# Patient Record
Sex: FEMALE | Race: WHITE | Employment: OTHER | URBAN - NONMETROPOLITAN AREA
[De-identification: names, ages, dates, MRNs, and addresses within clinical notes are randomized per-mention and may not be internally consistent; named-entity substitution may affect disease eponyms.]

---

## 2018-11-07 ENCOUNTER — OFFICE VISIT (OUTPATIENT)
Dept: SURGERY | Age: 63
End: 2018-11-07
Payer: COMMERCIAL

## 2018-11-07 VITALS
SYSTOLIC BLOOD PRESSURE: 138 MMHG | BODY MASS INDEX: 27.38 KG/M2 | DIASTOLIC BLOOD PRESSURE: 70 MMHG | HEART RATE: 72 BPM | WEIGHT: 145 LBS | HEIGHT: 61 IN

## 2018-11-07 DIAGNOSIS — N63.20 LEFT BREAST MASS: Primary | ICD-10-CM

## 2018-11-07 DIAGNOSIS — R92.8 ABNORMAL MAMMOGRAM: ICD-10-CM

## 2018-11-07 DIAGNOSIS — N60.19 FIBROCYSTIC BREAST DISEASE (FCBD), UNSPECIFIED LATERALITY: ICD-10-CM

## 2018-11-07 DIAGNOSIS — N63.0 LUMP OR MASS IN BREAST: ICD-10-CM

## 2018-11-07 PROCEDURE — 99204 OFFICE O/P NEW MOD 45 MIN: CPT | Performed by: SURGERY

## 2018-12-06 PROBLEM — R92.8 ABNORMAL MAMMOGRAM: Status: ACTIVE | Noted: 2018-12-06

## 2018-12-06 PROBLEM — N63.0 LUMP OR MASS IN BREAST: Status: ACTIVE | Noted: 2018-12-06

## 2018-12-06 PROBLEM — N60.19 DIFFUSE CYSTIC MASTOPATHY: Status: ACTIVE | Noted: 2018-12-06

## 2019-02-13 ENCOUNTER — OFFICE VISIT (OUTPATIENT)
Dept: SURGERY | Age: 64
End: 2019-02-13
Payer: COMMERCIAL

## 2019-02-13 ENCOUNTER — HOSPITAL ENCOUNTER (OUTPATIENT)
Dept: WOMENS IMAGING | Age: 64
Discharge: HOME | End: 2019-02-13
Payer: COMMERCIAL

## 2019-02-13 DIAGNOSIS — N63.20 LEFT BREAST MASS: ICD-10-CM

## 2019-02-13 DIAGNOSIS — N60.19 FIBROCYSTIC BREAST DISEASE (FCBD), UNSPECIFIED LATERALITY: ICD-10-CM

## 2019-02-13 DIAGNOSIS — R92.8 ABNORMAL MAMMOGRAM: ICD-10-CM

## 2019-02-13 DIAGNOSIS — N63.0 LUMP OR MASS IN BREAST: Primary | ICD-10-CM

## 2019-02-13 PROCEDURE — 76642 ULTRASOUND BREAST LIMITED: CPT

## 2019-02-13 PROCEDURE — 99213 OFFICE O/P EST LOW 20 MIN: CPT | Performed by: SURGERY

## 2019-03-29 ENCOUNTER — TELEPHONE (OUTPATIENT)
Dept: SURGERY | Age: 64
End: 2019-03-29

## 2021-08-25 ENCOUNTER — OFFICE VISIT (OUTPATIENT)
Dept: OBGYN CLINIC | Age: 66
End: 2021-08-25
Payer: COMMERCIAL

## 2021-08-25 VITALS
BODY MASS INDEX: 26.45 KG/M2 | WEIGHT: 140 LBS | SYSTOLIC BLOOD PRESSURE: 140 MMHG | DIASTOLIC BLOOD PRESSURE: 60 MMHG | HEART RATE: 81 BPM

## 2021-08-25 DIAGNOSIS — Z12.4 SCREENING FOR CERVICAL CANCER: ICD-10-CM

## 2021-08-25 DIAGNOSIS — Z01.419 WOMEN'S ANNUAL ROUTINE GYNECOLOGICAL EXAMINATION: Primary | ICD-10-CM

## 2021-08-25 DIAGNOSIS — Z76.89 ENCOUNTER TO ESTABLISH CARE WITH NEW DOCTOR: ICD-10-CM

## 2021-08-25 DIAGNOSIS — N95.2 VAGINAL ATROPHY: ICD-10-CM

## 2021-08-25 DIAGNOSIS — Z12.31 ENCOUNTER FOR SCREENING MAMMOGRAM FOR MALIGNANT NEOPLASM OF BREAST: ICD-10-CM

## 2021-08-25 PROCEDURE — 99386 PREV VISIT NEW AGE 40-64: CPT | Performed by: NURSE PRACTITIONER

## 2021-08-25 RX ORDER — ESTRADIOL 0.1 MG/G
1 CREAM VAGINAL SEE ADMIN INSTRUCTIONS
Qty: 1 TUBE | Refills: 3 | Status: SHIPPED | OUTPATIENT
Start: 2021-08-25

## 2021-08-25 NOTE — PATIENT INSTRUCTIONS
Patient Education        A Healthy Lifestyle: Care Instructions  Your Care Instructions     A healthy lifestyle can help you feel good, stay at a healthy weight, and have plenty of energy for both work and play. A healthy lifestyle is something you can share with your whole family. A healthy lifestyle also can lower your risk for serious health problems, such as high blood pressure, heart disease, and diabetes. You can follow a few steps listed below to improve your health and the health of your family. Follow-up care is a key part of your treatment and safety. Be sure to make and go to all appointments, and call your doctor if you are having problems. It's also a good idea to know your test results and keep a list of the medicines you take. How can you care for yourself at home? · Do not eat too much sugar, fat, or fast foods. You can still have dessert and treats now and then. The goal is moderation. · Start small to improve your eating habits. Pay attention to portion sizes, drink less juice and soda pop, and eat more fruits and vegetables. ? Eat a healthy amount of food. A 3-ounce serving of meat, for example, is about the size of a deck of cards. Fill the rest of your plate with vegetables and whole grains. ? Limit the amount of soda and sports drinks you have every day. Drink more water when you are thirsty. ? Eat plenty of fruits and vegetables every day. Have an apple or some carrot sticks as an afternoon snack instead of a candy bar. Try to have fruits and/or vegetables at every meal.  · Make exercise part of your daily routine. You may want to start with simple activities, such as walking, bicycling, or slow swimming. Try to be active 30 to 60 minutes every day. You do not need to do all 30 to 60 minutes all at once. For example, you can exercise 3 times a day for 10 or 20 minutes.  Moderate exercise is safe for most people, but it is always a good idea to talk to your doctor before starting an 2020               Content Version: 12.9  © 2006-2021 Healthwise, Incorporated. Care instructions adapted under license by [facility] ([facility]). If you have questions about a medical condition or this instruction, always ask your healthcare professional. [address] any warranty or liability for your use of this information. Patient Education        Breast Self-Exam: Care Instructions  Your Care Instructions     A breast self-exam is when you check your breasts for lumps or changes. This regular exam helps you learn how your breasts normally look and feel. Most breast problems or changes are not because of cancer. Breast self-exam is not a substitute for a mammogram. Having regular breast exams by your doctor and regular mammograms improve your chances of finding any problems with your breasts. Some women set a time each month to do a step-by-step breast self-exam. Other women like a less formal system. They might look at their breasts as they brush their teeth, or feel their breasts once in a while in the shower. If you notice a change in your breast, tell your doctor. Follow-up care is a key part of your treatment and safety. Be sure to make and go to all appointments, and call your doctor if you are having problems. It's also a good idea to know your test results and keep a list of the medicines you take. How do you do a breast self-exam?  · The best time to examine your breasts is usually one week after your menstrual period begins. Your breasts should not be tender then. If you do not have periods, you might do your exam on a day of the month that is easy to remember. · To examine your breasts:  ? Remove all your clothes above the waist and lie down. When you are lying down, your breast tissue spreads evenly over your chest wall, which makes it easier to feel all your breast tissue. ?  Use the padsnot the fingertipsof the 3 middle fingers of your left hand to check your right breast. Move your fingers slowly in small coin-sized circles that overlap. ? Use three levels of pressure to feel of all your breast tissue. Use light pressure to feel the tissue close to the skin surface. Use medium pressure to feel a little deeper. Use firm pressure to feel your tissue close to your breastbone and ribs. Use each pressure level to feel your breast tissue before moving on to the next spot. ? Check your entire breast, moving up and down as if following a strip from the collarbone to the bra line, and from the armpit to the ribs. Repeat until you have covered the entire breast.  ? Repeat this procedure for your left breast, using the pads of the 3 middle fingers of your right hand. · To examine your breasts while in the shower:  ? Place one arm over your head and lightly soap your breast on that side. ? Using the pads of your fingers, gently move your hand over your breast (in the strip pattern described above), feeling carefully for any lumps or changes. ? Repeat for the other breast.  · Have your doctor inspect anything you notice to see if you need further testing. Where can you learn more? Go to https://rapt.fmpeSnapetteeb.[employer]. org and sign in to your [employer] account. Enter P148 in the [facility] box to learn more about \"Breast Self-Exam: Care Instructions. \"     If you do not have an account, please click on the \"Sign Up Now\" link. Current as of: December 17, 2020               Content Version: 12.9  © 2006-2021 Healthwise, [facility]. Care instructions adapted under license by [facility] ([facility]). If you have questions about a medical condition or this instruction, always ask your healthcare professional. [place] [address] any warranty or liability for your use of this information.

## 2021-08-25 NOTE — PROGRESS NOTES
dyspareunia and vaginal pain. Negative for dysuria, frequency, menstrual problem, urgency, vaginal bleeding and vaginal discharge. Musculoskeletal: Negative. Skin: Negative. Allergic/Immunologic: Negative. Neurological: Negative. Hematological: Negative. Psychiatric/Behavioral: Negative. Physical Exam  Vitals and nursing note reviewed. Constitutional:       Appearance: She is well-developed. HENT:      Head: Normocephalic and atraumatic. Eyes:      Conjunctiva/sclera: Conjunctivae normal.      Pupils: Pupils are equal, round, and reactive to light. Neck:      Thyroid: No thyromegaly. Cardiovascular:      Rate and Rhythm: Normal rate and regular rhythm. Heart sounds: Normal heart sounds. Pulmonary:      Effort: Pulmonary effort is normal. No respiratory distress. Breath sounds: Normal breath sounds. Chest:      Comments: Breasts symmetrical without tenderness, masses, or nipple discharge. Nipples everted bilaterally. Abdominal:      Palpations: Abdomen is soft. Tenderness: There is no abdominal tenderness. Genitourinary:     General: Normal vulva. Vagina: Normal.      Cervix: Normal.      Uterus: Normal.       Adnexa: Right adnexa normal and left adnexa normal.        Right: No mass, tenderness or fullness. Left: No mass, tenderness or fullness. Comments: Labia & Vagina: Pale, atrophic characteristics noted  Musculoskeletal:      Cervical back: Normal range of motion and neck supple. Skin:     General: Skin is warm and dry. Neurological:      Mental Status: She is alert and oriented to person, place, and time. Psychiatric:         Thought Content: Thought content normal.          Diagnosis Orders   1. Women's annual routine gynecological examination  PAP SMEAR    Human papillomavirus (HPV) DNA probe thin prep high risk   2.  Encounter for screening mammogram for malignant neoplasm of breast  TIN DIGITAL SCREEN W OR WO CAD BILATERAL 3. Screening for cervical cancer  PAP SMEAR    Human papillomavirus (HPV) DNA probe thin prep high risk   4. Encounter to establish care with new doctor     5. Vaginal atrophy         MEDICATIONS:  Orders Placed This Encounter   Medications    estradiol (ESTRACE VAGINAL) 0.1 MG/GM vaginal cream     Sig: Place 1 g vaginally See Admin Instructions 1g vaginally at bedtime x14 days and then 3x week. Dispense:  1 Tube     Refill:  3       ORDERS:  Orders Placed This Encounter   Procedures    TIN DIGITAL SCREEN W OR WO CAD BILATERAL    PAP SMEAR    Human papillomavirus (HPV) DNA probe thin prep high risk       PLAN:  1. WWE- pap collected and SBE reviewed and CBE performed. Mammogram ordered. Annual labs with PCP. 2. Postmenopausal Vaginal atrophy- starting estrace vaginal cream and f/u in 3months.

## 2021-08-28 LAB
HPV COMMENT: NORMAL
HPV TYPE 16: NOT DETECTED
HPV TYPE 18: NOT DETECTED
HPVOH (OTHER TYPES): NOT DETECTED

## 2021-09-01 ENCOUNTER — HOSPITAL ENCOUNTER (OUTPATIENT)
Dept: WOMENS IMAGING | Age: 66
Discharge: HOME | End: 2021-09-01
Payer: COMMERCIAL

## 2021-09-01 DIAGNOSIS — Z12.31 ENCOUNTER FOR SCREENING MAMMOGRAM FOR MALIGNANT NEOPLASM OF BREAST: ICD-10-CM

## 2021-09-01 PROCEDURE — 77063 BREAST TOMOSYNTHESIS BI: CPT

## 2021-09-08 ASSESSMENT — ENCOUNTER SYMPTOMS
RESPIRATORY NEGATIVE: 1
EYES NEGATIVE: 1
ALLERGIC/IMMUNOLOGIC NEGATIVE: 1
GASTROINTESTINAL NEGATIVE: 1

## 2021-09-27 ENCOUNTER — LAB (OUTPATIENT)
Dept: LAB | Facility: HOSPITAL | Age: 66
End: 2021-09-27

## 2021-09-27 ENCOUNTER — CONSULT (OUTPATIENT)
Dept: ONCOLOGY | Facility: CLINIC | Age: 66
End: 2021-09-27

## 2021-09-27 ENCOUNTER — TELEPHONE (OUTPATIENT)
Dept: ONCOLOGY | Facility: CLINIC | Age: 66
End: 2021-09-27

## 2021-09-27 VITALS
OXYGEN SATURATION: 94 % | TEMPERATURE: 97.5 F | BODY MASS INDEX: 27.8 KG/M2 | SYSTOLIC BLOOD PRESSURE: 122 MMHG | HEIGHT: 60 IN | HEART RATE: 97 BPM | WEIGHT: 141.6 LBS | RESPIRATION RATE: 16 BRPM | DIASTOLIC BLOOD PRESSURE: 68 MMHG

## 2021-09-27 DIAGNOSIS — D61.818 PANCYTOPENIA (HCC): Primary | ICD-10-CM

## 2021-09-27 LAB
ALBUMIN SERPL-MCNC: 4.2 G/DL (ref 3.5–5.2)
ALBUMIN/GLOB SERPL: 1.4 G/DL
ALP SERPL-CCNC: 70 U/L (ref 39–117)
ALT SERPL W P-5'-P-CCNC: 18 U/L (ref 1–33)
ANION GAP SERPL CALCULATED.3IONS-SCNC: 8 MMOL/L (ref 5–15)
ANISOCYTOSIS BLD QL: NORMAL
AST SERPL-CCNC: 22 U/L (ref 1–32)
BILIRUB SERPL-MCNC: 0.7 MG/DL (ref 0–1.2)
BUN SERPL-MCNC: 10 MG/DL (ref 8–23)
BUN/CREAT SERPL: 22.7 (ref 7–25)
CALCIUM SPEC-SCNC: 9 MG/DL (ref 8.6–10.5)
CHLORIDE SERPL-SCNC: 106 MMOL/L (ref 98–107)
CO2 SERPL-SCNC: 27 MMOL/L (ref 22–29)
CREAT SERPL-MCNC: 0.44 MG/DL (ref 0.57–1)
CYTOLOGIST CVX/VAG CYTO: NORMAL
DEPRECATED RDW RBC AUTO: 74.4 FL (ref 37–54)
ERYTHROCYTE [DISTWIDTH] IN BLOOD BY AUTOMATED COUNT: 20.7 % (ref 12.3–15.4)
FERRITIN SERPL-MCNC: 472.4 NG/ML (ref 13–150)
FOLATE SERPL-MCNC: >20 NG/ML (ref 4.78–24.2)
GFR SERPL CREATININE-BSD FRML MDRD: 144 ML/MIN/1.73
GLOBULIN UR ELPH-MCNC: 3 GM/DL
GLUCOSE SERPL-MCNC: 107 MG/DL (ref 65–99)
HCT VFR BLD AUTO: 27.4 % (ref 34–46.6)
HGB BLD-MCNC: 9.3 G/DL (ref 12–15.9)
IRON 24H UR-MRATE: 141 MCG/DL (ref 37–145)
IRON SATN MFR SERPL: 50 % (ref 20–50)
LDH SERPL-CCNC: 280 U/L (ref 135–214)
LYMPHOCYTES # BLD MANUAL: 1.78 10*3/MM3 (ref 0.7–3.1)
LYMPHOCYTES NFR BLD MANUAL: 34 % (ref 19.6–45.3)
LYMPHOCYTES NFR BLD MANUAL: 7 % (ref 5–12)
MACROCYTES BLD QL SMEAR: NORMAL
MCH RBC QN AUTO: 34.3 PG (ref 26.6–33)
MCHC RBC AUTO-ENTMCNC: 33.9 G/DL (ref 31.5–35.7)
MCV RBC AUTO: 101.1 FL (ref 79–97)
MONOCYTES # BLD AUTO: 0.37 10*3/MM3 (ref 0.1–0.9)
NEUTROPHILS # BLD AUTO: 3.09 10*3/MM3 (ref 1.7–7)
NEUTROPHILS NFR BLD MANUAL: 56 % (ref 42.7–76)
NEUTS BAND NFR BLD MANUAL: 3 % (ref 0–5)
PATH INTERP BLD-IMP: NORMAL
PLATELET # BLD AUTO: 18 10*3/MM3 (ref 140–450)
PMV BLD AUTO: 10.7 FL (ref 6–12)
POLYCHROMASIA BLD QL SMEAR: NORMAL
POTASSIUM SERPL-SCNC: 3.3 MMOL/L (ref 3.5–5.2)
PROT SERPL-MCNC: 7.2 G/DL (ref 6–8.5)
RBC # BLD AUTO: 2.71 10*6/MM3 (ref 3.77–5.28)
SMALL PLATELETS BLD QL SMEAR: NORMAL
SODIUM SERPL-SCNC: 141 MMOL/L (ref 136–145)
TIBC SERPL-MCNC: 280 MCG/DL (ref 298–536)
TRANSFERRIN SERPL-MCNC: 188 MG/DL (ref 200–360)
VIT B12 BLD-MCNC: 534 PG/ML (ref 211–946)
WBC # BLD AUTO: 5.24 10*3/MM3 (ref 3.4–10.8)
WBC MORPH BLD: NORMAL

## 2021-09-27 PROCEDURE — 80053 COMPREHEN METABOLIC PANEL: CPT | Performed by: INTERNAL MEDICINE

## 2021-09-27 PROCEDURE — 88184 FLOWCYTOMETRY/ TC 1 MARKER: CPT

## 2021-09-27 PROCEDURE — 82728 ASSAY OF FERRITIN: CPT | Performed by: INTERNAL MEDICINE

## 2021-09-27 PROCEDURE — 82746 ASSAY OF FOLIC ACID SERUM: CPT | Performed by: INTERNAL MEDICINE

## 2021-09-27 PROCEDURE — 84466 ASSAY OF TRANSFERRIN: CPT | Performed by: INTERNAL MEDICINE

## 2021-09-27 PROCEDURE — 85060 BLOOD SMEAR INTERPRETATION: CPT | Performed by: INTERNAL MEDICINE

## 2021-09-27 PROCEDURE — 88185 FLOWCYTOMETRY/TC ADD-ON: CPT | Performed by: INTERNAL MEDICINE

## 2021-09-27 PROCEDURE — 85007 BL SMEAR W/DIFF WBC COUNT: CPT | Performed by: INTERNAL MEDICINE

## 2021-09-27 PROCEDURE — 83540 ASSAY OF IRON: CPT | Performed by: INTERNAL MEDICINE

## 2021-09-27 PROCEDURE — 99204 OFFICE O/P NEW MOD 45 MIN: CPT | Performed by: INTERNAL MEDICINE

## 2021-09-27 PROCEDURE — 36415 COLL VENOUS BLD VENIPUNCTURE: CPT | Performed by: INTERNAL MEDICINE

## 2021-09-27 PROCEDURE — 83615 LACTATE (LD) (LDH) ENZYME: CPT | Performed by: INTERNAL MEDICINE

## 2021-09-27 PROCEDURE — 82607 VITAMIN B-12: CPT | Performed by: INTERNAL MEDICINE

## 2021-09-27 PROCEDURE — 85025 COMPLETE CBC W/AUTO DIFF WBC: CPT | Performed by: INTERNAL MEDICINE

## 2021-09-27 RX ORDER — ZINC SULFATE 50(220)MG
220 CAPSULE ORAL DAILY
COMMUNITY
End: 2021-10-27

## 2021-09-27 RX ORDER — ATORVASTATIN CALCIUM 80 MG/1
80 TABLET, FILM COATED ORAL
COMMUNITY
Start: 2021-09-25 | End: 2021-11-05

## 2021-09-27 RX ORDER — METOPROLOL SUCCINATE 25 MG/1
TABLET, EXTENDED RELEASE ORAL
COMMUNITY
Start: 2021-09-25 | End: 2021-10-27

## 2021-09-27 RX ORDER — ESTRADIOL 0.1 MG/G
1 CREAM VAGINAL
COMMUNITY
Start: 2021-08-25 | End: 2021-10-27

## 2021-09-27 RX ORDER — MULTIPLE VITAMINS W/ MINERALS TAB 9MG-400MCG
1 TAB ORAL DAILY
COMMUNITY
End: 2021-10-27

## 2021-09-27 RX ORDER — MULTIVIT WITH MINERALS/LUTEIN
250 TABLET ORAL DAILY
COMMUNITY
End: 2021-10-27

## 2021-09-27 RX ORDER — ISOSORBIDE MONONITRATE 30 MG/1
30 TABLET, EXTENDED RELEASE ORAL DAILY
COMMUNITY
Start: 2021-09-25 | End: 2021-10-27

## 2021-09-27 RX ORDER — PHENOL 1.4 %
600 AEROSOL, SPRAY (ML) MUCOUS MEMBRANE DAILY
COMMUNITY
End: 2021-10-27

## 2021-09-27 NOTE — PROGRESS NOTES
[facility] HEMATOLOGY AND ONCOLOGY  [address]  [place] [address]  [phone]       09/27/2021     CONSULT NOTE     PATIENT NAME: Ratna Cohen  YOB: 1955  MR: [id number]    REFERRING PROVIDER: Sanjana Amaya APRN  PCP: Faith Alcaraz APRN    REASON FOR REFERRAL: Pancytopenia    HPI:   Ms. Ratna Cohen is a very pleasant 66 y.o. white female who reports that since last year has been having progressively worsening dyspnea and fatigue.  For the last 3 weeks though symptoms worsened.  She was admitted to [facility] on 9/21/2021 and discharged on 9/25/2021.  During that admission, she received 2 UPRBCs and PLT transfusions x 2. She felt better after the PRBCs. She also had a stress test because of the dyspnea which was positive but cardiac catheterization could not be performed because of thrombocytopenia. The patient reports that on the day of the discharge she was told that she tested positive for parvovirus antibodies. I told the patient I would request those results because it is not currently available in the EMR. The patient reports that since 6/2021 she has been noticing petechiae on both ankles.    Later the parvovirus antibody test done last week came by fax, it was positive for IgG and negative for IgM antibodies so I told the patient that those results are not clinically relevant for her.    Today she is feeling well and is minimally symptomatic. She says she can ambulate several feet without any symptoms or need for assistance. She came accompanied by the . They are both good historians. It became clear that her symptoms started last year when she noticing declining stamina and prior to that she was very active without any significant medical diagnosis.    Today the patient agreed to have a CBC and additional laboratory testing done.  The hemoglobin today was 9.3 and platelets 18  therefore we decided to continue observation, repeat the CBC next week and make a referral to [facility] for suspected diagnosis of aplastic anemia.    PAST HISTORY:     HEME/ONC HISTORY  Oncology/Hematology History Overview Note     HEME/ONC DX/RX/INVESTIGATIONS SUMMARY:   DX: Pancytopenia (neutropenia, anemia, thrombocytopenia). Anemic symptoms started in 2020, petechiae started in 6/2021.    RX: 2U PRBCs and 2 platelet transfusions on 9/21/2021.    OTHER INFO:   No prior hematological history or significant medical history until 9/21/2021.    Stress test was positive during admission at [facility] in 9/21/2021, cardiac cath not possible because of thrombocytopenia.  Patient never had chest pain, only dyspnea and weakness.    INVESTIGATIONS:   9/21/2021-9:48 AM, WBC 3.4 hemoglobin 7.7 MCV and 15.8 platelets 12 neutrophils 1.1 lymphocytes 1.8.  9/21/2021-17:20 PM, WBC 3.9 hemoglobin 8.3 .1 platelets 23.  9/22/2021, folic acid 25.9 ferritin 365 B12 368 HBsAg negative, HIV negative, hep C antibody negative reticulocyte 4.0%, PT 10.7, INR 1.0 PTT 25.3. Fecal occult blood negative.  9/22/2021, 1:37 AM, WBC 3.6 hemoglobin 7.0 .5 platelets 11 neutrophils 1.06 lymphocytes 2.19.  9/23/2021, WBC 4.0 hemoglobin 10.4 .1 platelets 12.  Neutrophils 1.38 lymphocytes 2.0. BUN 11 creatinine 0.47 calcium 8.5 bilirubin 0.7 ALT 31 AST 26 alk phos 68 albumin 3.7 total protein 7.1.       MEDICAL HISTORY   has a past medical history of Acid reflux and Anemia.   HEALTH MAINTENANCE ITEMS  Health Maintenance Due   Topic Date Due   • COLORECTAL CANCER SCREENING  Never done   • ANNUAL PHYSICAL  Never done   • TDAP/TD VACCINES (1 - Tdap) Never done   • ZOSTER VACCINE (1 of 2) Never done   • HEPATITIS C SCREENING  Never done     <no information>  Last Completed Colonoscopy     This patient has no relevant Health Maintenance data.          There is no immunization history on file for this  "patient.  Last Completed Mammogram          MAMMOGRAM (Every 2 Years) Next due on 9/1/2023 09/01/2021  Outside Procedure: HC MAMMOGRAM SCREENING BILAT DIGITAL W CAD              FAMILY HISTORY  Family History   Problem Relation Age of Onset   • Diabetes Mother    • Hypertension Mother    • Heart attack Mother    • Hypertension Father    • Heart attack Father        Cancer-related family history is not on file.   SURGICAL HISTORY   has a past surgical history that includes Appendectomy.  SOCIAL HISTORY  Social History     Socioeconomic History   • Marital status:      Spouse name: Not on file   • Number of children: Not on file   • Years of education: Not on file   • Highest education level: Not on file   Tobacco Use   • Smoking status: Never Smoker   Vaping Use   • Vaping Use: Never used   Substance and Sexual Activity   • Alcohol use: Not Currently   • Sexual activity: Yes     MEDICATIONS:     Current Outpatient Medications   Medication Instructions   • atorvastatin (LIPITOR) 80 mg, Oral, Every Night at Bedtime   • calcium carbonate (OS-HOLDEN) 600 mg, Oral, Daily   • estradiol (ESTRACE) 1 g, Vaginal   • isosorbide mononitrate (IMDUR) 30 mg, Oral, Daily   • metoprolol succinate XL (TOPROL-XL) 25 MG 24 hr tablet TAKE 1 TABLET BY MOUTH ONCE DAILY AT 8 AM   • multivitamin with minerals tablet tablet 1 tablet, Oral, Daily   • psyllium (KONSYL) 28.3 % pack pack Oral, Daily   • vitamin C (ASCORBIC ACID) 250 mg, Oral, Daily   • vitamin D3 5,000 Units, Oral, Daily   • zinc sulfate (ZINCATE) 220 mg, Oral, Daily      ALLERGIES:   No Known Allergies  ROS:   Pertinent positive and negative findings as noted in HPI.   PHYSICAL EXAM:   /68   Pulse 97   Temp 97.5 °F (36.4 °C)   Resp 16   Ht 152.4 cm (60\")   Wt 64.2 kg (141 lb 9.6 oz)   SpO2 94%   BMI 27.65 kg/m²  Body surface area is 1.61 meters squared.   Pain Score    09/27/21 0838   PainSc: 0-No pain     Alert, oriented x3, cooperative, not in distress, " healthy appearance.  HEENT: Normocephalic, wearing a facemask.  Neck: No lymphadenopathy.  Lungs: No tachypnea. Clear bilaterally.  Heart: Regular rate and rhythm.  Abdomen: Soft, nontender, no palpable organomegaly.  Extremities: No ankle edema.  Neurologic: Moves all extremities.  Skin: Mild bilateral petechiae on both ankles.    INVESTIGATIONS/LABS:     Lab Results - Last 18 Months   Lab Units 09/27/21  0942   WBC 10*3/mm3 5.24   HEMOGLOBIN g/dL 9.3*   HEMATOCRIT % 27.4*   MCV fL 101.1*   PLATELETS 10*3/mm3 18*   NEUTROS ABS 10*3/mm3 3.09   NEUTROPHIL % % 56.0   MONOCYTES % % 7.0   ANISOCYTOSIS  Slight/1+       Lab Results - Last 18 Months   Lab Units 09/27/21  0942   BUN mg/dL 10   CREATININE mg/dL 0.44*   GLUCOSE mg/dL 107*   SODIUM mmol/L 141   POTASSIUM mmol/L 3.3*   CO2 mmol/L 27.0   CHLORIDE mmol/L 106   ANION GAP mmol/L 8.0   BUN / CREAT RATIO  22.7   CALCIUM mg/dL 9.0   EGFR IF NONAFRICN AM mL/min/1.73 144   ALK PHOS U/L 70   TOTAL PROTEIN g/dL 7.2   ALT (SGPT) U/L 18   AST (SGOT) U/L 22   BILIRUBIN mg/dL 0.7   ALBUMIN g/dL 4.20   GLOBULIN gm/dL 3.0       Lab Results - Last 18 Months   Lab Units 09/27/21  0942   LDH U/L 280*       Lab Results - Last 18 Months   Lab Units 09/27/21  0942   IRON mcg/dL 141   TIBC mcg/dL 280*   IRON SATURATION % 50   FERRITIN ng/mL 472.40*       No radiology results for the last 90 days.    ASSESSMENT:   Pancytopenia undergoing evaluation. The patient was admitted to [facility] last week because of symptomatic anemia. She received PRBCs and platelets and is feeling better today. The patient reports that did not have any CBCs for several years so the latest was from last week. Her symptoms started last year therefore she has chronic or subacute pancytopenia. Differential diagnoses include aplastic anemia, MDS, leukemia.     PLAN/RECOMMENDATIONS:   Labs now, VST today after labs.    Referral to [facility]: RE: Aplastic anemia    VST in one week,  labs 1h PTV.    DX/ORDERS:   Diagnoses and all orders for this visit:    1. Pancytopenia (HCC) (Primary)  -     CBC Auto Differential  -     Lactate Dehydrogenase  -     Comprehensive Metabolic Panel  -     Ferritin  -     Iron Profile  -     Vitamin B12 & Folate  -     Peripheral Blood Smear  -     Leukemia / Lymphoma Immunophenotyping Profile  -     Cancel: Parvovirus B19 Quant PCR  -     Manual Differential  -     CBC Auto Differential; Future  -     Type & Screen; Future  -     Ambulatory Referral to Hematology        Future Appointments   Date Time Provider Department Center   10/4/2021  9:30 AM [facility] CANCER CTR LAB [facility] CCLAB [facility]   10/4/2021 10:30 AM Devante Galvez MD [facility]      Thank you very much for having referred this very pleasant patient.    Devante Galvez MD  9/27/2021    cc: Sanjana Amaya APRN, Faith Alcaraz APRN

## 2021-09-27 NOTE — TELEPHONE ENCOUNTER
CRITICAL LAB VALUe:  Received call from Angie  Hematology dept with CRITICAL LAB VALUE OF:    PLT: 18  This information was given to Becki COTTON for relay to Dr Galvez

## 2021-09-29 LAB
ANNOTATION COMMENT IMP: NORMAL
ASSESSMENT OF LEUKOCYTES: NORMAL
CLINICAL INFO: NORMAL
GATING STRATEGY: NORMAL
IMMUNOPHENOTYPING STUDY: NORMAL
LABORATORY COMMENT REPORT: NORMAL
PATH INTERP SPEC-IMP: NORMAL
PATHOLOGIST NAME: NORMAL
SPECIMEN SOURCE: NORMAL
VIABLE CELLS NFR SPEC: NORMAL %

## 2021-10-01 ENCOUNTER — PATIENT ROUNDING (BHMG ONLY) (OUTPATIENT)
Dept: ONCOLOGY | Facility: CLINIC | Age: 66
End: 2021-10-01

## 2021-10-01 NOTE — PROGRESS NOTES
October 1, 2021    Hello, may I speak with Ratna Cohen?    My name is Sol Aguillon.     I am  with [facility] HEMATOLOGY AND ONCOLOGY  [address]  [place] [address] [phone].    Before we get started may I verify your date of birth? 1955    I am calling to officially welcome you to our practice and ask about your recent visit. Is this a good time to talk? No, on my way to [place] right now.    Tell me about your visit with us. What things went well?  Yes       We're always looking for ways to make our patients' experiences even better. Do you have recommendations on ways we may improve?  Not right now.    Overall were you satisfied with your first visit to our practice? Yes       I appreciate you taking the time to speak with me today. Is there anything else I can do for you? No      Thank you, and have a great day.

## 2021-10-04 ENCOUNTER — APPOINTMENT (OUTPATIENT)
Dept: LAB | Facility: HOSPITAL | Age: 66
End: 2021-10-04

## 2021-10-11 ENCOUNTER — APPOINTMENT (OUTPATIENT)
Dept: LAB | Facility: HOSPITAL | Age: 66
End: 2021-10-11

## 2021-10-15 ENCOUNTER — TELEPHONE (OUTPATIENT)
Dept: ONCOLOGY | Facility: CLINIC | Age: 66
End: 2021-10-15

## 2021-10-15 ENCOUNTER — LAB (OUTPATIENT)
Dept: LAB | Facility: HOSPITAL | Age: 66
End: 2021-10-15

## 2021-10-15 ENCOUNTER — INFUSION (OUTPATIENT)
Dept: ONCOLOGY | Facility: HOSPITAL | Age: 66
End: 2021-10-15

## 2021-10-15 ENCOUNTER — OFFICE VISIT (OUTPATIENT)
Dept: ONCOLOGY | Facility: CLINIC | Age: 66
End: 2021-10-15

## 2021-10-15 VITALS
BODY MASS INDEX: 25.95 KG/M2 | DIASTOLIC BLOOD PRESSURE: 57 MMHG | RESPIRATION RATE: 16 BRPM | TEMPERATURE: 97.1 F | SYSTOLIC BLOOD PRESSURE: 127 MMHG | HEART RATE: 94 BPM | OXYGEN SATURATION: 100 % | HEIGHT: 62 IN | WEIGHT: 141 LBS

## 2021-10-15 VITALS
TEMPERATURE: 97.1 F | HEART RATE: 102 BPM | WEIGHT: 141.8 LBS | RESPIRATION RATE: 16 BRPM | SYSTOLIC BLOOD PRESSURE: 128 MMHG | HEIGHT: 60 IN | OXYGEN SATURATION: 99 % | DIASTOLIC BLOOD PRESSURE: 60 MMHG | BODY MASS INDEX: 27.84 KG/M2

## 2021-10-15 DIAGNOSIS — D61.818 PANCYTOPENIA (HCC): Primary | ICD-10-CM

## 2021-10-15 DIAGNOSIS — D61.818 PANCYTOPENIA (HCC): ICD-10-CM

## 2021-10-15 LAB
ABO GROUP BLD: NORMAL
BASOPHILS # BLD AUTO: 0.01 10*3/MM3 (ref 0–0.2)
BASOPHILS NFR BLD AUTO: 0.3 % (ref 0–1.5)
BLD GP AB SCN SERPL QL: NEGATIVE
DEPRECATED RDW RBC AUTO: 77.1 FL (ref 37–54)
EOSINOPHIL # BLD AUTO: 0.03 10*3/MM3 (ref 0–0.4)
EOSINOPHIL NFR BLD AUTO: 0.8 % (ref 0.3–6.2)
ERYTHROCYTE [DISTWIDTH] IN BLOOD BY AUTOMATED COUNT: 21.2 % (ref 12.3–15.4)
HCT VFR BLD AUTO: 22.4 % (ref 34–46.6)
HGB BLD-MCNC: 7.6 G/DL (ref 12–15.9)
HOLD SPECIMEN: NORMAL
IMM GRANULOCYTES # BLD AUTO: 0.01 10*3/MM3 (ref 0–0.05)
IMM GRANULOCYTES NFR BLD AUTO: 0.3 % (ref 0–0.5)
LYMPHOCYTES # BLD AUTO: 1.96 10*3/MM3 (ref 0.7–3.1)
LYMPHOCYTES NFR BLD AUTO: 52.8 % (ref 19.6–45.3)
MCH RBC QN AUTO: 34.7 PG (ref 26.6–33)
MCHC RBC AUTO-ENTMCNC: 33.9 G/DL (ref 31.5–35.7)
MCV RBC AUTO: 102.3 FL (ref 79–97)
MONOCYTES # BLD AUTO: 0.37 10*3/MM3 (ref 0.1–0.9)
MONOCYTES NFR BLD AUTO: 10 % (ref 5–12)
NEUTROPHILS NFR BLD AUTO: 1.33 10*3/MM3 (ref 1.7–7)
NEUTROPHILS NFR BLD AUTO: 35.8 % (ref 42.7–76)
NRBC BLD AUTO-RTO: 0.5 /100 WBC (ref 0–0.2)
PLATELET # BLD AUTO: 11 10*3/MM3 (ref 140–450)
PMV BLD AUTO: 11.5 FL (ref 6–12)
RBC # BLD AUTO: 2.19 10*6/MM3 (ref 3.77–5.28)
RH BLD: NEGATIVE
T&S EXPIRATION DATE: NORMAL
WBC # BLD AUTO: 3.71 10*3/MM3 (ref 3.4–10.8)

## 2021-10-15 PROCEDURE — 86850 RBC ANTIBODY SCREEN: CPT | Performed by: INTERNAL MEDICINE

## 2021-10-15 PROCEDURE — 36430 TRANSFUSION BLD/BLD COMPNT: CPT

## 2021-10-15 PROCEDURE — 86900 BLOOD TYPING SEROLOGIC ABO: CPT

## 2021-10-15 PROCEDURE — 85025 COMPLETE CBC W/AUTO DIFF WBC: CPT | Performed by: INTERNAL MEDICINE

## 2021-10-15 PROCEDURE — P9016 RBC LEUKOCYTES REDUCED: HCPCS

## 2021-10-15 PROCEDURE — 36415 COLL VENOUS BLD VENIPUNCTURE: CPT | Performed by: INTERNAL MEDICINE

## 2021-10-15 PROCEDURE — 86901 BLOOD TYPING SEROLOGIC RH(D): CPT | Performed by: INTERNAL MEDICINE

## 2021-10-15 PROCEDURE — 99214 OFFICE O/P EST MOD 30 MIN: CPT | Performed by: INTERNAL MEDICINE

## 2021-10-15 PROCEDURE — 86900 BLOOD TYPING SEROLOGIC ABO: CPT | Performed by: INTERNAL MEDICINE

## 2021-10-15 PROCEDURE — 86920 COMPATIBILITY TEST SPIN: CPT

## 2021-10-15 PROCEDURE — 86901 BLOOD TYPING SEROLOGIC RH(D): CPT

## 2021-10-15 RX ORDER — PANTOPRAZOLE SODIUM 20 MG/1
20 TABLET, DELAYED RELEASE ORAL 2 TIMES DAILY
COMMUNITY

## 2021-10-15 RX ORDER — SODIUM CHLORIDE 9 MG/ML
250 INJECTION, SOLUTION INTRAVENOUS AS NEEDED
Status: CANCELLED | OUTPATIENT
Start: 2021-10-15

## 2021-10-15 RX ORDER — SODIUM CHLORIDE 9 MG/ML
250 INJECTION, SOLUTION INTRAVENOUS AS NEEDED
Status: DISCONTINUED | OUTPATIENT
Start: 2021-10-15 | End: 2021-10-15 | Stop reason: HOSPADM

## 2021-10-15 RX ORDER — PREDNISONE 20 MG/1
80 TABLET ORAL DAILY
Qty: 100 TABLET | Refills: 0 | Status: SHIPPED | OUTPATIENT
Start: 2021-10-15 | End: 2021-10-27

## 2021-10-15 RX ADMIN — SODIUM CHLORIDE 250 ML: 9 INJECTION, SOLUTION INTRAVENOUS at 12:51

## 2021-10-15 NOTE — TELEPHONE ENCOUNTER
Received call from patient , he calls to report patient was d/c home Wed 10/13/21 with labs   HGB: 7.9  PLT: 12  He says since patient arrived home she has become progressively weak and fatigued and is just not feeling well. He questions if Dr Blanchard has notified Dr Galvez regarding planned care and weekly lab draws to check her counts, and is concerned that her HGB/PLT counts have dropped from last time checked and she may need transfusion prior to weekend.     Discussed with Dr Galvez, he recommends orders for   CBC  CMP  T&C  Tentative chair for blood transfusion was requested (micheal) ETA 10:30 am  Be placed today and have patient come in for labs and possible blood transfusion.

## 2021-10-15 NOTE — PROGRESS NOTES
[facility] ONC [facility] GROUP HEMATOLOGY & ONCOLOGY  [address]  [place] [facility] [address]  [phone]       10/15/2021     PROGRESS NOTE     PATIENT NAME: Ratna Cohen  YOB: 1955  66 y.o. MR: [id number]    PCP: Faith Alcaraz APRN    HPI:   Ms. Ratna Cohen called this morning complaining of extreme fatigue and exertional dyspnea.  She came accompanied by her .  The daughter who is RN participated on the visit through the speaker phone. The patient is undergoing evaluation at [facility].  She had a bone marrow biopsy on 10/4/2021.  We discussed the results.  Fortunately it did not show evidence of leukemia or aplastic anemia.  The laboratory testing performed on 10/13/2021 at [facility] showed evidence of hemolysis, noting very low haptoglobin, increased LDH and reticulocyte count. The bilirubin was normal.  We also reviewed that previously the RBC antibody screen was negative so that does not corroborate immune mediated hemolysis but I think at this point low level antibody cannot be excluded. Because the patient is symptomatic she agreed to receive PRBC today.  We also discussed treatment with prednisone for the possibility of immune related hemolysis and thrombocytopenia.    PAST HISTORY:     HEME/ONC HISTORY  Oncology/Hematology History Overview Note     HEME/ONC DX/RX/INVESTIGATIONS SUMMARY:   DX:   Pancytopenia (neutropenia, anemia, thrombocytopenia).   Unremarkable BMBX on 10/4/2021. Hemolysis. Negative RBC ab.  Anemic symptoms started in 2020, petechiae started in 6/2021.    RX:   9/21/2021, 2U PRBCs and 2 platelet transfusions.  10/15/2021, 1U PRBC.  Prednisone: 10/15/2021, start 80 mg QD.    OTHER INFO:   No prior hematological history or significant medical history until 9/21/2021.    Stress test was positive during admission at [facility] in 9/21/2021, cardiac cath not possible because of  thrombocytopenia.  Patient never had chest pain, only dyspnea and weakness.    INVESTIGATIONS:   9/21/2021-9:48 AM, WBC 3.4 hemoglobin 7.7 MCV and 15.8 platelets 12 neutrophils 1.1 lymphocytes 1.8.  9/21/2021-17:20 PM, WBC 3.9 hemoglobin 8.3 .1 platelets 23.  9/22/2021, folic acid 25.9 ferritin 365 B12 368 HBsAg negative, HIV negative, hep C antibody negative reticulocyte 4.0%, PT 10.7, INR 1.0 PTT 25.3. Fecal occult blood negative.  9/22/2021, 1:37 AM, WBC 3.6 hemoglobin 7.0 .5 platelets 11 neutrophils 1.06 lymphocytes 2.19.  9/23/2021, WBC 4.0 hemoglobin 10.4 .1 platelets 12.  Neutrophils 1.38 lymphocytes 2.0. BUN 11 creatinine 0.47 calcium 8.5 bilirubin 0.7 ALT 31 AST 26 alk phos 68 albumin 3.7 total protein 7.1.  10/4/201, Diagnosis: BONE MARROW - PERIPHERAL BLOOD SMEAR, ASPIRATE SMEAR, PARTICLE PREPARATION, BIOPSY AND FLOW CYTOMETRY: NORMOCELLULAR MARROW WITH MATURING TRILINEAGE HEMATOPOIESIS, ERYTHROID ATYPIA, AND NO INCREASE IN BLASTS; SEE IMPRESSION.   10/4/2021, Myeloid NGS Panel RESULTS SUMMARY: (Final).Inconclusive: A variant of uncertain significance was detected in this patient's sample.   10/13/2021, EPO 1,966.  Haptoglobin < 8.  .  Reticulocyte 4%.  10/15/2021, WBC 3.71 hemoglobin 7.6 .3 platelets 11.       MEDICAL HISTORY   has a past medical history of Acid reflux and Anemia.   HEALTH MAINTENANCE ITEMS  Health Maintenance Due   Topic Date Due   • COLORECTAL CANCER SCREENING  Never done   • ANNUAL PHYSICAL  Never done   • TDAP/TD VACCINES (1 - Tdap) Never done   • ZOSTER VACCINE (1 of 2) Never done   • INFLUENZA VACCINE  Never done   • HEPATITIS C SCREENING  Never done     <no information>  Last Completed Colonoscopy     This patient has no relevant Health Maintenance data.          There is no immunization history on file for this patient.  Last Completed Mammogram          MAMMOGRAM (Every 2 Years) Next due on 9/1/2023 09/01/2021  Outside Procedure: HC  "MAMMOGRAM SCREENING BILAT DIGITAL W CAD              FAMILY HISTORY  Family History   Problem Relation Age of Onset   • Diabetes Mother    • Hypertension Mother    • Heart attack Mother    • Hypertension Father    • Heart attack Father        Cancer-related family history is not on file.   SURGICAL HISTORY   has a past surgical history that includes Appendectomy.  SOCIAL HISTORY  Social History     Socioeconomic History   • Marital status:    Tobacco Use   • Smoking status: Never Smoker   Vaping Use   • Vaping Use: Never used   Substance and Sexual Activity   • Alcohol use: Not Currently   • Sexual activity: Yes     MEDICATIONS:     Current Outpatient Medications   Medication Instructions   • atorvastatin (LIPITOR) 80 mg, Oral, Every Night at Bedtime   • calcium carbonate (OS-HOLDEN) 600 mg, Oral, Daily   • estradiol (ESTRACE) 1 g, Vaginal   • isosorbide mononitrate (IMDUR) 30 mg, Oral, Daily   • metoprolol succinate XL (TOPROL-XL) 25 MG 24 hr tablet TAKE 1 TABLET BY MOUTH ONCE DAILY AT 8 AM   • multivitamin with minerals tablet tablet 1 tablet, Oral, Daily   • pantoprazole (PROTONIX) 20 mg, Oral, Daily   • predniSONE (DELTASONE) 80 mg, Oral, Daily   • psyllium (KONSYL) 28.3 % pack pack Oral, Daily   • vitamin C (ASCORBIC ACID) 250 mg, Oral, Daily   • vitamin D3 5,000 Units, Oral, Daily   • zinc sulfate (ZINCATE) 220 mg, Oral, Daily      ALLERGIES:   No Known Allergies  ROS:   Pertinent positive and negative findings as noted in HPI.   PHYSICAL EXAM:   /60   Pulse 102   Temp 97.1 °F (36.2 °C)   Resp 16   Ht 152.4 cm (60\")   Wt 64.3 kg (141 lb 12.8 oz)   SpO2 99%   Breastfeeding No   BMI 27.69 kg/m²  Body surface area is 1.61 meters squared.   Pain Score    10/15/21 1052   PainSc: 0-No pain     Alert, oriented x3, cooperative, not in distress, pale.  HEENT: Normocephalic, wearing a facemask.  Lungs: No tachypnea.   Extremities: No ankle edema.  Neurologic: Moves all extremities. "     INVESTIGATIONS/LABS:     Lab Results - Last 18 Months   Lab Units 10/15/21  1015 10/13/21  1151 09/27/21  0942   WBC 10*3/mm3 3.71  --  5.24   HEMOGLOBIN g/dL 7.6*  --  9.3*   HEMATOCRIT % 22.4*  --  27.4*   MCV fL 102.3*  --  101.1*   PLATELETS 10*3/mm3 11*  --  18*   IMM GRAN % % 0.3  --   --    NEUTROS ABS 10*3/mm3 1.33* 2.43 3.09   LYMPHS ABS 10*3/mm3 1.96 2.05  --    MONOS ABS 10*3/mm3 0.37 0.21*  --    EOS ABS 10*3/mm3 0.03 0.04  --    EOSINOPHIL ABS %  --  0.9  --    BASOS ABS 10*3/mm3 0.01  --   --    IMMATURE GRANS (ABS) 10*3/mm3 0.01  --   --    NRBC /100 WBC 0.5*  --   --    NEUTROPHIL % %  --   --  56.0   MONOCYTES % %  --  4.3 7.0   ANISOCYTOSIS   --  3+ Slight/1+       Lab Results - Last 18 Months   Lab Units 09/27/21  0942   BUN mg/dL 10   CREATININE mg/dL 0.44*   GLUCOSE mg/dL 107*   SODIUM mmol/L 141   POTASSIUM mmol/L 3.3*   CO2 mmol/L 27.0   CHLORIDE mmol/L 106   ANION GAP mmol/L 8.0   BUN / CREAT RATIO  22.7   CALCIUM mg/dL 9.0   EGFR IF NONAFRICN AM mL/min/1.73 144   ALK PHOS U/L 70   TOTAL PROTEIN g/dL 7.2   ALT (SGPT) U/L 18   AST (SGOT) U/L 22   BILIRUBIN mg/dL 0.7   ALBUMIN g/dL 4.20   GLOBULIN gm/dL 3.0       Lab Results - Last 18 Months   Lab Units 10/13/21  1008 09/27/21  0942   LDH unit/L 244* 280*       Lab Results - Last 18 Months   Lab Units 10/13/21  1151 10/13/21  1008 10/01/21  1116 09/27/21  0942   IRON mcg/dL  --   --  163 141   TIBC mcg/mL  --   --  222* 280*   IRON SATURATION %  --   --  73 50   FERRITIN ng/mL  --   --  418* 472.40*   VITAMIN B 12 pg/mL  --  753  --  534   FOLATE ng/mL  --  16.6  --  >20.00   TSH mcunit/mL 1.291  --   --   --        No radiology results for the last 90 days.    ASSESSMENT:   Pancytopenia associated with minimal neutropenia, severe anemia, severe thrombocytopenia.  The bone marrow biopsy on 10/4/2021 did not show evidence of a primary bone marrow disorder.  There is laboratory evidence of hemolysis, discussed above.  Type and screen of PRBCs  has not demonstrated RBC antibodies.  Differential diagnoses include PNH, other Gia negative hemolytic anemia, Naveen's syndrome, ITP.    Today the patient is symptomatic from the anemia.  The hemoglobin was 7.6 today and it is trending downwards.    PLAN/RECOMMENDATIONS:   Additional lab now ([facility] panel).    Transfuse 1 U of PRBC today.    Start prednisone 80 mg daily.    VST on 10/21/2021, labs 1h PTV.    DX/ORDERS:   Diagnoses and all orders for this visit:    1. Pancytopenia (HCC) (Primary)  -     CBC Auto Differential; Future  -     Direct Antiglobulin Test (Direct Gia); Future  -     Comprehensive Metabolic Panel; Future  -     Iron Profile; Future  -     Ferritin; Future  -     Vitamin B12 & Folate; Future  -     Type & Screen; Future  -     Cancel: PNH Profile; Future  -     [facility] Profile; Future    Other orders  -     predniSONE (DELTASONE) 20 MG tablet; Take 4 tablets by mouth Daily for 14 days.  Dispense: 100 tablet; Refill: 0        Future Appointments   Date Time Provider Department Center   10/21/2021  8:00 AM [facility] CANCER CTR LAB [facility] CCLAB [facility]   10/21/2021  9:00 AM Devante Galvez MD [facility] ONC PAD [facility]   10/27/2021 10:30 AM Armando Chaves [place] [facility] [place] [facility]        Devante Galvez MD  10/15/2021    cc:  Faith Alcaraz APRN

## 2021-10-15 NOTE — TELEPHONE ENCOUNTER
CRITICAL LAB VALUES:    Received call from [facility] with CRITICAL LAB VALUE:    PLT: 11  HGB: 7.6  HCT: 22.4  WBC: 3.71  This information was sent to Dr Galvez for review and to address.

## 2021-10-15 NOTE — TELEPHONE ENCOUNTER
Called patient to let her know that I heard back from the lab which states they can't do the PNH on Fridays, only Monday-Thursday. Per Dr. Galvez this is very important to get done so patient verbalized that she will come Monday morning and get this completed.    RP

## 2021-10-16 LAB
BH BB BLOOD EXPIRATION DATE: NORMAL
BH BB BLOOD TYPE BARCODE: 600
BH BB DISPENSE STATUS: NORMAL
BH BB PRODUCT CODE: NORMAL
BH BB UNIT NUMBER: NORMAL
CROSSMATCH INTERPRETATION: NORMAL
UNIT  ABO: NORMAL
UNIT  RH: NORMAL

## 2021-10-18 ENCOUNTER — LAB (OUTPATIENT)
Dept: LAB | Facility: HOSPITAL | Age: 66
End: 2021-10-18

## 2021-10-18 ENCOUNTER — TELEPHONE (OUTPATIENT)
Dept: ONCOLOGY | Facility: CLINIC | Age: 66
End: 2021-10-18

## 2021-10-18 DIAGNOSIS — D61.818 PANCYTOPENIA (HCC): ICD-10-CM

## 2021-10-18 LAB
ALBUMIN SERPL-MCNC: 4.5 G/DL (ref 3.5–5.2)
ALBUMIN/GLOB SERPL: 1.5 G/DL
ALP SERPL-CCNC: 67 U/L (ref 39–117)
ALT SERPL W P-5'-P-CCNC: 42 U/L (ref 1–33)
ANION GAP SERPL CALCULATED.3IONS-SCNC: 9 MMOL/L (ref 5–15)
AST SERPL-CCNC: 26 U/L (ref 1–32)
BASOPHILS # BLD AUTO: 0.01 10*3/MM3 (ref 0–0.2)
BASOPHILS NFR BLD AUTO: 0.1 % (ref 0–1.5)
BILIRUB SERPL-MCNC: 0.8 MG/DL (ref 0–1.2)
BUN SERPL-MCNC: 10 MG/DL (ref 8–23)
BUN/CREAT SERPL: 21.7 (ref 7–25)
CALCIUM SPEC-SCNC: 9 MG/DL (ref 8.6–10.5)
CHLORIDE SERPL-SCNC: 103 MMOL/L (ref 98–107)
CO2 SERPL-SCNC: 28 MMOL/L (ref 22–29)
CREAT SERPL-MCNC: 0.46 MG/DL (ref 0.57–1)
DAT POLY-SP REAG RBC QL: NEGATIVE
DEPRECATED RDW RBC AUTO: 69.6 FL (ref 37–54)
EOSINOPHIL # BLD AUTO: 0 10*3/MM3 (ref 0–0.4)
EOSINOPHIL NFR BLD AUTO: 0 % (ref 0.3–6.2)
ERYTHROCYTE [DISTWIDTH] IN BLOOD BY AUTOMATED COUNT: 21 % (ref 12.3–15.4)
FERRITIN SERPL-MCNC: 872 NG/ML (ref 13–150)
FOLATE SERPL-MCNC: 17.1 NG/ML (ref 4.78–24.2)
GFR SERPL CREATININE-BSD FRML MDRD: 137 ML/MIN/1.73
GLOBULIN UR ELPH-MCNC: 3 GM/DL
GLUCOSE SERPL-MCNC: 124 MG/DL (ref 65–99)
HCT VFR BLD AUTO: 25 % (ref 34–46.6)
HGB BLD-MCNC: 8.5 G/DL (ref 12–15.9)
IRON 24H UR-MRATE: 230 MCG/DL (ref 37–145)
IRON SATN MFR SERPL: 82 % (ref 20–50)
LYMPHOCYTES # BLD AUTO: 3.01 10*3/MM3 (ref 0.7–3.1)
LYMPHOCYTES NFR BLD AUTO: 44.8 % (ref 19.6–45.3)
MCH RBC QN AUTO: 33.1 PG (ref 26.6–33)
MCHC RBC AUTO-ENTMCNC: 34 G/DL (ref 31.5–35.7)
MCV RBC AUTO: 97.3 FL (ref 79–97)
MONOCYTES # BLD AUTO: 0.53 10*3/MM3 (ref 0.1–0.9)
MONOCYTES NFR BLD AUTO: 7.9 % (ref 5–12)
NEUTROPHILS NFR BLD AUTO: 3.13 10*3/MM3 (ref 1.7–7)
NEUTROPHILS NFR BLD AUTO: 46.6 % (ref 42.7–76)
PLATELET # BLD AUTO: 13 10*3/MM3 (ref 140–450)
PMV BLD AUTO: 11.2 FL (ref 6–12)
POTASSIUM SERPL-SCNC: 3 MMOL/L (ref 3.5–5.2)
PROT SERPL-MCNC: 7.5 G/DL (ref 6–8.5)
RBC # BLD AUTO: 2.57 10*6/MM3 (ref 3.77–5.28)
SODIUM SERPL-SCNC: 140 MMOL/L (ref 136–145)
TIBC SERPL-MCNC: 282 MCG/DL (ref 298–536)
TRANSFERRIN SERPL-MCNC: 189 MG/DL (ref 200–360)
VIT B12 BLD-MCNC: 665 PG/ML (ref 211–946)
WBC # BLD AUTO: 6.72 10*3/MM3 (ref 3.4–10.8)

## 2021-10-18 PROCEDURE — 82607 VITAMIN B-12: CPT

## 2021-10-18 PROCEDURE — 82746 ASSAY OF FOLIC ACID SERUM: CPT

## 2021-10-18 PROCEDURE — 85025 COMPLETE CBC W/AUTO DIFF WBC: CPT

## 2021-10-18 PROCEDURE — 88184 FLOWCYTOMETRY/ TC 1 MARKER: CPT

## 2021-10-18 PROCEDURE — 80053 COMPREHEN METABOLIC PANEL: CPT

## 2021-10-18 PROCEDURE — 82728 ASSAY OF FERRITIN: CPT

## 2021-10-18 PROCEDURE — 84466 ASSAY OF TRANSFERRIN: CPT

## 2021-10-18 PROCEDURE — 88185 FLOWCYTOMETRY/TC ADD-ON: CPT

## 2021-10-18 PROCEDURE — 86880 COOMBS TEST DIRECT: CPT

## 2021-10-18 PROCEDURE — 83540 ASSAY OF IRON: CPT

## 2021-10-18 PROCEDURE — 36415 COLL VENOUS BLD VENIPUNCTURE: CPT

## 2021-10-21 ENCOUNTER — OFFICE VISIT (OUTPATIENT)
Dept: ONCOLOGY | Facility: CLINIC | Age: 66
End: 2021-10-21

## 2021-10-21 ENCOUNTER — LAB (OUTPATIENT)
Dept: LAB | Facility: HOSPITAL | Age: 66
End: 2021-10-21

## 2021-10-21 ENCOUNTER — TELEPHONE (OUTPATIENT)
Dept: ONCOLOGY | Facility: CLINIC | Age: 66
End: 2021-10-21

## 2021-10-21 VITALS
TEMPERATURE: 97.8 F | HEIGHT: 62 IN | DIASTOLIC BLOOD PRESSURE: 70 MMHG | SYSTOLIC BLOOD PRESSURE: 126 MMHG | BODY MASS INDEX: 25.62 KG/M2 | OXYGEN SATURATION: 98 % | RESPIRATION RATE: 16 BRPM | WEIGHT: 139.2 LBS | HEART RATE: 87 BPM

## 2021-10-21 DIAGNOSIS — D61.818 PANCYTOPENIA (HCC): ICD-10-CM

## 2021-10-21 DIAGNOSIS — D61.818 PANCYTOPENIA (HCC): Primary | ICD-10-CM

## 2021-10-21 LAB
ANISOCYTOSIS BLD QL: ABNORMAL
DEPRECATED RDW RBC AUTO: 69.5 FL (ref 37–54)
ERYTHROCYTE [DISTWIDTH] IN BLOOD BY AUTOMATED COUNT: 21.2 % (ref 12.3–15.4)
HAPTOGLOB SERPL-MCNC: <10 MG/DL (ref 30–200)
HCT VFR BLD AUTO: 26.4 % (ref 34–46.6)
HGB BLD-MCNC: 9.3 G/DL (ref 12–15.9)
HOLD SPECIMEN: NORMAL
HOLD SPECIMEN: NORMAL
LDH SERPL-CCNC: 270 U/L (ref 135–214)
LYMPHOCYTES # BLD MANUAL: 5.96 10*3/MM3 (ref 0.7–3.1)
LYMPHOCYTES NFR BLD MANUAL: 4.1 % (ref 5–12)
LYMPHOCYTES NFR BLD MANUAL: 68.4 % (ref 19.6–45.3)
MACROCYTES BLD QL SMEAR: ABNORMAL
MCH RBC QN AUTO: 34.6 PG (ref 26.6–33)
MCHC RBC AUTO-ENTMCNC: 35.2 G/DL (ref 31.5–35.7)
MCV RBC AUTO: 98.1 FL (ref 79–97)
MONOCYTES # BLD AUTO: 0.36 10*3/MM3 (ref 0.1–0.9)
NEUTROPHILS # BLD AUTO: 2.4 10*3/MM3 (ref 1.7–7)
NEUTROPHILS NFR BLD MANUAL: 24.5 % (ref 42.7–76)
NEUTS BAND NFR BLD MANUAL: 3.1 % (ref 0–5)
PLATELET # BLD AUTO: 13 10*3/MM3 (ref 140–450)
PMV BLD AUTO: 10 FL (ref 6–12)
RBC # BLD AUTO: 2.69 10*6/MM3 (ref 3.77–5.28)
RETICS # AUTO: 0.1 10*6/MM3 (ref 0.02–0.13)
RETICS/RBC NFR AUTO: 3.88 % (ref 0.7–1.9)
SMALL PLATELETS BLD QL SMEAR: ABNORMAL
WBC # BLD AUTO: 8.71 10*3/MM3 (ref 3.4–10.8)
WBC MORPH BLD: NORMAL

## 2021-10-21 PROCEDURE — 85007 BL SMEAR W/DIFF WBC COUNT: CPT

## 2021-10-21 PROCEDURE — 85045 AUTOMATED RETICULOCYTE COUNT: CPT | Performed by: INTERNAL MEDICINE

## 2021-10-21 PROCEDURE — 99214 OFFICE O/P EST MOD 30 MIN: CPT | Performed by: INTERNAL MEDICINE

## 2021-10-21 PROCEDURE — 36415 COLL VENOUS BLD VENIPUNCTURE: CPT

## 2021-10-21 PROCEDURE — 83010 ASSAY OF HAPTOGLOBIN QUANT: CPT | Performed by: INTERNAL MEDICINE

## 2021-10-21 PROCEDURE — 85025 COMPLETE CBC W/AUTO DIFF WBC: CPT

## 2021-10-21 PROCEDURE — 83615 LACTATE (LD) (LDH) ENZYME: CPT

## 2021-10-21 NOTE — TELEPHONE ENCOUNTER
Spoke with Rossana from lab this morning and she states Ratna Cohen platelets are critical at 13. Dr. Galvez is seeing this patient this morning and will go over her labs.     RP

## 2021-10-21 NOTE — PROGRESS NOTES
[facility] ONC [facility] GROUP HEMATOLOGY & ONCOLOGY  [address]  [place] [facility] [address]  [phone]       10/21/2021     PROGRESS NOTE     PATIENT NAME: Ratna Cohen  YOB: 1955  66 y.o. MR: [id number]    PCP: Faith Alcaraz APRN    HPI:   Ms. Ratna Cohen feels fair today.  She started prednisone last week.  She came accompanied by her .  She is tolerating prednisone well.  We reviewed that today the hemoglobin was improved 9.3 compared to 8.5 last week but she also received 1 unit of PRBC last week.  The platelet count has remained stable at 13.    The patient is undergoing evaluation at [facility].  She spoke with the consulting hematologist there by phone a few days ago when he recommended CT chest/abdomen so the patient would like to have that ordered.  PAST HISTORY:     HEME/ONC HISTORY  Oncology/Hematology History Overview Note     HEME/ONC DX/RX/INVESTIGATIONS SUMMARY:   DX:   Pancytopenia (minimal neutropenia, severe anemia, severe thrombocytopenia).   Hemolysis. Negative RBC ab (Gia negative hemolytic anemia).   Unremarkable BMBX on 10/4/2021. Anemic symptoms started in 2020, petechiae started in 6/2021.  10/21/2021, iron overload probably 2nd PRBCs.    RX:   9/21/2021, 2U PRBCs and 2 platelet transfusions.  10/15/2021, 1U PRBC.  Prednisone: 10/15/2021, start 80 mg QD.    OTHER INFO:   No prior hematological history or significant medical history until 9/21/2021.    Stress test was positive during admission at [facility] in 9/21/2021, cardiac cath not possible because of thrombocytopenia.  Patient never had chest pain, only dyspnea and weakness.    INVESTIGATIONS:   9/21/2021-9:48 AM, WBC 3.4 hemoglobin 7.7 MCV and 15.8 platelets 12 neutrophils 1.1 lymphocytes 1.8.  9/21/2021-17:20 PM, WBC 3.9 hemoglobin 8.3 .1 platelets 23.  9/22/2021, folic acid 25.9 ferritin 365 B12 368 HBsAg  negative, HIV negative, hep C antibody negative reticulocyte 4.0%, PT 10.7, INR 1.0 PTT 25.3. Fecal occult blood negative.  9/22/2021, 1:37 AM, WBC 3.6 hemoglobin 7.0 .5 platelets 11 neutrophils 1.06 lymphocytes 2.19.  9/23/2021, WBC 4.0 hemoglobin 10.4 .1 platelets 12.  Neutrophils 1.38 lymphocytes 2.0. BUN 11 creatinine 0.47 calcium 8.5 bilirubin 0.7 ALT 31 AST 26 alk phos 68 albumin 3.7 total protein 7.1.  10/4/201, Diagnosis: BONE MARROW - PERIPHERAL BLOOD SMEAR, ASPIRATE SMEAR, PARTICLE PREPARATION, BIOPSY AND FLOW CYTOMETRY: NORMOCELLULAR MARROW WITH MATURING TRILINEAGE HEMATOPOIESIS, ERYTHROID ATYPIA, AND NO INCREASE IN BLASTS; SEE IMPRESSION.   10/4/2021, Myeloid NGS Panel RESULTS SUMMARY: (Final).Inconclusive: A variant of uncertain significance was detected in this patient's sample.   10/13/2021, EPO 1,966.  Haptoglobin < 8.  .  Reticulocyte 4%.  10/15/2021, WBC 3.71 hemoglobin 7.6 .3 platelets 11.       MEDICAL HISTORY   has a past medical history of Acid reflux and Anemia.   HEALTH MAINTENANCE ITEMS  Health Maintenance Due   Topic Date Due   • COLORECTAL CANCER SCREENING  Never done   • ANNUAL PHYSICAL  Never done   • TDAP/TD VACCINES (1 - Tdap) Never done   • ZOSTER VACCINE (1 of 2) Never done   • INFLUENZA VACCINE  Never done   • HEPATITIS C SCREENING  Never done     <no information>  Last Completed Colonoscopy     This patient has no relevant Health Maintenance data.          There is no immunization history on file for this patient.  Last Completed Mammogram          MAMMOGRAM (Every 2 Years) Next due on 9/1/2023 09/01/2021  Outside Procedure: HC MAMMOGRAM SCREENING BILAT DIGITAL W CAD              FAMILY HISTORY  Family History   Problem Relation Age of Onset   • Diabetes Mother    • Hypertension Mother    • Heart attack Mother    • Hypertension Father    • Heart attack Father        Cancer-related family history is not on file.   SURGICAL HISTORY   has a past  "surgical history that includes Appendectomy.  SOCIAL HISTORY  Social History     Socioeconomic History   • Marital status:    Tobacco Use   • Smoking status: Never Smoker   Vaping Use   • Vaping Use: Never used   Substance and Sexual Activity   • Alcohol use: Not Currently   • Sexual activity: Yes     MEDICATIONS:     Current Outpatient Medications   Medication Instructions   • atorvastatin (LIPITOR) 80 mg, Oral, Every Night at Bedtime   • calcium carbonate (OS-HOLDEN) 600 mg, Oral, Daily   • estradiol (ESTRACE) 1 g, Vaginal   • isosorbide mononitrate (IMDUR) 30 mg, Oral, Daily   • metoprolol succinate XL (TOPROL-XL) 25 MG 24 hr tablet TAKE 1 TABLET BY MOUTH ONCE DAILY AT 8 AM   • multivitamin with minerals tablet tablet 1 tablet, Oral, Daily   • pantoprazole (PROTONIX) 20 mg, Oral, Daily   • predniSONE (DELTASONE) 80 mg, Oral, Daily   • psyllium (KONSYL) 28.3 % pack pack Oral, Daily   • vitamin C (ASCORBIC ACID) 250 mg, Oral, Daily   • vitamin D3 5,000 Units, Oral, Daily   • zinc sulfate (ZINCATE) 220 mg, Oral, Daily      ALLERGIES:   No Known Allergies  ROS:   Pertinent positive and negative findings as noted in HPI.   PHYSICAL EXAM:   /70   Pulse 87   Temp 97.8 °F (36.6 °C)   Resp 16   Ht 156.2 cm (61.5\")   Wt 63.1 kg (139 lb 3.2 oz)   SpO2 98%   Breastfeeding No   BMI 25.88 kg/m²  Body surface area is 1.63 meters squared.   Pain Score    10/21/21 0816   PainSc:   1     Alert, oriented x3, cooperative, not in distress.  HEENT: Normocephalic, wearing a facemask.  Lungs: No tachypnea.   Extremities: No ankle edema.  Neurologic: Moves all extremities.    INVESTIGATIONS/LABS:     Lab Results - Last 18 Months   Lab Units 10/21/21  0800 10/18/21  1018 10/15/21  1015 10/13/21  1151 09/27/21  0942   WBC 10*3/mm3 8.71 6.72 3.71  --  5.24   HEMOGLOBIN g/dL 9.3* 8.5* 7.6*  --  9.3*   HEMATOCRIT % 26.4* 25.0* 22.4*  --  27.4*   MCV fL 98.1* 97.3* 102.3*  --  101.1*   PLATELETS 10*3/mm3 13* 13* 11*  --  18* "   IMM GRAN % %  --   --  0.3  --   --    NEUTROS ABS 10*3/mm3 2.40 3.13 1.33* 2.43 3.09   LYMPHS ABS 10*3/mm3  --  3.01 1.96 2.05  --    MONOS ABS 10*3/mm3  --  0.53 0.37 0.21*  --    EOS ABS 10*3/mm3  --  0.00 0.03 0.04  --    EOSINOPHIL ABS %  --   --   --  0.9  --    BASOS ABS 10*3/mm3  --  0.01 0.01  --   --    IMMATURE GRANS (ABS) 10*3/mm3  --   --  0.01  --   --    NRBC /100 WBC  --   --  0.5*  --   --    NEUTROPHIL % % 24.5*  --   --   --  56.0   MONOCYTES % % 4.1*  --   --  4.3 7.0   ANISOCYTOSIS  Slight/1+  --   --  3+ Slight/1+       Lab Results - Last 18 Months   Lab Units 10/18/21  1018 09/27/21  0942   BUN mg/dL 10 10   CREATININE mg/dL 0.46* 0.44*   GLUCOSE mg/dL 124* 107*   SODIUM mmol/L 140 141   POTASSIUM mmol/L 3.0* 3.3*   CO2 mmol/L 28.0 27.0   CHLORIDE mmol/L 103 106   ANION GAP mmol/L 9.0 8.0   BUN / CREAT RATIO  21.7 22.7   CALCIUM mg/dL 9.0 9.0   EGFR IF NONAFRICN AM mL/min/1.73 137 144   ALK PHOS U/L 67 70   TOTAL PROTEIN g/dL 7.5 7.2   ALT (SGPT) U/L 42* 18   AST (SGOT) U/L 26 22   BILIRUBIN mg/dL 0.8 0.7   ALBUMIN g/dL 4.50 4.20   GLOBULIN gm/dL 3.0 3.0       Lab Results - Last 18 Months   Lab Units 10/21/21  0800 10/13/21  1008 09/27/21  0942   LDH U/L 270* 244* 280*       Lab Results - Last 18 Months   Lab Units 10/18/21  1018 10/13/21  1151 10/13/21  1008 10/01/21  1116 09/27/21  0942   IRON mcg/dL 230*  --   --  163 141   TIBC mcg/dL 282*  --   --  222* 280*   IRON SATURATION % 82*  --   --  73 50   FERRITIN ng/mL 872.00*  --   --  418* 472.40*   VITAMIN B 12 pg/mL 665  --  753  --  534   FOLATE ng/mL 17.10  --  16.6  --  >20.00   TSH mcunit/mL  --  1.291  --   --   --        No radiology results for the last 90 days.    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  WBC has normalized and the leukopenia was never severe.  The diagnosis remains unclear.      [facility] peripheral blood testing was drawn and results are pending.    She started prednisone  last week, 80 mg daily, is unclear whether she is responding to weight.  The hemoglobin has improved since last week but she was also transfused last week because of symptoms of anemia.    Iron overload secondary to PRBCs.    PLAN/RECOMMENDATIONS:   CT chest/abdomen.    VST in one week, labs 1h PTV.    DX/ORDERS:   Diagnoses and all orders for this visit:    1. Pancytopenia (HCC) (Primary)  -     Haptoglobin  -     Cancel: Reticulocytes; Future  -     Cancel: Lactate Dehydrogenase  -     CBC Auto Differential; Future  -     Comprehensive Metabolic Panel; Future  -     Haptoglobin; Future  -     Reticulocytes; Future  -     Lactate Dehydrogenase; Future  -     Reticulocytes  -     CT Abdomen Pelvis Without Contrast  -     CT Chest Without Contrast Diagnostic        Future Appointments   Date Time Provider Department Center   10/27/2021  9:00 AM [facility] [place] [facility] 1 [facility] CT DL [place]   10/27/2021 10:30 AM Armando Chaves MD [facility] CD [facility]   10/27/2021 12:30 PM [facility] CANCER CTR LAB [facility] CCLAB [facility]   10/27/2021  1:30 PM Devante Galvez MD [facility] ONC [facility]        Devante Galvez MD  10/21/2021    cc: No ref. provider found, Faith Alcaraz APRN

## 2021-10-22 ENCOUNTER — INFUSION (OUTPATIENT)
Dept: ONCOLOGY | Facility: HOSPITAL | Age: 66
End: 2021-10-22

## 2021-10-22 ENCOUNTER — DOCUMENTATION (OUTPATIENT)
Dept: ONCOLOGY | Facility: CLINIC | Age: 66
End: 2021-10-22

## 2021-10-22 VITALS
WEIGHT: 139.8 LBS | OXYGEN SATURATION: 100 % | TEMPERATURE: 98.5 F | BODY MASS INDEX: 25.73 KG/M2 | SYSTOLIC BLOOD PRESSURE: 167 MMHG | HEART RATE: 77 BPM | DIASTOLIC BLOOD PRESSURE: 43 MMHG | HEIGHT: 62 IN | RESPIRATION RATE: 18 BRPM

## 2021-10-22 DIAGNOSIS — Z23 NEED FOR MENINGOCOCCAL VACCINATION: Primary | ICD-10-CM

## 2021-10-22 DIAGNOSIS — D61.818 PANCYTOPENIA (HCC): Primary | ICD-10-CM

## 2021-10-22 PROBLEM — D59.5 PNH (PAROXYSMAL NOCTURNAL HEMOGLOBINURIA) (HCC): Status: ACTIVE | Noted: 2021-10-22

## 2021-10-22 LAB — CD59 CELLS BLD QL: NORMAL

## 2021-10-22 PROCEDURE — 25010000002 MENINGOCOCCAL B SUSPENSION PREFILLED SYRINGE: Performed by: INTERNAL MEDICINE

## 2021-10-22 PROCEDURE — 90471 IMMUNIZATION ADMIN: CPT

## 2021-10-22 PROCEDURE — 25010000002 MENINGOCOCCAL POLYSACCHARIDE INJECTION: Performed by: INTERNAL MEDICINE

## 2021-10-22 PROCEDURE — 90734 MENACWYD/MENACWYCRM VACC IM: CPT | Performed by: INTERNAL MEDICINE

## 2021-10-22 PROCEDURE — 90472 IMMUNIZATION ADMIN EACH ADD: CPT

## 2021-10-22 PROCEDURE — 96372 THER/PROPH/DIAG INJ SC/IM: CPT

## 2021-10-22 PROCEDURE — 90620 MENB-4C VACCINE IM: CPT | Performed by: INTERNAL MEDICINE

## 2021-10-22 RX ADMIN — NEISSERIA MENINGITIDIS GROUP A CAPSULAR POLYSACCHARIDE DIPHTHERIA TOXOID CONJUGATE ANTIGEN, NEISSERIA MENINGITIDIS GROUP C CAPSULAR POLYSACCHARIDE DIPHTHERIA TOXOID CONJUGATE ANTIGEN, NEISSERIA MENINGITIDIS GROUP Y CAPSULAR POLYSACCHARIDE DIPHTHERIA TOXOID CONJUGATE ANTIGEN, AND NEISSERIA MENINGITIDIS GROUP W-135 CAPSULAR POLYSACCHARIDE DIPHTHERIA TOXOID CONJUGATE ANTIGEN 0.5 ML: 4; 4; 4; 4 INJECTION, SOLUTION INTRAMUSCULAR at 14:20

## 2021-10-22 RX ADMIN — NEISSERIA MENINGITIDIS SEROGROUP B NHBA FUSION PROTEIN ANTIGEN, NEISSERIA MENINGITIDIS SEROGROUP B FHBP FUSION PROTEIN ANTIGEN AND NEISSERIA MENINGITIDIS SEROGROUP B NADA PROTEIN ANTIGEN 0.5 ML: 50; 50; 50; 25 INJECTION, SUSPENSION INTRAMUSCULAR at 14:22

## 2021-10-22 NOTE — PATIENT INSTRUCTIONS
Meningococcal Polysaccharide Vaccine injection  What is this medicine?  MENINGOCOCCAL POLYSACCHARIDE VACCINE ([facility] [place] IRINA alex vak SEEN) is a vaccine to protect from bacterial meningitis. This vaccine does not contain live bacteria. It will not cause a meningitis.  This medicine may be used for other purposes; ask your health care provider or pharmacist if you have questions.  COMMON BRAND NAME(S): Menomune [id number]  What should I tell my health care provider before I take this medicine?  They need to know if you have any of these conditions:  · fever or infection  · history of Guillain-Adair syndrome  · immune system problems  · an unusual or allergic reaction to meningococcal vaccine, latex, other medicines, foods, dyes, or preservatives  · pregnant or trying to get pregnant  · breast-feeding  How should I use this medicine?  This medicine is for injection under the skin. It is given by a health care professional in a hospital or clinic setting.  A copy of the Vaccine Information Statements will be given before each vaccination. Read this sheet carefully each time. The sheet may change frequently.  Talk to your pediatrician regarding the use of this medicine in children. While this drug may be prescribed for children as young as 2 years of age for selected conditions, precautions do apply.  Overdosage: If you think you have taken too much of this medicine contact a poison control center or emergency room at once.  NOTE: This medicine is only for you. Do not share this medicine with others.  What if I miss a dose?  This does not apply.  What may interact with this medicine?  · adalimumab  · anakinra  · etanercept  · infliximab  · medicines for organ transplant  · medicines to treat cancer  · other vaccines  · some medicines for arthritis  · steroid medicines like prednisone or cortisone  This list may not describe all possible interactions. Give your health care provider a list of all the medicines, herbs,  non-prescription drugs, or dietary supplements you use. Also tell them if you smoke, drink alcohol, or use illegal drugs. Some items may interact with your medicine.  What should I watch for while using this medicine?  This vaccine, like all vaccines, may not fully protect everyone.  Report any side effects that are worrisome to your doctor right away.  What side effects may I notice from receiving this medicine?  Side effects that you should report to your doctor or health care professional as soon as possible:  · allergic reactions like skin rash, itching or hives, swelling of the face, lips, or tongue  · breathing problems  · feeling faint or lightheaded, falls  · fever over 102 degrees F  · muscle weakness  · unusual drooping or paralysis of face  Side effects that usually do not require medical attention (report to your doctor or health care professional if they continue or are bothersome):  · chills  · diarrhea  · headache  · loss of appetite  · muscle aches and pains  · pain at site where injected  · tired  This list may not describe all possible side effects. Call your doctor for medical advice about side effects. You may report side effects to FDA at 5-615-FDA-2489.  Where should I keep my medicine?  This drug is given in a hospital or clinic and will not be stored at home.  NOTE: This sheet is a summary. It may not cover all possible information. If you have questions about this medicine, talk to your doctor, pharmacist, or health care provider.  © 2021 Elsevier/Gold Standard (2017-01-19 11:32:00)  Meningococcal Group B Vaccine (4 strain) suspension for injection  What is this medicine?  MENINGOCOCCAL GROUP B VACCINE, RECOMBINANT ([place] [facility] IRINA GODFREY) is a vaccine to protect from bacterial meningitis. This vaccine does not contain live bacteria. It will not cause a meningitis.  This medicine may be used for other purposes; ask your health care provider or pharmacist if you have questions.  COMMON  BRAND NAME(S): CHELA  What should I tell my health care provider before I take this medicine?  They need to know if you have any of these conditions:  · bleeding disorder  · fever or infection  · immune system problems  · an unusual or allergic reaction to meningococcal vaccine, other medicines, foods, dyes, or preservatives  · pregnant or trying to get pregnant  · breast-feeding  How should I use this medicine?  This medicine is for injection into a muscle. It is given by a health care professional in a hospital or clinic setting.  A copy of Vaccine Information Statements will be given before each vaccination. Read this sheet carefully each time. The sheet may change frequently.  Talk to your pediatrician regarding the use of this medicine in children. While this drug may be prescribed for children as young as 10 years of age for selected conditions, precautions do apply.  Overdosage: If you think you have taken too much of this medicine contact a poison control center or emergency room at once.  NOTE: This medicine is only for you. Do not share this medicine with others.  What if I miss a dose?  It is important not to miss your dose. Call your doctor or health care professional if you are unable to keep an appointment.  What may interact with this medicine?  · certain medicines that treat or prevent blood clots  · medicines that lower your chance of fighting infection  · other vaccines  This list may not describe all possible interactions. Give your health care provider a list of all the medicines, herbs, non-prescription drugs, or dietary supplements you use. Also tell them if you smoke, drink alcohol, or use illegal drugs. Some items may interact with your medicine.  What should I watch for while using this medicine?  Report any side effects that are worrisome to your doctor right away.  This vaccine may not protect from all meningitis infections.  What side effects may I notice from receiving this  medicine?  Side effects that you should report to your doctor or health care professional as soon as possible:  · allergic reactions like skin rash, itching or hives, swelling of the face, lips, or tongue  · breathing problems  Side effects that usually do not require medical attention (report to your doctor or health care professional if they continue or are bothersome):  · chills  · diarrhea  · fever  · headache  · joint pain  · muscle pain  · pain, redness, or irritation at site where injected  This list may not describe all possible side effects. Call your doctor for medical advice about side effects. You may report side effects to FDA at 0-746-FDA-0973.  Where should I keep my medicine?  This vaccine is given in a hospital or clinic and will not be stored at home.  NOTE: This sheet is a summary. It may not cover all possible information. If you have questions about this medicine, talk to your doctor, pharmacist, or health care provider.  © 2021 Elsevier/Gold Standard (2019-11-04 10:25:45)

## 2021-10-22 NOTE — PROGRESS NOTES
I called the patient and her  today. The patient is feeling well. I explained to them that the blood test was consistent with diagnosis of PNH. The patient's daughter is a nurse. I called back later after explaining the diagnosis so that the patient,  and the daughter would have time to read about this diagnosis. I recommended to start treatment with one of the two monoclonal antibodies that are currently approved for PNH. This case is also being discussed with the pharmacist, LANI Greco, Pharm.D., who is supervising and coordinated the anticipated treatment. I also discussed with the patient the recommended pretreatment vaccination with meningococcus vaccine and the patient agreed to do that. She will try to come to the office today to receive the vaccination.    10/18/2021,  PNH profile:  Glycosylphosphatidylinositol (GPI) anchor deficient cells   detected (4.48% of granulocytes, 5.93% of monocytes, 0.07%   of red blood cells have features of type II red blood   cells, 0.50% of red blood cells have features of type III   red blood cells)   DISCLAIMER: REFER TO HARDCOPY OR PDF FOR COMPLETE RESULT.   If synopsis provided, clinical decisions should not be   based on this interfaced synopsis alone.

## 2021-10-23 ENCOUNTER — APPOINTMENT (OUTPATIENT)
Dept: CT IMAGING | Facility: HOSPITAL | Age: 66
End: 2021-10-23

## 2021-10-23 ENCOUNTER — HOSPITAL ENCOUNTER (EMERGENCY)
Facility: HOSPITAL | Age: 66
Discharge: HOME | End: 2021-10-23
Admitting: EMERGENCY MEDICINE

## 2021-10-23 VITALS
WEIGHT: 140 LBS | DIASTOLIC BLOOD PRESSURE: 70 MMHG | HEART RATE: 89 BPM | OXYGEN SATURATION: 100 % | RESPIRATION RATE: 22 BRPM | SYSTOLIC BLOOD PRESSURE: 148 MMHG | BODY MASS INDEX: 25.76 KG/M2 | HEIGHT: 62 IN | TEMPERATURE: 98.5 F

## 2021-10-23 DIAGNOSIS — N39.0 URINARY TRACT INFECTION WITHOUT HEMATURIA, SITE UNSPECIFIED: ICD-10-CM

## 2021-10-23 DIAGNOSIS — R55 VASOVAGAL SYNCOPE: Primary | ICD-10-CM

## 2021-10-23 DIAGNOSIS — D59.5 PAROXYSMAL NOCTURNAL HEMOGLOBINURIA (HCC): ICD-10-CM

## 2021-10-23 LAB
ALBUMIN SERPL-MCNC: 4 G/DL (ref 3.5–5.2)
ALBUMIN/GLOB SERPL: 1.5 G/DL
ALP SERPL-CCNC: 57 U/L (ref 39–117)
ALT SERPL W P-5'-P-CCNC: 52 U/L (ref 1–33)
ANION GAP SERPL CALCULATED.3IONS-SCNC: 9 MMOL/L (ref 5–15)
ANISOCYTOSIS BLD QL: NORMAL
APTT PPP: 25.2 SECONDS (ref 24.1–35)
AST SERPL-CCNC: 18 U/L (ref 1–32)
BACTERIA UR QL AUTO: ABNORMAL /HPF
BASOPHILS # BLD AUTO: 0.01 10*3/MM3 (ref 0–0.2)
BASOPHILS NFR BLD AUTO: 0.1 % (ref 0–1.5)
BILIRUB SERPL-MCNC: 1.2 MG/DL (ref 0–1.2)
BILIRUB UR QL STRIP: NEGATIVE
BUN SERPL-MCNC: 12 MG/DL (ref 8–23)
BUN/CREAT SERPL: 24.5 (ref 7–25)
CALCIUM SPEC-SCNC: 8.6 MG/DL (ref 8.6–10.5)
CHLORIDE SERPL-SCNC: 102 MMOL/L (ref 98–107)
CLARITY UR: CLEAR
CO2 SERPL-SCNC: 30 MMOL/L (ref 22–29)
COLOR UR: YELLOW
CREAT SERPL-MCNC: 0.49 MG/DL (ref 0.57–1)
D DIMER PPP FEU-MCNC: 1.49 MG/L (FEU) (ref 0–0.5)
DEPRECATED RDW RBC AUTO: 73 FL (ref 37–54)
EOSINOPHIL # BLD AUTO: 0.06 10*3/MM3 (ref 0–0.4)
EOSINOPHIL NFR BLD AUTO: 0.6 % (ref 0.3–6.2)
ERYTHROCYTE [DISTWIDTH] IN BLOOD BY AUTOMATED COUNT: 21.3 % (ref 12.3–15.4)
GFR SERPL CREATININE-BSD FRML MDRD: 127 ML/MIN/1.73
GLOBULIN UR ELPH-MCNC: 2.6 GM/DL
GLUCOSE SERPL-MCNC: 92 MG/DL (ref 65–99)
GLUCOSE UR STRIP-MCNC: NEGATIVE MG/DL
HCT VFR BLD AUTO: 26.5 % (ref 34–46.6)
HGB BLD-MCNC: 8.9 G/DL (ref 12–15.9)
HGB UR QL STRIP.AUTO: NEGATIVE
HYALINE CASTS UR QL AUTO: ABNORMAL /LPF
INR PPP: 1.05 (ref 0.91–1.09)
KETONES UR QL STRIP: NEGATIVE
LEUKOCYTE ESTERASE UR QL STRIP.AUTO: NEGATIVE
LIPASE SERPL-CCNC: 29 U/L (ref 13–60)
LYMPHOCYTES # BLD AUTO: 4.05 10*3/MM3 (ref 0.7–3.1)
LYMPHOCYTES NFR BLD AUTO: 41.8 % (ref 19.6–45.3)
MCH RBC QN AUTO: 33.2 PG (ref 26.6–33)
MCHC RBC AUTO-ENTMCNC: 33.6 G/DL (ref 31.5–35.7)
MCV RBC AUTO: 98.9 FL (ref 79–97)
MONOCYTES # BLD AUTO: 0.84 10*3/MM3 (ref 0.1–0.9)
MONOCYTES NFR BLD AUTO: 8.7 % (ref 5–12)
NEUTROPHILS NFR BLD AUTO: 4.67 10*3/MM3 (ref 1.7–7)
NEUTROPHILS NFR BLD AUTO: 48.2 % (ref 42.7–76)
NITRITE UR QL STRIP: POSITIVE
PH UR STRIP.AUTO: 7 [PH] (ref 5–8)
PLATELET # BLD AUTO: 13 10*3/MM3 (ref 140–450)
POLYCHROMASIA BLD QL SMEAR: NORMAL
POTASSIUM SERPL-SCNC: 3.1 MMOL/L (ref 3.5–5.2)
PROT SERPL-MCNC: 6.6 G/DL (ref 6–8.5)
PROT UR QL STRIP: NEGATIVE
PROTHROMBIN TIME: 13.3 SECONDS (ref 11.9–14.6)
RBC # BLD AUTO: 2.68 10*6/MM3 (ref 3.77–5.28)
RBC # UR: ABNORMAL /HPF
REF LAB TEST METHOD: ABNORMAL
SMALL PLATELETS BLD QL SMEAR: NORMAL
SODIUM SERPL-SCNC: 141 MMOL/L (ref 136–145)
SP GR UR STRIP: 1.01 (ref 1–1.03)
SQUAMOUS #/AREA URNS HPF: ABNORMAL /HPF
TROPONIN T SERPL-MCNC: <0.01 NG/ML (ref 0–0.03)
UROBILINOGEN UR QL STRIP: ABNORMAL
WBC # BLD AUTO: 9.69 10*3/MM3 (ref 3.4–10.8)
WBC UR QL AUTO: ABNORMAL /HPF

## 2021-10-23 PROCEDURE — 85007 BL SMEAR W/DIFF WBC COUNT: CPT | Performed by: NURSE PRACTITIONER

## 2021-10-23 PROCEDURE — 85379 FIBRIN DEGRADATION QUANT: CPT | Performed by: NURSE PRACTITIONER

## 2021-10-23 PROCEDURE — 71275 CT ANGIOGRAPHY CHEST: CPT

## 2021-10-23 PROCEDURE — 93010 ELECTROCARDIOGRAM REPORT: CPT | Performed by: INTERNAL MEDICINE

## 2021-10-23 PROCEDURE — 85025 COMPLETE CBC W/AUTO DIFF WBC: CPT | Performed by: NURSE PRACTITIONER

## 2021-10-23 PROCEDURE — 70450 CT HEAD/BRAIN W/O DYE: CPT

## 2021-10-23 PROCEDURE — 80053 COMPREHEN METABOLIC PANEL: CPT | Performed by: NURSE PRACTITIONER

## 2021-10-23 PROCEDURE — 93005 ELECTROCARDIOGRAM TRACING: CPT

## 2021-10-23 PROCEDURE — 74174 CTA ABD&PLVS W/CONTRAST: CPT

## 2021-10-23 PROCEDURE — 93005 ELECTROCARDIOGRAM TRACING: CPT | Performed by: NURSE PRACTITIONER

## 2021-10-23 PROCEDURE — 85610 PROTHROMBIN TIME: CPT | Performed by: NURSE PRACTITIONER

## 2021-10-23 PROCEDURE — 81001 URINALYSIS AUTO W/SCOPE: CPT | Performed by: NURSE PRACTITIONER

## 2021-10-23 PROCEDURE — 84484 ASSAY OF TROPONIN QUANT: CPT | Performed by: NURSE PRACTITIONER

## 2021-10-23 PROCEDURE — 0 IOPAMIDOL PER 1 ML: Performed by: NURSE PRACTITIONER

## 2021-10-23 PROCEDURE — 85730 THROMBOPLASTIN TIME PARTIAL: CPT | Performed by: NURSE PRACTITIONER

## 2021-10-23 PROCEDURE — 83690 ASSAY OF LIPASE: CPT | Performed by: NURSE PRACTITIONER

## 2021-10-23 PROCEDURE — 99284 EMERGENCY DEPT VISIT MOD MDM: CPT

## 2021-10-23 RX ORDER — CEFDINIR 300 MG/1
300 CAPSULE ORAL 2 TIMES DAILY
Qty: 14 CAPSULE | Refills: 0 | Status: SHIPPED | OUTPATIENT
Start: 2021-10-23 | End: 2021-10-30

## 2021-10-23 RX ORDER — ONDANSETRON 4 MG/1
4 TABLET, ORALLY DISINTEGRATING ORAL EVERY 6 HOURS PRN
Qty: 12 TABLET | Refills: 0 | Status: SHIPPED | OUTPATIENT
Start: 2021-10-23 | End: 2021-11-05

## 2021-10-23 RX ADMIN — IOPAMIDOL 100 ML: 755 INJECTION, SOLUTION INTRAVENOUS at 12:52

## 2021-10-23 NOTE — ED PROVIDER NOTES
Subjective   Patient is a 66-year-old female with history significant for anemia and PNH.  She is followed by Dr. Galvez with hematology oncology.  Patient apparently had a recent diagnosis of pancytopenia.  She was referred to [place] who did a bone marrow biopsy.  Patient was just evaluated yesterday by Dr. Galvez who explained her blood testing was consistent with diagnosis of PNH.  Per records reviewed family wanted to further read about this diagnosis as all of this is new for them.  It was recommended that patient receive one of the 2 monoclonal antibodies that are currently approved for PNH.  Patient received meningococcus vaccine yesterday.  Patient states today she woke up feeling somewhat nauseated.  She states she got up to get crackers and felt lightheaded.  Patient states her  was in the kitchen and she yelled for him because she thought she may pass out.  Patient states she woke up lying in the floor with her  trying to get her awake.  She believes she may have had a full syncopal episode.  Patient denies hitting her head.  She believes her  helped keep her from injuring her head.  She reports chronic upper abdominal pain described as a pressure.  Patient denies any shortness of breath or chest pain.  Due to symptoms described she came to the ER for evaluation and treatment.          Review of Systems   Constitutional: Negative.  Negative for fever.   HENT: Negative.  Negative for congestion.    Eyes: Negative.    Respiratory: Negative.  Negative for cough and shortness of breath.    Cardiovascular: Negative.  Negative for chest pain.   Gastrointestinal: Positive for abdominal pain and nausea. Negative for diarrhea and vomiting.   Genitourinary: Negative.    Musculoskeletal: Negative.    Skin: Negative.    All other systems reviewed and are negative.      Past Medical History:   Diagnosis Date   • Acid reflux    • Anemia    • PNH (paroxysmal nocturnal hemoglobinuria) (HCC)         No Known Allergies    Past Surgical History:   Procedure Laterality Date   • APPENDECTOMY         Family History   Problem Relation Age of Onset   • Diabetes Mother    • Hypertension Mother    • Heart attack Mother    • Hypertension Father    • Heart attack Father        Social History     Socioeconomic History   • Marital status:    Tobacco Use   • Smoking status: Never Smoker   Vaping Use   • Vaping Use: Never used   Substance and Sexual Activity   • Alcohol use: Not Currently   • Sexual activity: Yes           Objective   Physical Exam  Vitals and nursing note reviewed.   Constitutional:       Appearance: She is well-developed.   HENT:      Head: Normocephalic and atraumatic.      Right Ear: External ear normal.      Left Ear: External ear normal.      Nose: Nose normal.      Mouth/Throat:      Mouth: Mucous membranes are moist.      Pharynx: Oropharynx is clear.   Eyes:      Conjunctiva/sclera: Conjunctivae normal.      Pupils: Pupils are equal, round, and reactive to light.   Cardiovascular:      Rate and Rhythm: Normal rate and regular rhythm.      Heart sounds: Normal heart sounds.   Pulmonary:      Effort: Pulmonary effort is normal.      Breath sounds: Normal breath sounds.   Abdominal:      General: Bowel sounds are normal.      Palpations: Abdomen is soft.      Tenderness: There is abdominal tenderness.   Musculoskeletal:         General: Normal range of motion.      Cervical back: Normal range of motion and neck supple.   Skin:     General: Skin is warm and dry.      Capillary Refill: Capillary refill takes less than 2 seconds.   Neurological:      General: No focal deficit present.      Mental Status: She is alert and oriented to person, place, and time.   Psychiatric:         Mood and Affect: Mood normal.         Behavior: Behavior normal.         Thought Content: Thought content normal.         Judgment: Judgment normal.         Procedures           ED Course  ED Course as of 10/23/21  1908   Sat Oct 23, 2021   1458 Labs and ct scans all reviewed by Dr. Molina. Reviewed case with Dr. Molina. Patient does have a uti which we will treat. She has appt with Dr. Galvez and Dr. Chaves on Wednesday. We recommend sitting and dangling prior to standing to avoid vasovagal syncope. Labs are at patient's baseline from review. She will need to return with any worsening sxs.  [TW]   1500 IMPRESSION:     1.  No major arterial occlusion or flow-limiting stenosis. Nonaneurysmal  abdominal aorta with heavy atherosclerotic calcification.  2.  2.6 cm indeterminate RIGHT adnexal lesion. Recommend correlation  with pelvic ultrasound.   [TW]   1500 IMPRESSION:     No evidence of pulmonary embolus. No acute findings.   [TW]   1501 IMPRESSION:     1.  No acute intracranial finding.  2.  Mild paranasal sinus mucosal thickening.  3.  Chronic microvascular ischemic white matter change.   [TW]      ED Course User Index  [TW] Bridgett Fagan, APRN                                           MDM  Number of Diagnoses or Management Options  Paroxysmal nocturnal hemoglobinuria (HCC): new and requires workup  Urinary tract infection without hematuria, site unspecified: new and requires workup  Vasovagal syncope: new and requires workup     Amount and/or Complexity of Data Reviewed  Clinical lab tests: ordered and reviewed  Tests in the radiology section of CPT®: ordered and reviewed  Decide to obtain previous medical records or to obtain history from someone other than the patient: yes  Discuss the patient with other providers: yes    Risk of Complications, Morbidity, and/or Mortality  Presenting problems: moderate  Diagnostic procedures: moderate  Management options: moderate    Patient Progress  Patient progress: improved      Final diagnoses:   Vasovagal syncope   Paroxysmal nocturnal hemoglobinuria (HCC)   Urinary tract infection without hematuria, site unspecified       ED Disposition  ED Disposition     ED Disposition  Condition Comment    Discharge Good           No follow-up provider specified.       Medication List      New Prescriptions    cefdinir 300 MG capsule  Commonly known as: OMNICEF  Take 1 capsule by mouth 2 (Two) Times a Day for 7 days.     ondansetron ODT 4 MG disintegrating tablet  Commonly known as: ZOFRAN-ODT  Place 1 tablet on the tongue Every 6 (Six) Hours As Needed for Nausea.           Where to Get Your Medications      These medications were sent to [facility] - [place], [place] - [address] - [phone]  - [phone]   [address] [facility] [address]    Phone: [phone]   · cefdinir 300 MG capsule  · ondansetron ODT 4 MG disintegrating tablet          Bridgett Fagan, APRN  10/23/21 4330

## 2021-10-23 NOTE — DISCHARGE INSTRUCTIONS
Sit and dangle prior to standing; maintain blood pressure diary twice daily once in am and once in pm being consistent with times for cardiologist to further evaluate    You have an adnexal lesion noted on ct scan which is usually a growth that occurs near uterus, ovaries, fallopian tubes, and connecting tissues. Typically benign noted on ct scan however we do recommend outpatient pelvic ultrasound for further evaluation. This may be ordered by your pcp.     F/u with specialist on Wednesday as scheduled.

## 2021-10-24 LAB
QT INTERVAL: 368 MS
QTC INTERVAL: 434 MS

## 2021-10-27 ENCOUNTER — TELEPHONE (OUTPATIENT)
Dept: ONCOLOGY | Facility: CLINIC | Age: 66
End: 2021-10-27

## 2021-10-27 ENCOUNTER — APPOINTMENT (OUTPATIENT)
Dept: CT IMAGING | Facility: HOSPITAL | Age: 66
End: 2021-10-27

## 2021-10-27 ENCOUNTER — LAB (OUTPATIENT)
Dept: LAB | Facility: HOSPITAL | Age: 66
End: 2021-10-27

## 2021-10-27 ENCOUNTER — OFFICE VISIT (OUTPATIENT)
Dept: ONCOLOGY | Facility: CLINIC | Age: 66
End: 2021-10-27

## 2021-10-27 ENCOUNTER — OFFICE VISIT (OUTPATIENT)
Dept: CARDIOLOGY | Facility: CLINIC | Age: 66
End: 2021-10-27

## 2021-10-27 VITALS
SYSTOLIC BLOOD PRESSURE: 126 MMHG | HEIGHT: 62 IN | TEMPERATURE: 97.1 F | DIASTOLIC BLOOD PRESSURE: 70 MMHG | RESPIRATION RATE: 16 BRPM | OXYGEN SATURATION: 97 % | BODY MASS INDEX: 25.78 KG/M2 | WEIGHT: 140.1 LBS | HEART RATE: 91 BPM

## 2021-10-27 VITALS
SYSTOLIC BLOOD PRESSURE: 132 MMHG | DIASTOLIC BLOOD PRESSURE: 69 MMHG | WEIGHT: 139 LBS | HEART RATE: 84 BPM | HEIGHT: 62 IN | BODY MASS INDEX: 25.58 KG/M2

## 2021-10-27 DIAGNOSIS — R06.09 DOE (DYSPNEA ON EXERTION): Primary | ICD-10-CM

## 2021-10-27 DIAGNOSIS — D59.5 PNH (PAROXYSMAL NOCTURNAL HEMOGLOBINURIA) (HCC): ICD-10-CM

## 2021-10-27 DIAGNOSIS — D59.5 PNH (PAROXYSMAL NOCTURNAL HEMOGLOBINURIA) (HCC): Primary | ICD-10-CM

## 2021-10-27 DIAGNOSIS — D61.818 PANCYTOPENIA (HCC): Primary | ICD-10-CM

## 2021-10-27 LAB
ALBUMIN SERPL-MCNC: 4.4 G/DL (ref 3.5–5.2)
ALBUMIN/GLOB SERPL: 1.5 G/DL
ALP SERPL-CCNC: 57 U/L (ref 39–117)
ALT SERPL W P-5'-P-CCNC: 33 U/L (ref 1–33)
ANION GAP SERPL CALCULATED.3IONS-SCNC: 11 MMOL/L (ref 5–15)
AST SERPL-CCNC: 20 U/L (ref 1–32)
BASOPHILS # BLD AUTO: 0.01 10*3/MM3 (ref 0–0.2)
BASOPHILS NFR BLD AUTO: 0.2 % (ref 0–1.5)
BILIRUB SERPL-MCNC: 1.1 MG/DL (ref 0–1.2)
BUN SERPL-MCNC: 9 MG/DL (ref 8–23)
BUN/CREAT SERPL: 17.6 (ref 7–25)
CALCIUM SPEC-SCNC: 9.2 MG/DL (ref 8.6–10.5)
CHLORIDE SERPL-SCNC: 104 MMOL/L (ref 98–107)
CO2 SERPL-SCNC: 26 MMOL/L (ref 22–29)
CREAT SERPL-MCNC: 0.51 MG/DL (ref 0.57–1)
D DIMER PPP FEU-MCNC: 1.47 MG/L (FEU) (ref 0–0.5)
DEPRECATED RDW RBC AUTO: 72.2 FL (ref 37–54)
EOSINOPHIL # BLD AUTO: 0.03 10*3/MM3 (ref 0–0.4)
EOSINOPHIL NFR BLD AUTO: 0.6 % (ref 0.3–6.2)
ERYTHROCYTE [DISTWIDTH] IN BLOOD BY AUTOMATED COUNT: 21.1 % (ref 12.3–15.4)
GFR SERPL CREATININE-BSD FRML MDRD: 121 ML/MIN/1.73
GLOBULIN UR ELPH-MCNC: 3 GM/DL
GLUCOSE SERPL-MCNC: 111 MG/DL (ref 65–99)
HAPTOGLOB SERPL-MCNC: <10 MG/DL (ref 30–200)
HCT VFR BLD AUTO: 24.5 % (ref 34–46.6)
HGB BLD-MCNC: 8.2 G/DL (ref 12–15.9)
HOLD SPECIMEN: NORMAL
LYMPHOCYTES # BLD AUTO: 2.68 10*3/MM3 (ref 0.7–3.1)
LYMPHOCYTES NFR BLD AUTO: 52.2 % (ref 19.6–45.3)
MCH RBC QN AUTO: 33.3 PG (ref 26.6–33)
MCHC RBC AUTO-ENTMCNC: 33.5 G/DL (ref 31.5–35.7)
MCV RBC AUTO: 99.6 FL (ref 79–97)
MONOCYTES # BLD AUTO: 0.32 10*3/MM3 (ref 0.1–0.9)
MONOCYTES NFR BLD AUTO: 6.2 % (ref 5–12)
NEUTROPHILS NFR BLD AUTO: 2.07 10*3/MM3 (ref 1.7–7)
NEUTROPHILS NFR BLD AUTO: 40.4 % (ref 42.7–76)
PLATELET # BLD AUTO: 12 10*3/MM3 (ref 140–450)
POTASSIUM SERPL-SCNC: 3.4 MMOL/L (ref 3.5–5.2)
PROT SERPL-MCNC: 7.4 G/DL (ref 6–8.5)
RBC # BLD AUTO: 2.46 10*6/MM3 (ref 3.77–5.28)
RETICS # AUTO: 0.1 10*6/MM3 (ref 0.02–0.13)
RETICS/RBC NFR AUTO: 3.95 % (ref 0.7–1.9)
SODIUM SERPL-SCNC: 141 MMOL/L (ref 136–145)
WBC # BLD AUTO: 5.13 10*3/MM3 (ref 3.4–10.8)

## 2021-10-27 PROCEDURE — 85045 AUTOMATED RETICULOCYTE COUNT: CPT

## 2021-10-27 PROCEDURE — 99204 OFFICE O/P NEW MOD 45 MIN: CPT | Performed by: INTERNAL MEDICINE

## 2021-10-27 PROCEDURE — 99214 OFFICE O/P EST MOD 30 MIN: CPT | Performed by: INTERNAL MEDICINE

## 2021-10-27 PROCEDURE — 93000 ELECTROCARDIOGRAM COMPLETE: CPT | Performed by: INTERNAL MEDICINE

## 2021-10-27 PROCEDURE — 80053 COMPREHEN METABOLIC PANEL: CPT

## 2021-10-27 PROCEDURE — 85025 COMPLETE CBC W/AUTO DIFF WBC: CPT

## 2021-10-27 PROCEDURE — 83010 ASSAY OF HAPTOGLOBIN QUANT: CPT

## 2021-10-27 PROCEDURE — 36415 COLL VENOUS BLD VENIPUNCTURE: CPT

## 2021-10-27 PROCEDURE — 85379 FIBRIN DEGRADATION QUANT: CPT

## 2021-10-27 RX ORDER — POTASSIUM CHLORIDE 1500 MG/1
20 TABLET, FILM COATED, EXTENDED RELEASE ORAL DAILY
Qty: 90 TABLET | Refills: 3 | Status: SHIPPED | OUTPATIENT
Start: 2021-10-27 | End: 2021-10-27

## 2021-10-27 RX ORDER — POTASSIUM CHLORIDE 20 MEQ/1
20 TABLET, EXTENDED RELEASE ORAL 2 TIMES DAILY
Qty: 90 TABLET | Refills: 0 | Status: SHIPPED | OUTPATIENT
Start: 2021-10-27 | End: 2021-12-16 | Stop reason: SDUPTHER

## 2021-10-27 RX ORDER — POTASSIUM CHLORIDE 1500 MG/1
20 TABLET, FILM COATED, EXTENDED RELEASE ORAL 2 TIMES DAILY
Qty: 90 TABLET | Refills: 1 | Status: SHIPPED | OUTPATIENT
Start: 2021-10-27 | End: 2021-10-27

## 2021-10-27 NOTE — TELEPHONE ENCOUNTER
CRITICAL LAB VALUE  Received call from Angie  Hematology dept with CRITICAL LAB VALUE;    PLT: 12  This information was given to Becki COTTON /Dr Galvez and also sent to Dr Galvez

## 2021-10-27 NOTE — PROGRESS NOTES
MGW ONC [facility] GROUP HEMATOLOGY & ONCOLOGY  [address]  [place] [address]  [phone]       10/27/2021     PROGRESS NOTE     PATIENT NAME: Ratna Cohen  YOB: 1955  66 y.o. MR: [id number]    PCP: Faith Alcaraz APRN    HPI:   Ms. Ratna Cohen went to the emergency room on 10/24/2021 complaining of acute dizziness and abdominal pain.  She had CT of the head, and CT chest/abdomen angiograms that were unremarkable, except for possible adnexal mass.  UA was abnormal.  She has been treated for UTI with Omnicef.  She is feeling better.    Since the visit last week, we discussed over the phone the results of the PNH profile on the peripheral blood that was consistent with PNH.  Since then, the patient and the daughter, who is RN, have had time to review the diagnosis and the recommended treatment.  The patient would like to initiate the treatment for PNH here in our office.  The treatment has been ordered.  Today we reviewed the anticipated treatment with Ultomiris including benefits and risks. We are anticipating starting Ultomiris on 11/8/2021.  She received meningococcal vaccination, first dose on 10/22/2021.     We reviewed the labs today.  We reviewed that the hemoglobin has declined to 8.2 today.  The platelets are 12.  We decided that because she is feeling better today, we will not transfuse today but we will reevaluate on Monday.  Although the platelets are significantly decreased, it is stable compared to the last several weeks so we decided to continue observation regarding that.  She was noted on last several labs to have hypokalemia.  She eats a banana every day and cannot eat more than that so we decided to try oral potassium by prescription.    Last week, Dr. Blanchard, hematologist from [facility] called.  We discussed the case over the phone.  He would like to follow the patient so the patient agreed that  she will be returning there in the next few days.  The patient previously already had an appointment to follow-up there but again contacted his office and will have the appointment anticipated there.  PAST HISTORY:     HEME/ONC HISTORY  Oncology/Hematology History Overview Note     HEME/ONC DX/RX/INVESTIGATIONS SUMMARY:   DX:   PNH (minimal neutropenia, severe anemia, severe thrombocytopenia).   Unremarkable BMBX on 10/4/2021. Anemic symptoms started in 2020, petechiae started in 6/2021.    10/21/2021, iron overload probably 2nd PRBCs.    10/23/2021, CT abd, undetermined 2.6 cm R adnexal lesion, needs further eval. when patient is more stable.    RX:   9/21/2021, 2U PRBCs and 2 platelet transfusions.  10/15/2021, 1U PRBC.  Prednisone: 10/15/2021, start 80 mg QD. 10/22/2021, DC prednisone.  10/22/2021, Meningococcus vaccines 1st dose (Bexsero and Menactra)  11/8/2021 (anticipated), Ultomiris, 1st dose (loading dose).  11/29/2021 (anticipated), Ultomiris Q8wks, C1.    OTHER INFO:   No prior hematological history or significant medical history until 9/21/2021.    Stress test was positive during admission at [facility] in 9/21/2021, cardiac cath not possible because of thrombocytopenia.  Patient never had chest pain, only dyspnea and weakness.    INVESTIGATIONS:   9/21/2021-9:48 AM, WBC 3.4 hemoglobin 7.7 .8 platelets 12 neutrophils 1.1 lymphocytes 1.8.  9/21/2021-17:20 PM, WBC 3.9 hemoglobin 8.3 .1 platelets 23.  9/22/2021, folic acid 25.9 ferritin 365 B12 368 HBsAg negative, HIV negative, hep C antibody negative reticulocyte 4.0%, PT 10.7, INR 1.0 PTT 25.3. Fecal occult blood negative.  9/22/2021, 1:37 AM, WBC 3.6 hemoglobin 7.0 .5 platelets 11 neutrophils 1.06 lymphocytes 2.19.  9/23/2021, WBC 4.0 hemoglobin 10.4 .1 platelets 12.  Neutrophils 1.38 lymphocytes 2.0. BUN 11 creatinine 0.47 calcium 8.5 bilirubin 0.7 ALT 31 AST 26 alk phos 68 albumin 3.7 total protein  7.1.  10/4/201, Diagnosis: BONE MARROW - PERIPHERAL BLOOD SMEAR, ASPIRATE SMEAR, PARTICLE PREPARATION, BIOPSY AND FLOW CYTOMETRY: NORMOCELLULAR MARROW WITH MATURING TRILINEAGE HEMATOPOIESIS, ERYTHROID ATYPIA, AND NO INCREASE IN BLASTS; SEE IMPRESSION.   10/4/2021, Myeloid NGS Panel RESULTS SUMMARY: (Final).Inconclusive: A variant of uncertain significance was detected in this patient's sample.   10/13/2021, EPO 1,966.  Haptoglobin < 8.  .  Reticulocyte 4%.  10/15/2021, WBC 3.71 hemoglobin 7.6 .3 platelets 11.  10/18/2021, MARTA negative.    10/22/2021, PNH profile:  Comment: Peripheral Blood:   Glycosylphosphatidylinositol (GPI) anchor deficient cells detected (4.48% of granulocytes, 5.93% of monocytes, 0.07% of red blood cells have features of type II red blood cells, 0.50% of red blood cells have features of type III red blood cells).  10/23/2021, CT chest angio:  No evidence of pulmonary embolus. No acute findings.    10/23/2021, CT abd angio:  1.  No major arterial occlusion or flow-limiting stenosis. Nonaneurysmal  abdominal aorta with heavy atherosclerotic calcification.  2.  2.6 cm indeterminate RIGHT adnexal lesion. Recommend correlation  with pelvic ultrasound.           MEDICAL HISTORY   has a past medical history of Acid reflux, Anemia, and PNH (paroxysmal nocturnal hemoglobinuria) (HCC).   HEALTH MAINTENANCE ITEMS  Health Maintenance Due   Topic Date Due   • COLORECTAL CANCER SCREENING  Never done   • ANNUAL PHYSICAL  Never done   • TDAP/TD VACCINES (1 - Tdap) Never done   • ZOSTER VACCINE (1 of 2) Never done   • INFLUENZA VACCINE  Never done   • HEPATITIS C SCREENING  Never done     <no information>  Last Completed Colonoscopy     This patient has no relevant Health Maintenance data.        Immunization History   Administered Date(s) Administered   • COVID-19 (MODERNA) 03/01/2021, 03/31/2021   • Hepatitis B Vaccine Adult IM 05/10/2007   • Meningococcal B,(Bexsero) 10/22/2021   •  "Meningococcal MCV4P (Menactra) 10/22/2021     Last Completed Mammogram          MAMMOGRAM (Every 2 Years) Next due on 9/1/2023 09/01/2021  Outside Procedure: HC MAMMOGRAM SCREENING BILAT DIGITAL W CAD              FAMILY HISTORY  Family History   Problem Relation Age of Onset   • Diabetes Mother    • Hypertension Mother    • Heart attack Mother    • Hypertension Father    • Heart attack Father        Cancer-related family history is not on file.   SURGICAL HISTORY   has a past surgical history that includes Appendectomy.  SOCIAL HISTORY  Social History     Socioeconomic History   • Marital status:    Tobacco Use   • Smoking status: Never Smoker   • Smokeless tobacco: Never Used   Vaping Use   • Vaping Use: Never used   Substance and Sexual Activity   • Alcohol use: Not Currently   • Drug use: Never   • Sexual activity: Yes     MEDICATIONS:     Current Outpatient Medications   Medication Instructions   • atorvastatin (LIPITOR) 80 mg, Oral, Every Night at Bedtime, TAKING A 1/4 OF TABLET ONCE A DAY   • cefdinir (OMNICEF) 300 mg, Oral, 2 Times Daily   • ondansetron ODT (ZOFRAN-ODT) 4 mg, Translingual, Every 6 Hours PRN   • pantoprazole (PROTONIX) 20 mg, Oral, Daily   • potassium chloride (K-DUR,KLOR-CON) 20 MEQ CR tablet 20 mEq, Oral, 2 Times Daily   • psyllium (KONSYL) 28.3 % pack pack Oral, Daily      ALLERGIES:   No Known Allergies  ROS:   Pertinent positive and negative findings as noted in HPI.   PHYSICAL EXAM:   /70   Pulse 91   Temp 97.1 °F (36.2 °C)   Resp 16   Ht 156.2 cm (61.5\")   Wt 63.5 kg (140 lb 1.6 oz)   SpO2 97%   Breastfeeding No   BMI 26.04 kg/m²  Body surface area is 1.63 meters squared.   Pain Score    10/27/21 1259   PainSc:   2     Alert, oriented x3, cooperative, not in distress.  HEENT: Normocephalic, wearing a facemask.  Lungs: No tachypnea.   Extremities: No ankle edema.  Neurologic: Moves all extremities.    INVESTIGATIONS/LABS:     Lab Results - Last 18 Months   Lab " Units 10/27/21  1248 10/23/21  1012 10/21/21  0800 10/18/21  1018 10/15/21  1015 10/13/21  1151 09/27/21  0942   WBC 10*3/mm3 5.13 9.69 8.71 6.72 3.71  --  5.24   HEMOGLOBIN g/dL 8.2* 8.9* 9.3* 8.5* 7.6*  --  9.3*   HEMATOCRIT % 24.5* 26.5* 26.4* 25.0* 22.4*  --  27.4*   MCV fL 99.6* 98.9* 98.1* 97.3* 102.3*  --  101.1*   PLATELETS 10*3/mm3 12* 13* 13* 13* 11*  --  18*   IMM GRAN % %  --   --   --   --  0.3  --   --    NEUTROS ABS 10*3/mm3 2.07 4.67 2.40 3.13 1.33* 2.43 3.09   LYMPHS ABS 10*3/mm3 2.68 4.05*  --  3.01 1.96 2.05  --    MONOS ABS 10*3/mm3 0.32 0.84  --  0.53 0.37 0.21*  --    EOS ABS 10*3/mm3 0.03 0.06  --  0.00 0.03 0.04  --    EOSINOPHIL ABS %  --   --   --   --   --  0.9  --    BASOS ABS 10*3/mm3 0.01 0.01  --  0.01 0.01  --   --    IMMATURE GRANS (ABS) 10*3/mm3  --   --   --   --  0.01  --   --    NRBC /100 WBC  --   --   --   --  0.5*  --   --    NEUTROPHIL % %  --   --  24.5*  --   --   --  56.0   MONOCYTES % %  --   --  4.1*  --   --  4.3 7.0   ANISOCYTOSIS   --  Slight/1+ Slight/1+  --   --  3+ Slight/1+       Lab Results - Last 18 Months   Lab Units 10/27/21  1248 10/23/21  1012 10/18/21  1018 09/27/21  0942   BUN mg/dL 9 12 10 10   CREATININE mg/dL 0.51* 0.49* 0.46* 0.44*   GLUCOSE mg/dL 111* 92 124* 107*   SODIUM mmol/L 141 141 140 141   POTASSIUM mmol/L 3.4* 3.1* 3.0* 3.3*   CO2 mmol/L 26.0 30.0* 28.0 27.0   CHLORIDE mmol/L 104 102 103 106   ANION GAP mmol/L 11.0 9.0 9.0 8.0   BUN / CREAT RATIO  17.6 24.5 21.7 22.7   CALCIUM mg/dL 9.2 8.6 9.0 9.0   EGFR IF NONAFRICN AM mL/min/1.73 121 127 137 144   ALK PHOS U/L 57 57 67 70   TOTAL PROTEIN g/dL 7.4 6.6 7.5 7.2   ALT (SGPT) U/L 33 52* 42* 18   AST (SGOT) U/L 20 18 26 22   BILIRUBIN mg/dL 1.1 1.2 0.8 0.7   ALBUMIN g/dL 4.40 4.00 4.50 4.20   GLOBULIN gm/dL 3.0 2.6 3.0 3.0       Lab Results - Last 18 Months   Lab Units 10/21/21  0800 10/13/21  1008 09/27/21  0942   LDH U/L 270* 244* 280*       Lab Results - Last 18 Months   Lab Units  10/18/21  1018 10/13/21  1151 10/13/21  1008 10/01/21  1116 09/27/21  0942   IRON mcg/dL 230*  --   --  163 141   TIBC mcg/dL 282*  --   --  222* 280*   IRON SATURATION % 82*  --   --  73 50   FERRITIN ng/mL 872.00*  --   --  418* 472.40*   VITAMIN B 12 pg/mL 665  --  753  --  534   FOLATE ng/mL 17.10  --  16.6  --  >20.00   TSH mcunit/mL  --  1.291  --   --   --        CT Head Without Contrast    Result Date: 10/23/2021   1.  No acute intracranial finding. 2.  Mild paranasal sinus mucosal thickening. 3.  Chronic microvascular ischemic white matter change. This report was finalized on 10/23/2021 11:54 by Dr. Justo Chaves MD.    CT Angiogram Abdomen Pelvis    Result Date: 10/23/2021   1.  No major arterial occlusion or flow-limiting stenosis. Nonaneurysmal abdominal aorta with heavy atherosclerotic calcification. 2.  2.6 cm indeterminate RIGHT adnexal lesion. Recommend correlation with pelvic ultrasound. This report was finalized on 10/23/2021 13:17 by Dr. Justo Chaves MD.    CT Angiogram Chest    Result Date: 10/23/2021   No evidence of pulmonary embolus. No acute findings. This report was finalized on 10/23/2021 13:12 by Dr. Justo Chaves MD.      ASSESSMENT:   PNH, so far treated with transfusions of PRBCs and platelets.  She took prednisone 80 mg daily for less than 1 week but I advised her to discontinue it on 10/22/2021 after the PNH profile was available.    We are anticipating starting Ultomiris in our office on the following schedule:    11/8/2021 (anticipated), Ultomiris, 1st dose (loading dose).   11/29/2021 (anticipated), Ultomiris Q8wks, C1.     PLAN/RECOMMENDATIONS:   VST 11/1/2021, labs 1h PTV.    DX/ORDERS:   Diagnoses and all orders for this visit:    1. PNH (paroxysmal nocturnal hemoglobinuria) (HCC) (Primary)  -     Cancel: CBC Auto Differential; Future  -     Comprehensive Metabolic Panel; Future  -     CBC Auto Differential; Future  -     Type & Screen; Future    Other orders  -      Discontinue: potassium chloride (K-DUR,KLOR-CON) 20 MEQ tablet controlled-release ER tablet; Take 1 tablet by mouth Daily for 90 days.  Dispense: 90 tablet; Refill: 3  -     Discontinue: potassium chloride (K-DUR,KLOR-CON) 20 MEQ tablet controlled-release ER tablet; Take 1 tablet by mouth 2 (Two) Times a Day for 90 days.  Dispense: 90 tablet; Refill: 1      Future Appointments   Date Time Provider Department Center   11/1/2021  7:45 AM [facility] CANCER CTR LAB [facility] CCLAB [facility]   11/1/2021  8:45 AM Devante Galvez MD MG[facility] ONC [facility]   11/1/2021 10:30 AM PAD STRESS LAB 1 [facility] CARDI [facility]   11/23/2021  1:00 PM CHAIR 15 [facility] OP INFU ONC [facility] OIONC [facility]   12/21/2021  1:00 PM CHAIR 13 [facility] OP INFU ONC [facility] OIONC [facility]      Devante Galvez MD  10/27/2021    cc:  Faith Alcaraz APRN

## 2021-10-27 NOTE — TELEPHONE ENCOUNTER
Caller: Noel Phan    Relationship: Self    Best call back number: [phone]    What was the call regarding: PHAN CALLED REGARDING HER POTASSIUM RX. SHE SAYS THE PHARMACY TOLD HER THAT THEY DID NOT RECEIVE ANYTHING.     Do you require a callback: YES

## 2021-10-27 NOTE — PROGRESS NOTES
"Subjective    Ratna Cohen is a 66 y.o. female. Referred by pcp for soa and Masha abn at [facility]    History of Present Illness     SOA:  She has noted this for 3+ months and was getting worse. Was found to have severe anemia and low plts. Was hospitalized at [facility] 9/21. Cardiology was consulted and ECHO was normal and MASHA showed \"possbile blockage at the bottom of my heart\". A heart cath was rec but then not after the blood disorder was discovered. She was placed on anti-anginal meds and high potent statin tx. Her soa improved with transfusion of blood. She got hypotensive and syncopal so had to stop the anti-anginal tx and has felt better since. On her own she has reduced her Atorvastatin from 80mg daily to 20mg daily. EKG today is nsr, poor-r and ananda and nsc.    PNH:  New dx and is under the care of Hematology here and at [facility]. Is preparing for new tx. Has very low plt ct.and has had high-grade anemia in the recent past.        The following portions of the patient's history were reviewed and updated as appropriate: allergies, current medications, past family history, past medical history, past social history, past surgical history and problem list.    Patient Active Problem List   Diagnosis   • Need for meningococcal vaccination   • PNH (paroxysmal nocturnal hemoglobinuria) (HCC)   • HENSLEY (dyspnea on exertion)       No Known Allergies    Family History   Problem Relation Age of Onset   • Diabetes Mother    • Hypertension Mother    • Heart attack Mother    • Hypertension Father    • Heart attack Father        Social History     Socioeconomic History   • Marital status:    Tobacco Use   • Smoking status: Never Smoker   • Smokeless tobacco: Never Used   Vaping Use   • Vaping Use: Never used   Substance and Sexual Activity   • Alcohol use: Not Currently   • Drug use: Never   • Sexual activity: Yes         Current Outpatient Medications:   •  atorvastatin (LIPITOR) 80 MG tablet, Take 80 mg by mouth every night at " "bedtime. TAKING A 1/4 OF TABLET ONCE A DAY, Disp: , Rfl:   •  cefdinir (OMNICEF) 300 MG capsule, Take 1 capsule by mouth 2 (Two) Times a Day for 7 days., Disp: 14 capsule, Rfl: 0  •  ondansetron ODT (ZOFRAN-ODT) 4 MG disintegrating tablet, Place 1 tablet on the tongue Every 6 (Six) Hours As Needed for Nausea., Disp: 12 tablet, Rfl: 0  •  pantoprazole (PROTONIX) 20 MG EC tablet, Take 20 mg by mouth Daily., Disp: , Rfl:   •  psyllium (KONSYL) 28.3 % pack pack, Take  by mouth Daily., Disp: , Rfl:     Past Surgical History:   Procedure Laterality Date   • APPENDECTOMY         Review of Systems   Constitutional: Positive for activity change, appetite change and fatigue. Negative for unexpected weight change.   Respiratory: Positive for shortness of breath.    Cardiovascular: Negative for chest pain, palpitations and leg swelling.   Gastrointestinal: Positive for abdominal pain. Negative for blood in stool.   Genitourinary: Negative for difficulty urinating and hematuria.       /69   Pulse 84   Ht 156.2 cm (61.5\")   Wt 63 kg (139 lb)   BMI 25.84 kg/m²   Procedures    Objective   Physical Exam  Constitutional:       Appearance: She is ill-appearing.   Cardiovascular:      Rate and Rhythm: Normal rate and regular rhythm.      Pulses: Normal pulses.      Heart sounds: Normal heart sounds. No murmur heard.  No friction rub. No gallop.    Pulmonary:      Effort: Pulmonary effort is normal.      Breath sounds: Normal breath sounds. No rales.   Abdominal:      General: Bowel sounds are normal.      Tenderness: There is no abdominal tenderness.   Musculoskeletal:      Right lower leg: No edema.      Left lower leg: No edema.   Skin:     Coloration: Skin is pale.   Neurological:      General: No focal deficit present.   Psychiatric:         Mood and Affect: Mood normal.         Assessment/Plan   Diagnoses and all orders for this visit:    1. HENSLEY (dyspnea on exertion) (Primary)  Comments:  doubt related to IHD - check " DSE  Orders:  -     ECG 12 Lead  -     Adult Stress Echo W/ Cont or Stress Agent if Necessary Per Protocol    2. PNH (paroxysmal nocturnal hemoglobinuria) (HCC)  Comments:  significant problems with plts and Hgb                 Return if symptoms worsen or fail to improve.  Orders Placed This Encounter   Procedures   • ECG 12 Lead     Order Specific Question:   Reason for Exam:     Answer:   SOB     Order Specific Question:   Release to patient     Answer:   Immediate   • Adult Stress Echo W/ Cont or Stress Agent if Necessary Per Protocol     Order Specific Question:   What stress agent will be used?     Answer:   Dobutamine     Order Specific Question:   Difficulty walking criteria?     Answer:   Musculoskeletal (hips, knees, feet, back, amputee)     Order Specific Question:   Reason for exam?     Answer:   Dyspnea

## 2021-10-28 ENCOUNTER — TELEPHONE (OUTPATIENT)
Dept: ONCOLOGY | Facility: CLINIC | Age: 66
End: 2021-10-28

## 2021-10-28 NOTE — TELEPHONE ENCOUNTER
Patient called stating she was very tired today and thinks she may need blood before the weekend. Per Dr. Galvez we will see her tomorrow in the office and if he thinks she needs a transfusion we will have her do one tomorrow. I have tentatively scheduled her for this to make sure they have a chair for her. Patient verbalized understanding.     RONALD

## 2021-10-29 ENCOUNTER — APPOINTMENT (OUTPATIENT)
Dept: ONCOLOGY | Facility: HOSPITAL | Age: 66
End: 2021-10-29

## 2021-10-29 ENCOUNTER — TELEPHONE (OUTPATIENT)
Dept: ONCOLOGY | Facility: CLINIC | Age: 66
End: 2021-10-29

## 2021-10-29 NOTE — TELEPHONE ENCOUNTER
Callie from [facility] sent me a message that patient's  is in the hospital, and that she'll keep Monday's appt. Down for lab and Dr. Galvze on Monday.

## 2021-11-01 ENCOUNTER — LAB (OUTPATIENT)
Dept: LAB | Facility: HOSPITAL | Age: 66
End: 2021-11-01

## 2021-11-01 ENCOUNTER — TELEPHONE (OUTPATIENT)
Dept: ONCOLOGY | Facility: CLINIC | Age: 66
End: 2021-11-01

## 2021-11-01 ENCOUNTER — INFUSION (OUTPATIENT)
Dept: ONCOLOGY | Facility: HOSPITAL | Age: 66
End: 2021-11-01

## 2021-11-01 ENCOUNTER — APPOINTMENT (OUTPATIENT)
Dept: CARDIOLOGY | Facility: HOSPITAL | Age: 66
End: 2021-11-01

## 2021-11-01 ENCOUNTER — OFFICE VISIT (OUTPATIENT)
Dept: ONCOLOGY | Facility: CLINIC | Age: 66
End: 2021-11-01

## 2021-11-01 VITALS
SYSTOLIC BLOOD PRESSURE: 134 MMHG | BODY MASS INDEX: 26.24 KG/M2 | HEART RATE: 101 BPM | RESPIRATION RATE: 18 BRPM | TEMPERATURE: 97.8 F | HEIGHT: 61 IN | DIASTOLIC BLOOD PRESSURE: 44 MMHG | OXYGEN SATURATION: 100 % | WEIGHT: 139 LBS

## 2021-11-01 VITALS
TEMPERATURE: 96.1 F | DIASTOLIC BLOOD PRESSURE: 68 MMHG | RESPIRATION RATE: 16 BRPM | WEIGHT: 141.6 LBS | SYSTOLIC BLOOD PRESSURE: 124 MMHG | BODY MASS INDEX: 26.06 KG/M2 | HEART RATE: 88 BPM | HEIGHT: 62 IN | OXYGEN SATURATION: 99 %

## 2021-11-01 DIAGNOSIS — D59.5 PNH (PAROXYSMAL NOCTURNAL HEMOGLOBINURIA) (HCC): ICD-10-CM

## 2021-11-01 DIAGNOSIS — D59.5 PNH (PAROXYSMAL NOCTURNAL HEMOGLOBINURIA) (HCC): Primary | ICD-10-CM

## 2021-11-01 DIAGNOSIS — D61.818 PANCYTOPENIA (HCC): Primary | ICD-10-CM

## 2021-11-01 LAB
ABO GROUP BLD: NORMAL
ALBUMIN SERPL-MCNC: 4.5 G/DL (ref 3.5–5.2)
ALBUMIN/GLOB SERPL: 1.7 G/DL
ALP SERPL-CCNC: 64 U/L (ref 39–117)
ALT SERPL W P-5'-P-CCNC: 27 U/L (ref 1–33)
ANION GAP SERPL CALCULATED.3IONS-SCNC: 9 MMOL/L (ref 5–15)
AST SERPL-CCNC: 21 U/L (ref 1–32)
BASOPHILS # BLD AUTO: 0.01 10*3/MM3 (ref 0–0.2)
BASOPHILS NFR BLD AUTO: 0.2 % (ref 0–1.5)
BILIRUB SERPL-MCNC: 0.7 MG/DL (ref 0–1.2)
BLD GP AB SCN SERPL QL: NEGATIVE
BUN SERPL-MCNC: 6 MG/DL (ref 8–23)
BUN/CREAT SERPL: 12.5 (ref 7–25)
CALCIUM SPEC-SCNC: 9.3 MG/DL (ref 8.6–10.5)
CHLORIDE SERPL-SCNC: 104 MMOL/L (ref 98–107)
CO2 SERPL-SCNC: 26 MMOL/L (ref 22–29)
CREAT SERPL-MCNC: 0.48 MG/DL (ref 0.57–1)
DEPRECATED RDW RBC AUTO: 72.7 FL (ref 37–54)
EOSINOPHIL # BLD AUTO: 0.05 10*3/MM3 (ref 0–0.4)
EOSINOPHIL NFR BLD AUTO: 1.1 % (ref 0.3–6.2)
ERYTHROCYTE [DISTWIDTH] IN BLOOD BY AUTOMATED COUNT: 21.2 % (ref 12.3–15.4)
GFR SERPL CREATININE-BSD FRML MDRD: 130 ML/MIN/1.73
GLOBULIN UR ELPH-MCNC: 2.6 GM/DL
GLUCOSE SERPL-MCNC: 119 MG/DL (ref 65–99)
HCT VFR BLD AUTO: 21.2 % (ref 34–46.6)
HGB BLD-MCNC: 7.4 G/DL (ref 12–15.9)
HOLD SPECIMEN: NORMAL
IMM GRANULOCYTES # BLD AUTO: 0.01 10*3/MM3 (ref 0–0.05)
IMM GRANULOCYTES NFR BLD AUTO: 0.2 % (ref 0–0.5)
LYMPHOCYTES # BLD AUTO: 2.14 10*3/MM3 (ref 0.7–3.1)
LYMPHOCYTES NFR BLD AUTO: 49.1 % (ref 19.6–45.3)
MCH RBC QN AUTO: 34.9 PG (ref 26.6–33)
MCHC RBC AUTO-ENTMCNC: 34.9 G/DL (ref 31.5–35.7)
MCV RBC AUTO: 100 FL (ref 79–97)
MONOCYTES # BLD AUTO: 0.36 10*3/MM3 (ref 0.1–0.9)
MONOCYTES NFR BLD AUTO: 8.3 % (ref 5–12)
NEUTROPHILS NFR BLD AUTO: 1.79 10*3/MM3 (ref 1.7–7)
NEUTROPHILS NFR BLD AUTO: 41.1 % (ref 42.7–76)
NRBC BLD AUTO-RTO: 0 /100 WBC (ref 0–0.2)
PLATELET # BLD AUTO: 10 10*3/MM3 (ref 140–450)
POTASSIUM SERPL-SCNC: 3.4 MMOL/L (ref 3.5–5.2)
PROT SERPL-MCNC: 7.1 G/DL (ref 6–8.5)
RBC # BLD AUTO: 2.12 10*6/MM3 (ref 3.77–5.28)
RH BLD: NEGATIVE
SODIUM SERPL-SCNC: 139 MMOL/L (ref 136–145)
T&S EXPIRATION DATE: NORMAL
WBC # BLD AUTO: 4.36 10*3/MM3 (ref 3.4–10.8)

## 2021-11-01 PROCEDURE — P9016 RBC LEUKOCYTES REDUCED: HCPCS

## 2021-11-01 PROCEDURE — 86900 BLOOD TYPING SEROLOGIC ABO: CPT

## 2021-11-01 PROCEDURE — 99214 OFFICE O/P EST MOD 30 MIN: CPT | Performed by: INTERNAL MEDICINE

## 2021-11-01 PROCEDURE — 36415 COLL VENOUS BLD VENIPUNCTURE: CPT

## 2021-11-01 PROCEDURE — 86901 BLOOD TYPING SEROLOGIC RH(D): CPT

## 2021-11-01 PROCEDURE — P9035 PLATELET PHERES LEUKOREDUCED: HCPCS

## 2021-11-01 PROCEDURE — 36430 TRANSFUSION BLD/BLD COMPNT: CPT

## 2021-11-01 PROCEDURE — P9100 PATHOGEN TEST FOR PLATELETS: HCPCS

## 2021-11-01 PROCEDURE — 86850 RBC ANTIBODY SCREEN: CPT

## 2021-11-01 PROCEDURE — 85025 COMPLETE CBC W/AUTO DIFF WBC: CPT

## 2021-11-01 PROCEDURE — 80053 COMPREHEN METABOLIC PANEL: CPT

## 2021-11-01 PROCEDURE — 86923 COMPATIBILITY TEST ELECTRIC: CPT

## 2021-11-01 RX ORDER — CIPROFLOXACIN 500 MG/1
500 TABLET, FILM COATED ORAL 2 TIMES DAILY
Qty: 60 TABLET | Refills: 0 | Status: SHIPPED | OUTPATIENT
Start: 2021-11-01 | End: 2021-12-09

## 2021-11-01 RX ORDER — SODIUM CHLORIDE 9 MG/ML
250 INJECTION, SOLUTION INTRAVENOUS AS NEEDED
Status: DISCONTINUED | OUTPATIENT
Start: 2021-11-01 | End: 2021-11-01 | Stop reason: HOSPADM

## 2021-11-01 RX ORDER — SODIUM CHLORIDE 9 MG/ML
250 INJECTION, SOLUTION INTRAVENOUS AS NEEDED
Status: CANCELLED | OUTPATIENT
Start: 2021-11-01

## 2021-11-01 RX ORDER — CIPROFLOXACIN 500 MG/1
500 TABLET, FILM COATED ORAL EVERY 12 HOURS SCHEDULED
Status: DISCONTINUED | OUTPATIENT
Start: 2021-11-08 | End: 2021-11-01

## 2021-11-01 NOTE — PROGRESS NOTES
[facility] HEMATOLOGY & ONCOLOGY  [address]  [place] [address]  [phone]       11/01/2021     PROGRESS NOTE     PATIENT NAME: Ratna Cohen  YOB: 1955  66 y.o. MR: [id number]    PCP: Faith Alcaraz APRN    HPI:   Ms. Ratna Cohen complains of mild fatigue.  She denies abnormal bleeding.  We reviewed the laboratory evaluation.  Because the hemoglobin is 7.4 today and declining, we decided to proceed with transfusion of 1 unit PRBC today.  The platelet dropped to 10 so we decided to transfuse platelets as well.    We reviewed the anticipated treatment with Ultomiris.  I gave the patient the 's brochure regarding Ultomiris and discussion about meningococcal vaccination.  She received the first dose of meningococcal vaccination on 10/22/2021.    We decided to proceed with the plans to receive Ultomiris and continue supportive transfusions as indicated.  PAST HISTORY:     HEME/ONC HISTORY  Oncology/Hematology History Overview Note     HEME/ONC DX/RX/INVESTIGATIONS SUMMARY:   DX:   PNH (minimal neutropenia, severe anemia, severe thrombocytopenia).   Unremarkable BMBX on 10/4/2021. Anemic symptoms started in 2020, petechiae started in 6/2021.    10/21/2021, iron overload probably 2nd PRBCs.    10/23/2021, CT abd, undetermined 2.6 cm R adnexal lesion, needs further eval. when patient is more stable.    RX:   9/21/2021, 2U PRBCs and 2 platelet transfusions.  10/15/2021, 1U PRBC.  Prednisone: 10/15/2021, start 80 mg QD. 10/22/2021, DC prednisone.  10/22/2021, Meningococcus vaccines 1st dose (Bexsero and Menactra)  11/8/2021 (anticipated), Ultomiris, 1st dose (loading dose).  11/29/2021 (anticipated), Ultomiris Q8wks, C1.    OTHER INFO:   No prior hematological history or significant medical history until 9/21/2021.    Stress test was positive during admission at [facility] in 9/21/2021,  cardiac cath not possible because of thrombocytopenia.  Patient never had chest pain, only dyspnea and weakness.    INVESTIGATIONS:   9/21/2021-9:48 AM, WBC 3.4 hemoglobin 7.7 .8 platelets 12 neutrophils 1.1 lymphocytes 1.8.  9/21/2021-17:20 PM, WBC 3.9 hemoglobin 8.3 .1 platelets 23.  9/22/2021, folic acid 25.9 ferritin 365 B12 368 HBsAg negative, HIV negative, hep C antibody negative reticulocyte 4.0%, PT 10.7, INR 1.0 PTT 25.3. Fecal occult blood negative.  9/22/2021, 1:37 AM, WBC 3.6 hemoglobin 7.0 .5 platelets 11 neutrophils 1.06 lymphocytes 2.19.  9/23/2021, WBC 4.0 hemoglobin 10.4 .1 platelets 12.  Neutrophils 1.38 lymphocytes 2.0. BUN 11 creatinine 0.47 calcium 8.5 bilirubin 0.7 ALT 31 AST 26 alk phos 68 albumin 3.7 total protein 7.1.  10/4/201, Diagnosis: BONE MARROW - PERIPHERAL BLOOD SMEAR, ASPIRATE SMEAR, PARTICLE PREPARATION, BIOPSY AND FLOW CYTOMETRY: NORMOCELLULAR MARROW WITH MATURING TRILINEAGE HEMATOPOIESIS, ERYTHROID ATYPIA, AND NO INCREASE IN BLASTS; SEE IMPRESSION.   10/4/2021, Myeloid NGS Panel RESULTS SUMMARY: (Final).Inconclusive: A variant of uncertain significance was detected in this patient's sample.   10/13/2021, EPO 1,966.  Haptoglobin < 8.  .  Reticulocyte 4%.  10/15/2021, WBC 3.71 hemoglobin 7.6 .3 platelets 11.  10/18/2021, MARTA negative.    10/22/2021, PNH profile:  Comment: Peripheral Blood:   Glycosylphosphatidylinositol (GPI) anchor deficient cells detected (4.48% of granulocytes, 5.93% of monocytes, 0.07% of red blood cells have features of type II red blood cells, 0.50% of red blood cells have features of type III red blood cells).  10/23/2021, CT chest angio:  No evidence of pulmonary embolus. No acute findings.    10/23/2021, CT abd angio:  1.  No major arterial occlusion or flow-limiting stenosis. Nonaneurysmal  abdominal aorta with heavy atherosclerotic calcification.  2.  2.6 cm indeterminate RIGHT adnexal lesion. Recommend  correlation  with pelvic ultrasound.           MEDICAL HISTORY   has a past medical history of Acid reflux, Anemia, and PNH (paroxysmal nocturnal hemoglobinuria) (HCC).   HEALTH MAINTENANCE ITEMS  Health Maintenance Due   Topic Date Due   • COLORECTAL CANCER SCREENING  Never done   • ANNUAL PHYSICAL  Never done   • TDAP/TD VACCINES (1 - Tdap) Never done   • ZOSTER VACCINE (1 of 2) Never done   • INFLUENZA VACCINE  Never done   • HEPATITIS C SCREENING  Never done     <no information>  Last Completed Colonoscopy     This patient has no relevant Health Maintenance data.        Immunization History   Administered Date(s) Administered   • COVID-19 (MODERNA) 03/01/2021, 03/31/2021   • Hepatitis B Vaccine Adult IM 05/10/2007   • Meningococcal B,(Bexsero) 10/22/2021   • Meningococcal MCV4P (Menactra) 10/22/2021     Last Completed Mammogram          MAMMOGRAM (Every 2 Years) Next due on 9/1/2023 09/01/2021  Outside Procedure: HC MAMMOGRAM SCREENING BILAT DIGITAL W CAD              FAMILY HISTORY  Family History   Problem Relation Age of Onset   • Diabetes Mother    • Hypertension Mother    • Heart attack Mother    • Hypertension Father    • Heart attack Father        Cancer-related family history is not on file.   SURGICAL HISTORY   has a past surgical history that includes Appendectomy.  SOCIAL HISTORY  Social History     Socioeconomic History   • Marital status:    Tobacco Use   • Smoking status: Never Smoker   • Smokeless tobacco: Never Used   Vaping Use   • Vaping Use: Never used   Substance and Sexual Activity   • Alcohol use: Not Currently   • Drug use: Never   • Sexual activity: Yes     MEDICATIONS:     Current Outpatient Medications   Medication Instructions   • atorvastatin (LIPITOR) 80 mg, Oral, Every Night at Bedtime, TAKING A 1/4 OF TABLET ONCE A DAY   • ondansetron ODT (ZOFRAN-ODT) 4 mg, Translingual, Every 6 Hours PRN   • pantoprazole (PROTONIX) 20 mg, Oral, Daily   • potassium chloride  "(K-DUR,KLOR-CON) 20 MEQ CR tablet 20 mEq, Oral, 2 Times Daily   • psyllium (KONSYL) 28.3 % pack pack Oral, Daily      ALLERGIES:   No Known Allergies  ROS:   Pertinent positive and negative findings as noted in HPI.   PHYSICAL EXAM:   /68   Pulse 88   Temp 96.1 °F (35.6 °C)   Resp 16   Ht 156.2 cm (61.5\")   Wt 64.2 kg (141 lb 9.6 oz)   SpO2 99%   Breastfeeding No   BMI 26.32 kg/m²  Body surface area is 1.64 meters squared.   Pain Score    11/01/21 0835   PainSc: 0-No pain     Alert, oriented x3, cooperative, not in distress.  HEENT: Normocephalic, wearing a facemask.  Lungs: No tachypnea.   Extremities: No ankle edema.  Neurologic: Moves all extremities.    INVESTIGATIONS/LABS:     Lab Results - Last 18 Months   Lab Units 11/01/21  0806 10/27/21  1248 10/23/21  1012 10/21/21  0800 10/18/21  1018 10/15/21  1015 10/13/21  1151 09/27/21  0942 09/27/21  0942   WBC 10*3/mm3 4.36 5.13 9.69 8.71 6.72 3.71  --    < > 5.24   HEMOGLOBIN g/dL 7.4* 8.2* 8.9* 9.3* 8.5* 7.6*  --    < > 9.3*   HEMATOCRIT % 21.2* 24.5* 26.5* 26.4* 25.0* 22.4*  --    < > 27.4*   MCV fL 100.0* 99.6* 98.9* 98.1* 97.3* 102.3*  --    < > 101.1*   PLATELETS 10*3/mm3 10* 12* 13* 13* 13* 11*  --    < > 18*   IMM GRAN % % 0.2  --   --   --   --  0.3  --   --   --    NEUTROS ABS 10*3/mm3 1.79 2.07 4.67 2.40 3.13 1.33* 2.43  --  3.09   LYMPHS ABS 10*3/mm3 2.14 2.68 4.05*  --  3.01 1.96 2.05  --   --    MONOS ABS 10*3/mm3 0.36 0.32 0.84  --  0.53 0.37 0.21*  --   --    EOS ABS 10*3/mm3 0.05 0.03 0.06  --  0.00 0.03 0.04  --   --    EOSINOPHIL ABS %  --   --   --   --   --   --  0.9  --   --    BASOS ABS 10*3/mm3 0.01 0.01 0.01  --  0.01 0.01  --   --   --    IMMATURE GRANS (ABS) 10*3/mm3 0.01  --   --   --   --  0.01  --   --   --    NRBC /100 WBC 0.0  --   --   --   --  0.5*  --   --   --    NEUTROPHIL % %  --   --   --  24.5*  --   --   --   --  56.0   MONOCYTES % %  --   --   --  4.1*  --   --  4.3  --  7.0   ANISOCYTOSIS   --   --  Slight/1+ " Slight/1+  --   --  3+  --  Slight/1+    < > = values in this interval not displayed.       Lab Results - Last 18 Months   Lab Units 11/01/21  0806 10/27/21  1248 10/23/21  1012 10/18/21  1018 09/27/21  0942   BUN mg/dL 6* 9 12 10 10   CREATININE mg/dL 0.48* 0.51* 0.49* 0.46* 0.44*   GLUCOSE mg/dL 119* 111* 92 124* 107*   SODIUM mmol/L 139 141 141 140 141   POTASSIUM mmol/L 3.4* 3.4* 3.1* 3.0* 3.3*   CO2 mmol/L 26.0 26.0 30.0* 28.0 27.0   CHLORIDE mmol/L 104 104 102 103 106   ANION GAP mmol/L 9.0 11.0 9.0 9.0 8.0   BUN / CREAT RATIO  12.5 17.6 24.5 21.7 22.7   CALCIUM mg/dL 9.3 9.2 8.6 9.0 9.0   EGFR IF NONAFRICN AM mL/min/1.73 130 121 127 137 144   ALK PHOS U/L 64 57 57 67 70   TOTAL PROTEIN g/dL 7.1 7.4 6.6 7.5 7.2   ALT (SGPT) U/L 27 33 52* 42* 18   AST (SGOT) U/L 21 20 18 26 22   BILIRUBIN mg/dL 0.7 1.1 1.2 0.8 0.7   ALBUMIN g/dL 4.50 4.40 4.00 4.50 4.20   GLOBULIN gm/dL 2.6 3.0 2.6 3.0 3.0       Lab Results - Last 18 Months   Lab Units 10/21/21  0800 10/13/21  1008 09/27/21  0942   LDH U/L 270* 244* 280*       Lab Results - Last 18 Months   Lab Units 10/18/21  1018 10/13/21  1151 10/13/21  1008 10/01/21  1116 09/27/21  0942   IRON mcg/dL 230*  --   --  163 141   TIBC mcg/dL 282*  --   --  222* 280*   IRON SATURATION % 82*  --   --  73 50   FERRITIN ng/mL 872.00*  --   --  418* 472.40*   VITAMIN B 12 pg/mL 665  --  753  --  534   FOLATE ng/mL 17.10  --  16.6  --  >20.00   TSH mcunit/mL  --  1.291  --   --   --        CT Head Without Contrast    Result Date: 10/23/2021   1.  No acute intracranial finding. 2.  Mild paranasal sinus mucosal thickening. 3.  Chronic microvascular ischemic white matter change. This report was finalized on 10/23/2021 11:54 by Dr. Justo Chaves MD.    CT Angiogram Abdomen Pelvis    Result Date: 10/23/2021   1.  No major arterial occlusion or flow-limiting stenosis. Nonaneurysmal abdominal aorta with heavy atherosclerotic calcification. 2.  2.6 cm indeterminate RIGHT adnexal lesion.  Recommend correlation with pelvic ultrasound. This report was finalized on 10/23/2021 13:17 by Dr. Justo Chaves MD.    CT Angiogram Chest    Result Date: 10/23/2021   No evidence of pulmonary embolus. No acute findings. This report was finalized on 10/23/2021 13:12 by Dr. Justo Chaves MD.      ASSESSMENT:   PNH, treated so far with supportive transfusions and a short course of prednisone.    The patient received the first vaccination for meningococcus, summarized above.  I advised the patient to start Cipro for 14 days, prophylactically, the same day she starts Ultomiris.    Severe anemia and thrombocytopenia.  PLAN/RECOMMENDATIONS:   Transfuse platelets today (one unit of single donor or 6 units of random donor).     Transfuse one unit of PRBC.    VST 11/5/2021, labs 1h PTV.    DX/ORDERS:   Diagnoses and all orders for this visit:    1. PNH (paroxysmal nocturnal hemoglobinuria) (HCC) (Primary)  -     CBC Auto Differential; Future  -     Comprehensive Metabolic Panel; Future    Other orders  -     ciprofloxacin (CIPRO) tablet 500 mg        Future Appointments   Date Time Provider Department Center   11/1/2021 10:30 AM [facility] STRESS LAB 1  [facility] CARDI [facility]   11/5/2021  9:00 AM  [facility] CANCER CTR LAB  [facility] CCLAB [facility]   11/5/2021 10:00 AM Devante Galvez MD [facility]   11/23/2021  1:00 PM CHAIR 15  [facility] OP INFU ONC  [facility] OIONC [facility]   12/21/2021  1:00 PM CHAIR 13  [facility] OP INFU ONC  [facility] OIONC PAD        Devante Galvez MD  11/1/2021    cc:  Faith Alcaraz APRN

## 2021-11-01 NOTE — TELEPHONE ENCOUNTER
Provider: DR HONEYCUTT    Caller: PHAN    Relationship to Patient: SELF    Pharmacy: [facility]    Phone Number: [phone]    Reason for Call: PHAN IS CALLING BECAUSE SHE HAD AN APPOINTMENT TODAY AND THE PHARMACY STILL DOES NOT HAVE THE MEDICATION FOR CIPRO THAT DR HONEYCUTT WAS SUPPOSE TO SEND IN.    PLEASE CALL THE RX TO PHARMACY

## 2021-11-02 LAB
BH BB BLOOD EXPIRATION DATE: NORMAL
BH BB BLOOD EXPIRATION DATE: NORMAL
BH BB BLOOD TYPE BARCODE: 600
BH BB BLOOD TYPE BARCODE: 6200
BH BB DISPENSE STATUS: NORMAL
BH BB DISPENSE STATUS: NORMAL
BH BB PRODUCT CODE: NORMAL
BH BB PRODUCT CODE: NORMAL
BH BB UNIT NUMBER: NORMAL
BH BB UNIT NUMBER: NORMAL
CROSSMATCH INTERPRETATION: NORMAL
UNIT  ABO: NORMAL
UNIT  ABO: NORMAL
UNIT  RH: NORMAL
UNIT  RH: NORMAL

## 2021-11-03 ENCOUNTER — DOCUMENTATION (OUTPATIENT)
Dept: PHARMACY | Facility: HOSPITAL | Age: 66
End: 2021-11-03

## 2021-11-03 ENCOUNTER — HOSPITAL ENCOUNTER (OUTPATIENT)
Dept: CARDIOLOGY | Facility: HOSPITAL | Age: 66
Discharge: HOME | End: 2021-11-03
Admitting: INTERNAL MEDICINE

## 2021-11-03 ENCOUNTER — TELEPHONE (OUTPATIENT)
Dept: ONCOLOGY | Facility: CLINIC | Age: 66
End: 2021-11-03

## 2021-11-03 VITALS
DIASTOLIC BLOOD PRESSURE: 68 MMHG | HEIGHT: 62 IN | WEIGHT: 139 LBS | BODY MASS INDEX: 25.58 KG/M2 | HEART RATE: 92 BPM | SYSTOLIC BLOOD PRESSURE: 153 MMHG

## 2021-11-03 DIAGNOSIS — D59.5 PNH (PAROXYSMAL NOCTURNAL HEMOGLOBINURIA) (HCC): Primary | ICD-10-CM

## 2021-11-03 LAB
BH CV STRESS BP STAGE 1: NORMAL
BH CV STRESS BP STAGE 2: NORMAL
BH CV STRESS DOSE DOBUTAMINE STAGE 1: 10
BH CV STRESS DOSE DOBUTAMINE STAGE 2: 20
BH CV STRESS DURATION MIN STAGE 1: 3
BH CV STRESS DURATION MIN STAGE 2: 3
BH CV STRESS DURATION SEC STAGE 1: 0
BH CV STRESS DURATION SEC STAGE 2: 42
BH CV STRESS HR STAGE 1: 108
BH CV STRESS HR STAGE 2: 142
BH CV STRESS PROTOCOL 1: NORMAL
BH CV STRESS RECOVERY BP: NORMAL MMHG
BH CV STRESS RECOVERY HR: 103 BPM
BH CV STRESS STAGE 1: 1
BH CV STRESS STAGE 2: 2
MAXIMAL PREDICTED HEART RATE: 156 BPM
PERCENT MAX PREDICTED HR: 91.03 %
STRESS BASELINE BP: NORMAL MMHG
STRESS BASELINE HR: 92 BPM
STRESS PERCENT HR: 107 %
STRESS POST EXERCISE DUR MIN: 6 MIN
STRESS POST EXERCISE DUR SEC: 42 SEC
STRESS POST PEAK BP: NORMAL MMHG
STRESS POST PEAK HR: 142 BPM
STRESS TARGET HR: 133 BPM

## 2021-11-03 PROCEDURE — 93350 STRESS TTE ONLY: CPT | Performed by: INTERNAL MEDICINE

## 2021-11-03 PROCEDURE — 93352 ADMIN ECG CONTRAST AGENT: CPT | Performed by: INTERNAL MEDICINE

## 2021-11-03 PROCEDURE — 93350 STRESS TTE ONLY: CPT

## 2021-11-03 PROCEDURE — 93018 CV STRESS TEST I&R ONLY: CPT | Performed by: INTERNAL MEDICINE

## 2021-11-03 PROCEDURE — 0 DOBUTAMINE PER 250 MG: Performed by: INTERNAL MEDICINE

## 2021-11-03 PROCEDURE — 93017 CV STRESS TEST TRACING ONLY: CPT

## 2021-11-03 PROCEDURE — 25010000002 PERFLUTREN 6.52 MG/ML SUSPENSION: Performed by: INTERNAL MEDICINE

## 2021-11-03 RX ORDER — SODIUM CHLORIDE 9 MG/ML
250 INJECTION, SOLUTION INTRAVENOUS ONCE
Status: CANCELLED | OUTPATIENT
Start: 2021-11-08

## 2021-11-03 RX ORDER — DOBUTAMINE HYDROCHLORIDE 100 MG/100ML
10 INJECTION INTRAVENOUS CONTINUOUS
Status: DISCONTINUED | OUTPATIENT
Start: 2021-11-03 | End: 2021-11-04 | Stop reason: HOSPADM

## 2021-11-03 RX ADMIN — PERFLUTREN 8.48 MG: 6.52 INJECTION, SUSPENSION INTRAVENOUS at 10:24

## 2021-11-03 RX ADMIN — Medication 10 MCG/KG/MIN: at 10:45

## 2021-11-03 NOTE — TELEPHONE ENCOUNTER
PATIENT CALLED STATING THAT SHE  HAD MISSED A CALL FROM OUR OFFICE.  NO VM PER PATIENT AND NO NOTES IN CHART.  SHE WILL WAIT TO RECEIVE CALL BACK.

## 2021-11-04 ENCOUNTER — TELEPHONE (OUTPATIENT)
Dept: CARDIOLOGY | Facility: CLINIC | Age: 66
End: 2021-11-04

## 2021-11-04 NOTE — TELEPHONE ENCOUNTER
----- Message from Armando Chaves MD sent at 11/3/2021 12:21 PM CDT -----  NO SIGNS OF A BLOCKAGE ON RECENT STRESS TEST

## 2021-11-05 ENCOUNTER — LAB (OUTPATIENT)
Dept: LAB | Facility: HOSPITAL | Age: 66
End: 2021-11-05

## 2021-11-05 ENCOUNTER — OFFICE VISIT (OUTPATIENT)
Dept: ONCOLOGY | Facility: CLINIC | Age: 66
End: 2021-11-05

## 2021-11-05 ENCOUNTER — TELEPHONE (OUTPATIENT)
Dept: ONCOLOGY | Facility: CLINIC | Age: 66
End: 2021-11-05

## 2021-11-05 ENCOUNTER — INFUSION (OUTPATIENT)
Dept: ONCOLOGY | Facility: HOSPITAL | Age: 66
End: 2021-11-05

## 2021-11-05 VITALS
DIASTOLIC BLOOD PRESSURE: 50 MMHG | HEART RATE: 116 BPM | OXYGEN SATURATION: 98 % | HEIGHT: 61 IN | BODY MASS INDEX: 26.43 KG/M2 | RESPIRATION RATE: 18 BRPM | WEIGHT: 140 LBS | SYSTOLIC BLOOD PRESSURE: 135 MMHG | TEMPERATURE: 98.9 F

## 2021-11-05 VITALS
SYSTOLIC BLOOD PRESSURE: 150 MMHG | RESPIRATION RATE: 16 BRPM | WEIGHT: 141.7 LBS | TEMPERATURE: 99.5 F | DIASTOLIC BLOOD PRESSURE: 70 MMHG | HEART RATE: 88 BPM | HEIGHT: 62 IN | OXYGEN SATURATION: 98 % | BODY MASS INDEX: 26.07 KG/M2

## 2021-11-05 DIAGNOSIS — T80.92XA BLOOD TRANSFUSION REACTION, INITIAL ENCOUNTER: Primary | ICD-10-CM

## 2021-11-05 DIAGNOSIS — D59.5 PNH (PAROXYSMAL NOCTURNAL HEMOGLOBINURIA) (HCC): Primary | ICD-10-CM

## 2021-11-05 LAB
ABO GROUP BLD: NORMAL
ALBUMIN SERPL-MCNC: 4.3 G/DL (ref 3.5–5.2)
ALBUMIN/GLOB SERPL: 1.5 G/DL
ALP SERPL-CCNC: 70 U/L (ref 39–117)
ALT SERPL W P-5'-P-CCNC: 29 U/L (ref 1–33)
ANION GAP SERPL CALCULATED.3IONS-SCNC: 10 MMOL/L (ref 5–15)
AST SERPL-CCNC: 23 U/L (ref 1–32)
BASOPHILS # BLD AUTO: 0 10*3/MM3 (ref 0–0.2)
BASOPHILS NFR BLD AUTO: 0 % (ref 0–1.5)
BILIRUB SERPL-MCNC: 0.7 MG/DL (ref 0–1.2)
BLD GP AB SCN SERPL QL: NEGATIVE
BUN SERPL-MCNC: 7 MG/DL (ref 8–23)
BUN/CREAT SERPL: 12.7 (ref 7–25)
CALCIUM SPEC-SCNC: 9 MG/DL (ref 8.6–10.5)
CHLORIDE SERPL-SCNC: 103 MMOL/L (ref 98–107)
CO2 SERPL-SCNC: 26 MMOL/L (ref 22–29)
CREAT SERPL-MCNC: 0.55 MG/DL (ref 0.57–1)
DEPRECATED RDW RBC AUTO: 63.1 FL (ref 37–54)
EOSINOPHIL # BLD AUTO: 0.06 10*3/MM3 (ref 0–0.4)
EOSINOPHIL NFR BLD AUTO: 1.8 % (ref 0.3–6.2)
ERYTHROCYTE [DISTWIDTH] IN BLOOD BY AUTOMATED COUNT: 19.8 % (ref 12.3–15.4)
GFR SERPL CREATININE-BSD FRML MDRD: 111 ML/MIN/1.73
GLOBULIN UR ELPH-MCNC: 2.9 GM/DL
GLUCOSE SERPL-MCNC: 103 MG/DL (ref 65–99)
HCT VFR BLD AUTO: 24.1 % (ref 34–46.6)
HGB BLD-MCNC: 8.1 G/DL (ref 12–15.9)
HOLD SPECIMEN: NORMAL
LYMPHOCYTES # BLD AUTO: 1.81 10*3/MM3 (ref 0.7–3.1)
LYMPHOCYTES NFR BLD AUTO: 53.2 % (ref 19.6–45.3)
MCH RBC QN AUTO: 32.1 PG (ref 26.6–33)
MCHC RBC AUTO-ENTMCNC: 33.6 G/DL (ref 31.5–35.7)
MCV RBC AUTO: 95.6 FL (ref 79–97)
MONOCYTES # BLD AUTO: 0.44 10*3/MM3 (ref 0.1–0.9)
MONOCYTES NFR BLD AUTO: 12.9 % (ref 5–12)
NEUTROPHILS NFR BLD AUTO: 1.09 10*3/MM3 (ref 1.7–7)
NEUTROPHILS NFR BLD AUTO: 32.1 % (ref 42.7–76)
PLATELET # BLD AUTO: 7 10*3/MM3 (ref 140–450)
POTASSIUM SERPL-SCNC: 3.9 MMOL/L (ref 3.5–5.2)
PROT SERPL-MCNC: 7.2 G/DL (ref 6–8.5)
RBC # BLD AUTO: 2.52 10*6/MM3 (ref 3.77–5.28)
RH BLD: NEGATIVE
SODIUM SERPL-SCNC: 139 MMOL/L (ref 136–145)
T&S EXPIRATION DATE: NORMAL
TRANSFUSION REACTION INTERPRETATION 1: NORMAL
WBC # BLD AUTO: 3.4 10*3/MM3 (ref 3.4–10.8)

## 2021-11-05 PROCEDURE — P9037 PLATE PHERES LEUKOREDU IRRAD: HCPCS

## 2021-11-05 PROCEDURE — 80053 COMPREHEN METABOLIC PANEL: CPT

## 2021-11-05 PROCEDURE — 25010000002 DIPHENHYDRAMINE PER 50 MG: Performed by: INTERNAL MEDICINE

## 2021-11-05 PROCEDURE — 86900 BLOOD TYPING SEROLOGIC ABO: CPT

## 2021-11-05 PROCEDURE — 96374 THER/PROPH/DIAG INJ IV PUSH: CPT

## 2021-11-05 PROCEDURE — P9035 PLATELET PHERES LEUKOREDUCED: HCPCS

## 2021-11-05 PROCEDURE — 86900 BLOOD TYPING SEROLOGIC ABO: CPT | Performed by: INTERNAL MEDICINE

## 2021-11-05 PROCEDURE — 36415 COLL VENOUS BLD VENIPUNCTURE: CPT | Performed by: INTERNAL MEDICINE

## 2021-11-05 PROCEDURE — 86901 BLOOD TYPING SEROLOGIC RH(D): CPT

## 2021-11-05 PROCEDURE — 86850 RBC ANTIBODY SCREEN: CPT | Performed by: INTERNAL MEDICINE

## 2021-11-05 PROCEDURE — 99214 OFFICE O/P EST MOD 30 MIN: CPT | Performed by: INTERNAL MEDICINE

## 2021-11-05 PROCEDURE — 96375 TX/PRO/DX INJ NEW DRUG ADDON: CPT

## 2021-11-05 PROCEDURE — P9100 PATHOGEN TEST FOR PLATELETS: HCPCS

## 2021-11-05 PROCEDURE — 85025 COMPLETE CBC W/AUTO DIFF WBC: CPT

## 2021-11-05 PROCEDURE — 25010000002 HYDROCORTISONE SODIUM SUCCINATE 100 MG RECONSTITUTED SOLUTION: Performed by: INTERNAL MEDICINE

## 2021-11-05 PROCEDURE — 36430 TRANSFUSION BLD/BLD COMPNT: CPT

## 2021-11-05 PROCEDURE — 86901 BLOOD TYPING SEROLOGIC RH(D): CPT | Performed by: INTERNAL MEDICINE

## 2021-11-05 PROCEDURE — 86880 COOMBS TEST DIRECT: CPT

## 2021-11-05 RX ORDER — SODIUM CHLORIDE 9 MG/ML
250 INJECTION, SOLUTION INTRAVENOUS AS NEEDED
Status: CANCELLED | OUTPATIENT
Start: 2021-11-05

## 2021-11-05 RX ORDER — DIPHENHYDRAMINE HYDROCHLORIDE 50 MG/ML
25 INJECTION INTRAMUSCULAR; INTRAVENOUS ONCE
Status: CANCELLED | OUTPATIENT
Start: 2021-11-05 | End: 2021-11-05

## 2021-11-05 RX ORDER — SODIUM CHLORIDE 9 MG/ML
250 INJECTION, SOLUTION INTRAVENOUS AS NEEDED
Status: DISCONTINUED | OUTPATIENT
Start: 2021-11-05 | End: 2021-11-05 | Stop reason: HOSPADM

## 2021-11-05 RX ORDER — ACETAMINOPHEN 325 MG/1
650 TABLET ORAL ONCE
Status: COMPLETED | OUTPATIENT
Start: 2021-11-05 | End: 2021-11-05

## 2021-11-05 RX ORDER — DIPHENHYDRAMINE HYDROCHLORIDE 50 MG/ML
25 INJECTION INTRAMUSCULAR; INTRAVENOUS ONCE
Status: COMPLETED | OUTPATIENT
Start: 2021-11-05 | End: 2021-11-05

## 2021-11-05 RX ORDER — ACETAMINOPHEN 325 MG/1
650 TABLET ORAL ONCE
Status: CANCELLED
Start: 2021-11-05 | End: 2021-11-05

## 2021-11-05 RX ADMIN — FAMOTIDINE 20 MG: 10 INJECTION INTRAVENOUS at 13:00

## 2021-11-05 RX ADMIN — SODIUM CHLORIDE 250 ML: 9 INJECTION, SOLUTION INTRAVENOUS at 11:20

## 2021-11-05 RX ADMIN — ACETAMINOPHEN 650 MG: 325 TABLET, FILM COATED ORAL at 13:00

## 2021-11-05 RX ADMIN — DIPHENHYDRAMINE HYDROCHLORIDE 25 MG: 50 INJECTION, SOLUTION INTRAMUSCULAR; INTRAVENOUS at 13:02

## 2021-11-05 RX ADMIN — HYDROCORTISONE SODIUM SUCCINATE 100 MG: 100 INJECTION, POWDER, FOR SOLUTION INTRAMUSCULAR; INTRAVENOUS at 13:04

## 2021-11-05 NOTE — PROGRESS NOTES
1250 Called Dr. Galvez office and spoke with Donna PASTRANA to report stopped platelets related to patient with chills needing rescue drugs and orders. Lisa Hamilton RN

## 2021-11-05 NOTE — TELEPHONE ENCOUNTER
TRANSFUSION REACTION  12:50 pm: received call from Nurse [facility]. She calls to report Transfusion reaction on patient Ratna Cohen.   She reports patient has developed chills and rigors while receiving her PLT Transfusion.  1. Transfusion was stopped  2. Vitals are being taken  3. Request order for Emergency Medications  4. Monitor patient at this time.    12:51 PM: Relayed this information to Dr Galvez, he recommends Emergency Medications.   1. Tylenol 650 mg once  2. Pepcid 20 mg IV  3. Benadryl 25 mg IV  4. Solu-cortef 100 mg IV    Emergency Medications were ordered for patient  12:56 pm: call back to check on patient, Nurse reports patient is stable at this time and patient says she does feel a little better but continues to have chills off and on. Vitals reported:  Pulse: 114  B/P: 150/65  No sob at this time  Nurse notified Dr Galvez will be down to [facility] to check on patient  Chair #[id number].       1:15 pm: Update  Dr Galvez visited [facility] in the [facility], he reports patient is stable at this time and being monitored. Transfusion was almost complete of second unit of PLT's when she developed chills and rigors.   Transfusion was stopped and will not resume for infusion of remaining product today.   Patient continues to be monitored if she remains stable to be d/c home per Dr Galvez instruction to Nurse.

## 2021-11-05 NOTE — PROGRESS NOTES
[facility] ON[facility] HEMATOLOGY & ONCOLOGY  [address]  [place] [address]  [phone]       11/05/2021     PROGRESS NOTE     PATIENT NAME: Ratna Cohen  YOB: 1955  66 y.o. MR: [id number]    PCP: Faith Alcaraz APRN    HPI:   Ms. Ratna Cohen is feeling well today.  We reviewed the laboratory data.  Unfortunately the platelet count decline further.  It was 7 today.  The patient denies evidence of bleeding.  She denies epistaxis.  We also reviewed that she is anemic but she does not appear to have significant anemic symptoms so we decided to transfuse only platelets today.    While the patient was receiving platelets today, she completed 1 single unit donor bag without problems.  Unfortunately right at the end of the second single donor unit she developed chills, no fever.  I saw her downstairs at the infusion center.  She was treated with Benadryl and steroids and improved and was discharged home.  PAST HISTORY:     HEME/ONC HISTORY  Oncology/Hematology History Overview Note     HEME/ONC DX/RX/INVESTIGATIONS SUMMARY:   DX:   PNH (minimal neutropenia, severe anemia, severe thrombocytopenia).   Unremarkable BMBX on 10/4/2021. Anemic symptoms started in 2020, petechiae started in 6/2021.    10/21/2021, iron overload probably 2nd PRBCs.    10/23/2021, CT abd, undetermined 2.6 cm R adnexal lesion, needs further eval. when patient is more stable.    RX:   9/21/2021, 2U PRBCs and 2 platelet transfusions.  10/15/2021, 1U PRBC.  Prednisone: 10/15/2021, start 80 mg QD. 10/22/2021, DC prednisone.  10/22/2021, Meningococcus vaccines 1st dose (Bexsero and Menactra)  11/8/2021 (anticipated), Ultomiris, 1st dose (loading dose).  11/29/2021 (anticipated), Ultomiris Q8wks, C1.    OTHER INFO:   No prior hematological history or significant medical history until 9/21/2021.    [facility], cardiac cath not possible because of thrombocytopenia.  Patient never had chest pain, only dyspnea and weakness.    INVESTIGATIONS:   9/21/2021-9:48 AM, WBC 3.4 hemoglobin 7.7 .8 platelets 12 neutrophils 1.1 lymphocytes 1.8.  9/21/2021-17:20 PM, WBC 3.9 hemoglobin 8.3 .1 platelets 23.  9/22/2021, folic acid 25.9 ferritin 365 B12 368 HBsAg negative, HIV negative, hep C antibody negative reticulocyte 4.0%, PT 10.7, INR 1.0 PTT 25.3. Fecal occult blood negative.  9/22/2021, 1:37 AM, WBC 3.6 hemoglobin 7.0 .5 platelets 11 neutrophils 1.06 lymphocytes 2.19.  9/23/2021, WBC 4.0 hemoglobin 10.4 .1 platelets 12.  Neutrophils 1.38 lymphocytes 2.0. BUN 11 creatinine 0.47 calcium 8.5 bilirubin 0.7 ALT 31 AST 26 alk phos 68 albumin 3.7 total protein 7.1.  10/4/201, Diagnosis: BONE MARROW - PERIPHERAL BLOOD SMEAR, ASPIRATE SMEAR, PARTICLE PREPARATION, BIOPSY AND FLOW CYTOMETRY: NORMOCELLULAR MARROW WITH MATURING TRILINEAGE HEMATOPOIESIS, ERYTHROID ATYPIA, AND NO INCREASE IN BLASTS; SEE IMPRESSION.   10/4/2021, Myeloid NGS Panel RESULTS SUMMARY: (Final).Inconclusive: A variant of uncertain significance was detected in this patient's sample.   10/13/2021, EPO 1,966.  Haptoglobin < 8.  .  Reticulocyte 4%.  10/15/2021, WBC 3.71 hemoglobin 7.6 .3 platelets 11.  10/18/2021, MARTA negative.    10/22/2021, PNH profile:  Comment: Peripheral Blood:   Glycosylphosphatidylinositol (GPI) anchor deficient cells detected (4.48% of granulocytes, 5.93% of monocytes, 0.07% of red blood cells have features of type II red blood cells, 0.50% of red blood cells have features of type III red blood cells).  10/23/2021, CT chest angio:  No evidence of pulmonary embolus. No acute findings.    10/23/2021, CT abd angio:  1.  No major arterial occlusion or flow-limiting stenosis. Nonaneurysmal  abdominal aorta with heavy atherosclerotic calcification.  2.  2.6 cm indeterminate RIGHT adnexal  lesion. Recommend correlation  with pelvic ultrasound.           MEDICAL HISTORY   has a past medical history of Acid reflux, Anemia, and PNH (paroxysmal nocturnal hemoglobinuria) (HCC).   HEALTH MAINTENANCE ITEMS  Health Maintenance Due   Topic Date Due   • COLORECTAL CANCER SCREENING  Never done   • ANNUAL PHYSICAL  Never done   • TDAP/TD VACCINES (1 - Tdap) Never done   • ZOSTER VACCINE (1 of 2) Never done   • INFLUENZA VACCINE  Never done   • HEPATITIS C SCREENING  Never done     <no information>  Last Completed Colonoscopy     This patient has no relevant Health Maintenance data.        Immunization History   Administered Date(s) Administered   • COVID-19 (MODERNA) 03/01/2021, 03/31/2021   • Hepatitis B Vaccine Adult IM 05/10/2007   • Meningococcal B,(Bexsero) 10/22/2021   • Meningococcal MCV4P (Menactra) 10/22/2021     Last Completed Mammogram          MAMMOGRAM (Every 2 Years) Next due on 9/1/2023 09/01/2021  Outside Procedure: HC MAMMOGRAM SCREENING BILAT DIGITAL W CAD              FAMILY HISTORY  Family History   Problem Relation Age of Onset   • Diabetes Mother    • Hypertension Mother    • Heart attack Mother    • Hypertension Father    • Heart attack Father        Cancer-related family history is not on file.   SURGICAL HISTORY   has a past surgical history that includes Appendectomy.  SOCIAL HISTORY  Social History     Socioeconomic History   • Marital status:    Tobacco Use   • Smoking status: Never Smoker   • Smokeless tobacco: Never Used   Vaping Use   • Vaping Use: Never used   Substance and Sexual Activity   • Alcohol use: Not Currently   • Drug use: Never   • Sexual activity: Yes     MEDICATIONS:     Current Outpatient Medications   Medication Instructions   • atorvastatin (LIPITOR) 80 mg, Oral, Every Night at Bedtime, TAKING A 1/4 OF TABLET ONCE A DAY   • ciprofloxacin (CIPRO) 500 mg, Oral, 2 Times Daily   • ondansetron ODT (ZOFRAN-ODT) 4 mg, Translingual, Every 6 Hours PRN   •  "pantoprazole (PROTONIX) 20 mg, Oral, Daily   • potassium chloride (K-DUR,KLOR-CON) 20 MEQ CR tablet 20 mEq, Oral, 2 Times Daily   • psyllium (KONSYL) 28.3 % pack pack Oral, Daily      ALLERGIES:   No Known Allergies  ROS:   Pertinent positive and negative findings as noted in HPI.   PHYSICAL EXAM:   /70   Pulse 88   Temp 99.5 °F (37.5 °C)   Resp 16   Ht 157.5 cm (62\")   Wt 64.3 kg (141 lb 11.2 oz)   SpO2 98%   Breastfeeding No   BMI 25.92 kg/m²  Body surface area is 1.65 meters squared.   Pain Score    11/05/21 0923   PainSc: 0-No pain     Alert, oriented x3, cooperative, not in distress.  HEENT: Normocephalic, wearing a facemask.  Lungs: No tachypnea.   Extremities: No ankle edema.  Minimal bilateral ankle petechiae.  Neurologic: Moves all extremities.    INVESTIGATIONS/LABS:     Lab Results - Last 18 Months   Lab Units 11/05/21  0914 11/01/21  0806 10/27/21  1248 10/23/21  1012 10/21/21  0800 10/18/21  1018 10/15/21  1015 10/15/21  1015 10/13/21  1151 09/27/21  0942 09/27/21  0942   WBC 10*3/mm3 3.40 4.36 5.13 9.69 8.71 6.72   < > 3.71  --    < > 5.24   HEMOGLOBIN g/dL 8.1* 7.4* 8.2* 8.9* 9.3* 8.5*   < > 7.6*  --    < > 9.3*   HEMATOCRIT % 24.1* 21.2* 24.5* 26.5* 26.4* 25.0*   < > 22.4*  --    < > 27.4*   MCV fL 95.6 100.0* 99.6* 98.9* 98.1* 97.3*   < > 102.3*  --    < > 101.1*   PLATELETS 10*3/mm3 7* 10* 12* 13* 13* 13*   < > 11*  --    < > 18*   IMM GRAN % %  --  0.2  --   --   --   --   --  0.3  --   --   --    NEUTROS ABS 10*3/mm3 1.09* 1.79 2.07 4.67 2.40 3.13   < > 1.33* 2.43  --  3.09   LYMPHS ABS 10*3/mm3 1.81 2.14 2.68 4.05*  --  3.01  --  1.96 2.05  --   --    MONOS ABS 10*3/mm3 0.44 0.36 0.32 0.84  --  0.53  --  0.37 0.21*  --   --    EOS ABS 10*3/mm3 0.06 0.05 0.03 0.06  --  0.00  --  0.03 0.04  --   --    EOSINOPHIL ABS %  --   --   --   --   --   --   --   --  0.9  --   --    BASOS ABS 10*3/mm3 0.00 0.01 0.01 0.01  --  0.01  --  0.01  --   --   --    IMMATURE GRANS (ABS) 10*3/mm3  --  " 0.01  --   --   --   --   --  0.01  --   --   --    NRBC /100 WBC  --  0.0  --   --   --   --   --  0.5*  --   --   --    NEUTROPHIL % %  --   --   --   --  24.5*  --   --   --   --   --  56.0   MONOCYTES % %  --   --   --   --  4.1*  --   --   --  4.3  --  7.0   ANISOCYTOSIS   --   --   --  Slight/1+ Slight/1+  --   --   --  3+  --  Slight/1+    < > = values in this interval not displayed.       Lab Results - Last 18 Months   Lab Units 11/05/21  0914 11/01/21  0806 10/27/21  1248 10/23/21  1012 10/18/21  1018 09/27/21  0942   BUN mg/dL 7* 6* 9 12 10 10   CREATININE mg/dL 0.55* 0.48* 0.51* 0.49* 0.46* 0.44*   GLUCOSE mg/dL 103* 119* 111* 92 124* 107*   SODIUM mmol/L 139 139 141 141 140 141   POTASSIUM mmol/L 3.9 3.4* 3.4* 3.1* 3.0* 3.3*   CO2 mmol/L 26.0 26.0 26.0 30.0* 28.0 27.0   CHLORIDE mmol/L 103 104 104 102 103 106   ANION GAP mmol/L 10.0 9.0 11.0 9.0 9.0 8.0   BUN / CREAT RATIO  12.7 12.5 17.6 24.5 21.7 22.7   CALCIUM mg/dL 9.0 9.3 9.2 8.6 9.0 9.0   EGFR IF NONAFRICN AM mL/min/1.73 111 130 121 127 137 144   ALK PHOS U/L 70 64 57 57 67 70   TOTAL PROTEIN g/dL 7.2 7.1 7.4 6.6 7.5 7.2   ALT (SGPT) U/L 29 27 33 52* 42* 18   AST (SGOT) U/L 23 21 20 18 26 22   BILIRUBIN mg/dL 0.7 0.7 1.1 1.2 0.8 0.7   ALBUMIN g/dL 4.30 4.50 4.40 4.00 4.50 4.20   GLOBULIN gm/dL 2.9 2.6 3.0 2.6 3.0 3.0       Lab Results - Last 18 Months   Lab Units 10/21/21  0800 10/13/21  1008 09/27/21  0942   LDH U/L 270* 244* 280*       Lab Results - Last 18 Months   Lab Units 10/18/21  1018 10/13/21  1151 10/13/21  1008 10/01/21  1116 09/27/21  0942   IRON mcg/dL 230*  --   --  163 141   TIBC mcg/dL 282*  --   --  222* 280*   IRON SATURATION % 82*  --   --  73 50   FERRITIN ng/mL 872.00*  --   --  418* 472.40*   VITAMIN B 12 pg/mL 665  --  753  --  534   FOLATE ng/mL 17.10  --  16.6  --  >20.00   TSH mcunit/mL  --  1.291  --   --   --        CT Head Without Contrast    Result Date: 10/23/2021   1.  No acute intracranial finding. 2.  Mild  paranasal sinus mucosal thickening. 3.  Chronic microvascular ischemic white matter change. This report was finalized on 10/23/2021 11:54 by Dr. Justo Chaves MD.    CT Angiogram Abdomen Pelvis    Result Date: 10/23/2021   1.  No major arterial occlusion or flow-limiting stenosis. Nonaneurysmal abdominal aorta with heavy atherosclerotic calcification. 2.  2.6 cm indeterminate RIGHT adnexal lesion. Recommend correlation with pelvic ultrasound. This report was finalized on 10/23/2021 13:17 by Dr. Justo Chaves MD.    CT Angiogram Chest    Result Date: 10/23/2021   No evidence of pulmonary embolus. No acute findings. This report was finalized on 10/23/2021 13:12 by Dr. Justo Chaves MD.      ASSESSMENT:   PNH, anticipating treatment next week with Ultomiris.    Severe thrombocytopenia secondary to untreated PNH.    (The patient had infusion reaction at the very end of the second single donor unit).    PLAN/RECOMMENDATIONS:   Transfuse platelets today, 2 units of single donor.    VST 11/8/2021, labs 1h PTV.      DX/ORDERS:   Diagnoses and all orders for this visit:    1. PNH (paroxysmal nocturnal hemoglobinuria) (HCC) (Primary)  -     Type & Screen  -     CBC Auto Differential; Future  -     Type & Screen; Future        Future Appointments   Date Time Provider Department Center   11/5/2021 10:45 AM CHAIR 22 BH PAD OP INFU ONC BH PAD OIONC PAD   11/8/2021  9:00 AM  PAD CANCER CTR LAB BH PAD CCLAB PAD   11/8/2021 10:00 AM Devante Galvez MD [facility] ONC PAD PAD   11/8/2021 12:15 PM CHAIR 03 BH PAD OP INFU ONC BH PAD OIONC PAD   11/22/2021 11:35 AM BH PAD CANCER CTR LAB BH PAD CCLAB PAD   11/22/2021 12:15 PM CHAIR 11 BH PAD OP INFU ONC BH PAD OIONC PAD   11/23/2021  1:00 PM CHAIR 15 BH PAD OP INFU ONC BH PAD OIONC PAD   12/21/2021  1:00 PM CHAIR 13 BH PAD OP INFU ONC BH PAD OIONC PAD   1/17/2022 11:45 AM BH PAD CANCER CTR LAB BH PAD CCLAB PAD   1/17/2022 12:15 PM CHAIR 12 BH PAD OP INFU ONC  PAD OION PAD        Devante  MD Lenny  11/5/2021    cc: No ref. provider found, Faith Alcaraz, APRN

## 2021-11-06 LAB
BH BB BLOOD EXPIRATION DATE: NORMAL
BH BB BLOOD EXPIRATION DATE: NORMAL
BH BB BLOOD TYPE BARCODE: 5100
BH BB BLOOD TYPE BARCODE: 8400
BH BB DISPENSE STATUS: NORMAL
BH BB DISPENSE STATUS: NORMAL
BH BB PRODUCT CODE: NORMAL
BH BB PRODUCT CODE: NORMAL
BH BB UNIT NUMBER: NORMAL
BH BB UNIT NUMBER: NORMAL
UNIT  ABO: NORMAL
UNIT  ABO: NORMAL
UNIT  RH: NORMAL
UNIT  RH: NORMAL

## 2021-11-08 ENCOUNTER — LAB (OUTPATIENT)
Dept: LAB | Facility: HOSPITAL | Age: 66
End: 2021-11-08

## 2021-11-08 ENCOUNTER — OFFICE VISIT (OUTPATIENT)
Dept: ONCOLOGY | Facility: CLINIC | Age: 66
End: 2021-11-08

## 2021-11-08 ENCOUNTER — INFUSION (OUTPATIENT)
Dept: ONCOLOGY | Facility: HOSPITAL | Age: 66
End: 2021-11-08

## 2021-11-08 ENCOUNTER — TELEPHONE (OUTPATIENT)
Dept: ONCOLOGY | Facility: CLINIC | Age: 66
End: 2021-11-08

## 2021-11-08 VITALS
BODY MASS INDEX: 26.64 KG/M2 | SYSTOLIC BLOOD PRESSURE: 122 MMHG | HEART RATE: 97 BPM | TEMPERATURE: 96.7 F | HEIGHT: 61 IN | DIASTOLIC BLOOD PRESSURE: 64 MMHG | OXYGEN SATURATION: 99 % | WEIGHT: 141.1 LBS | RESPIRATION RATE: 16 BRPM

## 2021-11-08 VITALS
OXYGEN SATURATION: 100 % | WEIGHT: 140.1 LBS | HEART RATE: 100 BPM | BODY MASS INDEX: 26.45 KG/M2 | TEMPERATURE: 97.8 F | DIASTOLIC BLOOD PRESSURE: 64 MMHG | RESPIRATION RATE: 18 BRPM | SYSTOLIC BLOOD PRESSURE: 150 MMHG | HEIGHT: 61 IN

## 2021-11-08 DIAGNOSIS — D59.5 PNH (PAROXYSMAL NOCTURNAL HEMOGLOBINURIA) (HCC): Primary | ICD-10-CM

## 2021-11-08 DIAGNOSIS — T80.92XA BLOOD TRANSFUSION REACTION, INITIAL ENCOUNTER: ICD-10-CM

## 2021-11-08 DIAGNOSIS — D59.5 PNH (PAROXYSMAL NOCTURNAL HEMOGLOBINURIA) (HCC): ICD-10-CM

## 2021-11-08 LAB
ABO GROUP BLD: NORMAL
ALBUMIN SERPL-MCNC: 4.4 G/DL (ref 3.5–5.2)
ALBUMIN/GLOB SERPL: 1.6 G/DL
ALP SERPL-CCNC: 69 U/L (ref 39–117)
ALT SERPL W P-5'-P-CCNC: 37 U/L (ref 1–33)
ANION GAP SERPL CALCULATED.3IONS-SCNC: 9 MMOL/L (ref 5–15)
ANTI-E: NORMAL
AST SERPL-CCNC: 29 U/L (ref 1–32)
BASOPHILS # BLD AUTO: 0 10*3/MM3 (ref 0–0.2)
BASOPHILS NFR BLD AUTO: 0 % (ref 0–1.5)
BILIRUB SERPL-MCNC: 0.7 MG/DL (ref 0–1.2)
BLD GP AB SCN SERPL QL: POSITIVE
BUN SERPL-MCNC: 8 MG/DL (ref 8–23)
BUN/CREAT SERPL: 18.2 (ref 7–25)
CALCIUM SPEC-SCNC: 9.2 MG/DL (ref 8.6–10.5)
CHLORIDE SERPL-SCNC: 103 MMOL/L (ref 98–107)
CO2 SERPL-SCNC: 25 MMOL/L (ref 22–29)
CREAT SERPL-MCNC: 0.44 MG/DL (ref 0.57–1)
DEPRECATED RDW RBC AUTO: 63.6 FL (ref 37–54)
E AG RBC QL: NEGATIVE
EOSINOPHIL # BLD AUTO: 0.04 10*3/MM3 (ref 0–0.4)
EOSINOPHIL NFR BLD AUTO: 1.5 % (ref 0.3–6.2)
ERYTHROCYTE [DISTWIDTH] IN BLOOD BY AUTOMATED COUNT: 20.1 % (ref 12.3–15.4)
GFR SERPL CREATININE-BSD FRML MDRD: 144 ML/MIN/1.73
GLOBULIN UR ELPH-MCNC: 2.8 GM/DL
GLUCOSE SERPL-MCNC: 142 MG/DL (ref 65–99)
HCT VFR BLD AUTO: 20.9 % (ref 34–46.6)
HGB BLD-MCNC: 7.3 G/DL (ref 12–15.9)
IMM GRANULOCYTES # BLD AUTO: 0.01 10*3/MM3 (ref 0–0.05)
IMM GRANULOCYTES NFR BLD AUTO: 0.4 % (ref 0–0.5)
LYMPHOCYTES # BLD AUTO: 1.32 10*3/MM3 (ref 0.7–3.1)
LYMPHOCYTES NFR BLD AUTO: 48.2 % (ref 19.6–45.3)
MCH RBC QN AUTO: 33.5 PG (ref 26.6–33)
MCHC RBC AUTO-ENTMCNC: 34.9 G/DL (ref 31.5–35.7)
MCV RBC AUTO: 95.9 FL (ref 79–97)
MONOCYTES # BLD AUTO: 0.27 10*3/MM3 (ref 0.1–0.9)
MONOCYTES NFR BLD AUTO: 9.9 % (ref 5–12)
NEUTROPHILS NFR BLD AUTO: 1.1 10*3/MM3 (ref 1.7–7)
NEUTROPHILS NFR BLD AUTO: 40 % (ref 42.7–76)
NRBC BLD AUTO-RTO: 0 /100 WBC (ref 0–0.2)
PLATELET # BLD AUTO: 6 10*3/MM3 (ref 140–450)
PMV BLD AUTO: 9.5 FL (ref 6–12)
POTASSIUM SERPL-SCNC: 3.6 MMOL/L (ref 3.5–5.2)
PROT SERPL-MCNC: 7.2 G/DL (ref 6–8.5)
RBC # BLD AUTO: 2.18 10*6/MM3 (ref 3.77–5.28)
RH BLD: NEGATIVE
SODIUM SERPL-SCNC: 137 MMOL/L (ref 136–145)
T&S EXPIRATION DATE: NORMAL
WBC # BLD AUTO: 2.74 10*3/MM3 (ref 3.4–10.8)

## 2021-11-08 PROCEDURE — 85025 COMPLETE CBC W/AUTO DIFF WBC: CPT

## 2021-11-08 PROCEDURE — 36415 COLL VENOUS BLD VENIPUNCTURE: CPT | Performed by: INTERNAL MEDICINE

## 2021-11-08 PROCEDURE — 25010000002 RAVULIZUMAB-CWVZ 1100 MG/11ML SOLUTION 3 ML VIAL: Performed by: INTERNAL MEDICINE

## 2021-11-08 PROCEDURE — 86905 BLOOD TYPING RBC ANTIGENS: CPT

## 2021-11-08 PROCEDURE — 80053 COMPREHEN METABOLIC PANEL: CPT

## 2021-11-08 PROCEDURE — 86901 BLOOD TYPING SEROLOGIC RH(D): CPT

## 2021-11-08 PROCEDURE — 86900 BLOOD TYPING SEROLOGIC ABO: CPT

## 2021-11-08 PROCEDURE — 85397 CLOTTING FUNCT ACTIVITY: CPT | Performed by: INTERNAL MEDICINE

## 2021-11-08 PROCEDURE — 86850 RBC ANTIBODY SCREEN: CPT

## 2021-11-08 PROCEDURE — 96365 THER/PROPH/DIAG IV INF INIT: CPT

## 2021-11-08 PROCEDURE — 99214 OFFICE O/P EST MOD 30 MIN: CPT | Performed by: INTERNAL MEDICINE

## 2021-11-08 PROCEDURE — 86870 RBC ANTIBODY IDENTIFICATION: CPT

## 2021-11-08 RX ORDER — ACETAMINOPHEN 325 MG/1
650 TABLET ORAL ONCE AS NEEDED
Status: DISCONTINUED | OUTPATIENT
Start: 2021-11-08 | End: 2021-11-08 | Stop reason: HOSPADM

## 2021-11-08 RX ORDER — ACETAMINOPHEN 325 MG/1
650 TABLET ORAL ONCE
Start: 2021-11-08 | End: 2021-11-08

## 2021-11-08 RX ORDER — DIPHENHYDRAMINE HYDROCHLORIDE 50 MG/ML
25 INJECTION INTRAMUSCULAR; INTRAVENOUS ONCE
OUTPATIENT
Start: 2021-11-08 | End: 2021-11-08

## 2021-11-08 RX ORDER — DIPHENHYDRAMINE HYDROCHLORIDE 50 MG/ML
25 INJECTION INTRAMUSCULAR; INTRAVENOUS ONCE AS NEEDED
Status: DISCONTINUED | OUTPATIENT
Start: 2021-11-08 | End: 2021-11-08 | Stop reason: HOSPADM

## 2021-11-08 RX ORDER — SODIUM CHLORIDE 9 MG/ML
250 INJECTION, SOLUTION INTRAVENOUS ONCE
Status: COMPLETED | OUTPATIENT
Start: 2021-11-08 | End: 2021-11-08

## 2021-11-08 RX ADMIN — SODIUM CHLORIDE 250 ML: 9 INJECTION, SOLUTION INTRAVENOUS at 11:19

## 2021-11-08 RX ADMIN — RAVULIZUMAB 27 ML: 300 SOLUTION, CONCENTRATE INTRAVENOUS at 12:01

## 2021-11-08 NOTE — PROGRESS NOTES
[facility] ON[facility] HEMATOLOGY & ONCOLOGY  [address]  [place] [address]  [phone]       11/08/2021     PROGRESS NOTE     PATIENT NAME: Ratna Cohne  YOB: 1955  66 y.o. MR: [id number]    PCP: Faith Alcaraz APRN    HPI:   Ms. Ratna Cohen is feeling fair today.  She has mild fatigue.  She denies dyspnea, palpitation or chest pain.  She denies evidence of bleeding.  She came accompanied by her .  We reviewed that the hemoglobin and platelet count are very low.  Last Friday, 3 days ago, she received 2 units of single donor platelet transfusion but there was no improvement in platelet count.  She had a transfusion reaction to the platelets.  She had severe chills, tachycardia.  Because of that, we decided not to transfuse platelets today.  Today she is feeling much better and reports that the transfusion reaction symptoms resolved.  Since she is tolerating the anemia relatively well, we decided to not transfuse PRBCs and proceed with the infusion of Ultomiris.  Both the  and the patient agreed with the plan.  PAST HISTORY:     HEME/ONC HISTORY  Oncology/Hematology History Overview Note     HEME/ONC DX/RX/INVESTIGATIONS SUMMARY:   DX:   PNH (minimal neutropenia, severe anemia, severe thrombocytopenia).   Unremarkable BMBX on 10/4/2021. Anemic symptoms started in 2020, petechiae started in 6/2021.  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    10/23/2021, CT abd, undetermined 2.6 cm R adnexal lesion, needs further eval. when patient is more stable.    RX:   9/21/2021, 2U PRBCs and 2 platelet transfusions.  10/15/2021, 1U PRBC.  Prednisone: 10/15/2021, start 80 mg QD. 10/22/2021, DC prednisone.  10/22/2021, Meningococcus vaccines 1st dose (Bexsero and Menactra)  11/8/2021, Ultomiris, 1st dose (loading dose).  11/29/2021 (anticipated), Ultomiris Q8wks,  C1.    OTHER INFO:   No prior hematological history or significant medical history until 9/21/2021.    Stress test was positive during admission at [facility] in 9/21/2021, cardiac cath not possible because of thrombocytopenia.  Patient never had chest pain, only dyspnea and weakness.    INVESTIGATIONS:   9/21/2021-9:48 AM, WBC 3.4 hemoglobin 7.7 .8 platelets 12 neutrophils 1.1 lymphocytes 1.8.  9/21/2021-17:20 PM, WBC 3.9 hemoglobin 8.3 .1 platelets 23.  9/22/2021, folic acid 25.9 ferritin 365 B12 368 HBsAg negative, HIV negative, hep C antibody negative reticulocyte 4.0%, PT 10.7, INR 1.0 PTT 25.3. Fecal occult blood negative.  9/22/2021, 1:37 AM, WBC 3.6 hemoglobin 7.0 .5 platelets 11 neutrophils 1.06 lymphocytes 2.19.  9/23/2021, WBC 4.0 hemoglobin 10.4 .1 platelets 12.  Neutrophils 1.38 lymphocytes 2.0. BUN 11 creatinine 0.47 calcium 8.5 bilirubin 0.7 ALT 31 AST 26 alk phos 68 albumin 3.7 total protein 7.1.  10/4/201, Diagnosis: BONE MARROW - PERIPHERAL BLOOD SMEAR, ASPIRATE SMEAR, PARTICLE PREPARATION, BIOPSY AND FLOW CYTOMETRY: NORMOCELLULAR MARROW WITH MATURING TRILINEAGE HEMATOPOIESIS, ERYTHROID ATYPIA, AND NO INCREASE IN BLASTS; SEE IMPRESSION.   10/4/2021, Myeloid NGS Panel RESULTS SUMMARY: (Final).Inconclusive: A variant of uncertain significance was detected in this patient's sample.   10/13/2021, EPO 1,966.  Haptoglobin < 8.  .  Reticulocyte 4%.  10/15/2021, WBC 3.71 hemoglobin 7.6 .3 platelets 11.  10/18/2021, MARTA negative.    10/22/2021, PNH profile:  Comment: Peripheral Blood:   Glycosylphosphatidylinositol (GPI) anchor deficient cells detected (4.48% of granulocytes, 5.93% of monocytes, 0.07% of red blood cells have features of type II red blood cells, 0.50% of red blood cells have features of type III red blood cells).  10/23/2021, CT chest angio:  No evidence of pulmonary embolus. No acute findings.    10/23/2021, CT abd angio:  1.   No major arterial occlusion or flow-limiting stenosis. Nonaneurysmal  abdominal aorta with heavy atherosclerotic calcification.  2.  2.6 cm indeterminate RIGHT adnexal lesion. Recommend correlation  with pelvic ultrasound.    11/8/2021, type & screen, anti-E antibody           MEDICAL HISTORY   has a past medical history of Acid reflux, Anemia, and PNH (paroxysmal nocturnal hemoglobinuria) (HCC).   HEALTH MAINTENANCE ITEMS  Health Maintenance Due   Topic Date Due   • COLORECTAL CANCER SCREENING  Never done   • ANNUAL PHYSICAL  Never done   • TDAP/TD VACCINES (1 - Tdap) Never done   • ZOSTER VACCINE (1 of 2) Never done   • INFLUENZA VACCINE  Never done   • HEPATITIS C SCREENING  Never done     <no information>  Last Completed Colonoscopy     This patient has no relevant Health Maintenance data.        Immunization History   Administered Date(s) Administered   • COVID-19 (MODERNA) 03/01/2021, 03/31/2021   • Hepatitis B Vaccine Adult IM 05/10/2007   • Meningococcal B,(Bexsero) 10/22/2021   • Meningococcal MCV4P (Menactra) 10/22/2021     Last Completed Mammogram          MAMMOGRAM (Every 2 Years) Next due on 9/1/2023 09/01/2021  Outside Procedure: HC MAMMOGRAM SCREENING BILAT DIGITAL W CAD              FAMILY HISTORY  Family History   Problem Relation Age of Onset   • Diabetes Mother    • Hypertension Mother    • Heart attack Mother    • Hypertension Father    • Heart attack Father        Cancer-related family history is not on file.   SURGICAL HISTORY   has a past surgical history that includes Appendectomy.  SOCIAL HISTORY  Social History     Socioeconomic History   • Marital status:    Tobacco Use   • Smoking status: Never Smoker   • Smokeless tobacco: Never Used   Vaping Use   • Vaping Use: Never used   Substance and Sexual Activity   • Alcohol use: Not Currently   • Drug use: Never   • Sexual activity: Yes     MEDICATIONS:     Current Outpatient Medications   Medication Instructions   • ciprofloxacin  "(CIPRO) 500 mg, Oral, 2 Times Daily   • pantoprazole (PROTONIX) 20 mg, Oral, Daily   • potassium chloride (K-DUR,KLOR-CON) 20 MEQ CR tablet 20 mEq, Oral, 2 Times Daily   • psyllium (KONSYL) 28.3 % pack pack Oral, Daily      ALLERGIES:   No Known Allergies  ROS:   Pertinent positive and negative findings as noted in HPI.   PHYSICAL EXAM:   /64   Pulse 97   Temp 96.7 °F (35.9 °C)   Resp 16   Ht 154.9 cm (61\")   Wt 64 kg (141 lb 1.6 oz)   SpO2 99%   Breastfeeding No   BMI 26.66 kg/m²  Body surface area is 1.63 meters squared.   Pain Score    11/08/21 0952   PainSc: 0-No pain     Alert, oriented x3, cooperative, not in distress, pale.  HEENT: Normocephalic, wearing a facemask.  Lungs: No tachypnea.   Extremities: No ankle edema.  Neurologic: Moves all extremities.    INVESTIGATIONS/LABS:     Lab Results - Last 18 Months   Lab Units 11/08/21  0915 11/05/21  0914 11/01/21  0806 10/27/21  1248 10/23/21  1012 10/21/21  0800 10/18/21  1018 10/15/21  1015 10/15/21  1015 10/13/21  1151 09/27/21  0942 09/27/21  0942   WBC 10*3/mm3 2.74* 3.40 4.36 5.13 9.69 8.71 6.72   < > 3.71  --    < > 5.24   HEMOGLOBIN g/dL 7.3* 8.1* 7.4* 8.2* 8.9* 9.3* 8.5*   < > 7.6*  --    < > 9.3*   HEMATOCRIT % 20.9* 24.1* 21.2* 24.5* 26.5* 26.4* 25.0*   < > 22.4*  --    < > 27.4*   MCV fL 95.9 95.6 100.0* 99.6* 98.9* 98.1* 97.3*   < > 102.3*  --    < > 101.1*   PLATELETS 10*3/mm3 6* 7* 10* 12* 13* 13* 13*   < > 11*  --    < > 18*   IMM GRAN % % 0.4  --  0.2  --   --   --   --   --  0.3  --   --   --    NEUTROS ABS 10*3/mm3 1.10* 1.09* 1.79 2.07 4.67 2.40 3.13   < > 1.33* 2.43   < > 3.09   LYMPHS ABS 10*3/mm3 1.32 1.81 2.14 2.68 4.05*  --  3.01   < > 1.96 2.05  --   --    MONOS ABS 10*3/mm3 0.27 0.44 0.36 0.32 0.84  --  0.53   < > 0.37 0.21*  --   --    EOS ABS 10*3/mm3 0.04 0.06 0.05 0.03 0.06  --  0.00   < > 0.03 0.04  --   --    EOSINOPHIL ABS %  --   --   --   --   --   --   --   --   --  0.9  --   --    BASOS ABS 10*3/mm3 0.00 0.00 " 0.01 0.01 0.01  --  0.01   < > 0.01  --   --   --    IMMATURE GRANS (ABS) 10*3/mm3 0.01  --  0.01  --   --   --   --   --  0.01  --   --   --    NRBC /100 WBC 0.0  --  0.0  --   --   --   --   --  0.5*  --   --   --    NEUTROPHIL % %  --   --   --   --   --  24.5*  --   --   --   --   --  56.0   MONOCYTES % %  --   --   --   --   --  4.1*  --   --   --  4.3  --  7.0   ANISOCYTOSIS   --   --   --   --  Slight/1+ Slight/1+  --   --   --  3+  --  Slight/1+    < > = values in this interval not displayed.       Lab Results - Last 18 Months   Lab Units 11/08/21  0915 11/05/21  0914 11/01/21  0806 10/27/21  1248 10/23/21  1012 10/18/21  1018   BUN mg/dL 8 7* 6* 9 12 10   CREATININE mg/dL 0.44* 0.55* 0.48* 0.51* 0.49* 0.46*   GLUCOSE mg/dL 142* 103* 119* 111* 92 124*   SODIUM mmol/L 137 139 139 141 141 140   POTASSIUM mmol/L 3.6 3.9 3.4* 3.4* 3.1* 3.0*   CO2 mmol/L 25.0 26.0 26.0 26.0 30.0* 28.0   CHLORIDE mmol/L 103 103 104 104 102 103   ANION GAP mmol/L 9.0 10.0 9.0 11.0 9.0 9.0   BUN / CREAT RATIO  18.2 12.7 12.5 17.6 24.5 21.7   CALCIUM mg/dL 9.2 9.0 9.3 9.2 8.6 9.0   EGFR IF NONAFRICN AM mL/min/1.73 144 111 130 121 127 137   ALK PHOS U/L 69 70 64 57 57 67   TOTAL PROTEIN g/dL 7.2 7.2 7.1 7.4 6.6 7.5   ALT (SGPT) U/L 37* 29 27 33 52* 42*   AST (SGOT) U/L 29 23 21 20 18 26   BILIRUBIN mg/dL 0.7 0.7 0.7 1.1 1.2 0.8   ALBUMIN g/dL 4.40 4.30 4.50 4.40 4.00 4.50   GLOBULIN gm/dL 2.8 2.9 2.6 3.0 2.6 3.0       Lab Results - Last 18 Months   Lab Units 10/21/21  0800 10/13/21  1008 09/27/21  0942   LDH U/L 270* 244* 280*       Lab Results - Last 18 Months   Lab Units 10/18/21  1018 10/13/21  1151 10/13/21  1008 10/01/21  1116 09/27/21  0942   IRON mcg/dL 230*  --   --  163 141   TIBC mcg/dL 282*  --   --  222* 280*   IRON SATURATION % 82*  --   --  73 50   FERRITIN ng/mL 872.00*  --   --  418* 472.40*   VITAMIN B 12 pg/mL 665  --  753  --  534   FOLATE ng/mL 17.10  --  16.6  --  >20.00   TSH mcunit/mL  --  1.291  --   --   --         CT Head Without Contrast    Result Date: 10/23/2021   1.  No acute intracranial finding. 2.  Mild paranasal sinus mucosal thickening. 3.  Chronic microvascular ischemic white matter change. This report was finalized on 10/23/2021 11:54 by Dr. Justo Chaves MD.    CT Angiogram Abdomen Pelvis    Result Date: 10/23/2021   1.  No major arterial occlusion or flow-limiting stenosis. Nonaneurysmal abdominal aorta with heavy atherosclerotic calcification. 2.  2.6 cm indeterminate RIGHT adnexal lesion. Recommend correlation with pelvic ultrasound. This report was finalized on 10/23/2021 13:17 by Dr. Justo Chaves MD.    CT Angiogram Chest    Result Date: 10/23/2021   No evidence of pulmonary embolus. No acute findings. This report was finalized on 10/23/2021 13:12 by Dr. Justo Chaves MD.      ASSESSMENT:   PNH, treated so far with PRBCs and platelet transfusions.    Today, again she shows minimal neutropenia.    Severe hemolytic anemia. The patient was noted to have RBCs antibody screen positive today, anti-E, alloantibody.    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    PLAN/RECOMMENDATIONS:   Proceed with infusion today, ravulizumab, loading dose.    No transfusions today.    VST tomorrow. Labs 1h PTV.    DX/ORDERS:   Diagnoses and all orders for this visit:    1. PNH (paroxysmal nocturnal hemoglobinuria) (HCC) (Primary)  -     APSTVC16 Activity  -     CBC Auto Differential; Future  -     Comprehensive Metabolic Panel; Future        Future Appointments   Date Time Provider Department Center   11/9/2021 10:30 AM  [facility]  [facility]   11/9/2021 11:30 AM Devante Galvez MD [facility]   11/22/2021 11:35 AM  [facility]   11/22/2021 12:15 PM CHAIR 11 [facility]  [facility]   11/23/2021  1:00 PM CHAIR 15  [facility]  [facility]   12/21/2021  1:00 PM CHAIR 13  [facility]  [facility]   1/17/2022 11:45 AM  [facility]  [facility]   1/17/2022 12:15 PM CHAIR 12  [facility]        Devante Galvez MD  11/8/2021    cc: No ref. provider found, Faith Alcaraz, APRN

## 2021-11-08 NOTE — PATIENT INSTRUCTIONS
Ravulizumab injection  What is this medicine?  RAVULIZUMAB (marya ue TANK ue mab) is a monoclonal antibody. It is used to treat a rare kind of anemia called paroxysmal nocturnal hemoglobinuria or PNH. It may help prevent the loss of blood in patients with PNH. It is also used to treat atypical hemolytic uremic syndrome.  This medicine may be used for other purposes; ask your health care provider or pharmacist if you have questions.  COMMON BRAND NAME(S): DOMENICA  What should I tell my health care provider before I take this medicine?  They need to know if you have any of these conditions:  · infection  · not received meningococcal vaccine  · an unusual or allergic reaction to ravulizumab, other medicines, foods, dyes, or preservatives  · pregnant or trying to get pregnant  · breast-feeding  How should I use this medicine?  This medicine is for infusion into a vein. It is given by a health care professional in a hospital or clinic setting.  A special MedGuide will be given to you by the pharmacist with each prescription and refill. Be sure to read this information carefully each time.  Talk to your pediatrician about the use of this medicine in children. While this drug may be prescribed for children as young as 1 month for selected conditions, precautions do apply.  Overdosage: If you think you have taken too much of this medicine contact a poison control center or emergency room at once.  NOTE: This medicine is only for you. Do not share this medicine with others.  What if I miss a dose?  Keep appointments for follow-up doses as directed. It is important not to miss your dose. Call your doctor or healthcare professional if you are unable to keep an appointment.  What may interact with this medicine?  Interactions are not expected.  This list may not describe all possible interactions. Give your health care provider a list of all the medicines, herbs, non-prescription drugs, or dietary supplements you use. Also tell  them if you smoke, drink alcohol, or use illegal drugs. Some items may interact with your medicine.  What should I watch for while using this medicine?  Tell your doctor of healthcare professional if your symptoms do not start to get better or if they get worse.  Your condition will be monitored carefully while you are receiving this medicine.  You may need blood work done while you are taking this medicine.  Call your doctor or health care professional for advice if you get a fever, chills or sore throat, or other symptoms of a cold or flu. Do not treat yourself. This drug decreases your body's ability to fight infections. Try to avoid being around people who are sick.  What side effects may I notice from receiving this medicine?  Side effects that you should report to your doctor or health care professional as soon as possible:  · allergic reactions like skin rash, itching or hives, swelling of the face, lips, or tongue  · breathing problems  · signs and symptoms of infection like fever or chills; cough; sore throat; pain or trouble passing urine  · signs and symptoms of low blood pressure like dizziness; feeling faint or lightheaded, falls; unusually weak or tired  Side effects that usually do not require medical attention (report these to your doctor or health care professional if they continue or are bothersome):  · diarrhea  · headache  · nausea, vomiting  · pain, redness, or irritation at site where injected  · stomach pain  This list may not describe all possible side effects. Call your doctor for medical advice about side effects. You may report side effects to FDA at 2-889-FDA-8470.  Where should I keep my medicine?  This medicine is given in a hospital or clinic and will not be stored at home.  NOTE: This sheet is a summary. It may not cover all possible information. If you have questions about this medicine, talk to your doctor, pharmacist, or health care provider.  © 2021 [employer]vier/Gold Standard  (2019-10-22 11:23:19)

## 2021-11-08 NOTE — PROGRESS NOTES
Subjective     PATIENT NAME:  Ratna Cohen  YOB: 1955  PATIENTS AGE:  66 y.o.  PATIENTS SEX:  female  DATE OF SERVICE:  11/08/2021  PROVIDER:  Devante Galvez MD      ____________________PATIENT EDUCATION____________________    PATIENT EDUCATION:  Today I met with the patient to discuss the immunotherapy regimen recommended for treatment of her disease.  The patient was given explanation of treatment premed side effects including office policy that prohibits patients to drive if sedating medications are administered, MD explanation given regarding benefits, side effects, toxicities and goals of treatment.  The patient received a Chemotherapy/Biotherapy Plan Summary including diagnosis and specific treatment plan.    SIDE EFFECTS:  Common side effects were discussed with the patient and/or significant other.  Discussion included headache, lung infection, muscle pain,fever, diarrhea,nausea/vomiting, infusion reaction, and possible meningitis.  The patient was advised that if complications occur, additional medical treatment is available.    Discussion also included side effects specific to drugs in the treatment plan, specifically.      A total of 10 minutes were spent with the patient, with 100% of time spent in education and counseling.

## 2021-11-08 NOTE — PROGRESS NOTES
[id number] Contacted Becki with MD office to clarify ok to start Ravuliumab today r/t pt labs. Per Dr Galvez ok to proceed today with treatment. SHREYAS Whitaker RN

## 2021-11-08 NOTE — TELEPHONE ENCOUNTER
CRITICAL LAB VALUE:  Received call from [facility] with CRITICAL LAB VALUES:    HCT: 20.9  PLT: 6    HGB: 7.3  This information was relayed to Becki to pass on to Dr Galvez for review

## 2021-11-09 ENCOUNTER — TELEPHONE (OUTPATIENT)
Dept: ONCOLOGY | Facility: CLINIC | Age: 66
End: 2021-11-09

## 2021-11-09 ENCOUNTER — LAB (OUTPATIENT)
Dept: LAB | Facility: HOSPITAL | Age: 66
End: 2021-11-09

## 2021-11-09 ENCOUNTER — OFFICE VISIT (OUTPATIENT)
Dept: ONCOLOGY | Facility: CLINIC | Age: 66
End: 2021-11-09

## 2021-11-09 VITALS
OXYGEN SATURATION: 99 % | BODY MASS INDEX: 26.64 KG/M2 | WEIGHT: 141.1 LBS | HEIGHT: 61 IN | SYSTOLIC BLOOD PRESSURE: 118 MMHG | DIASTOLIC BLOOD PRESSURE: 70 MMHG | TEMPERATURE: 96.5 F | RESPIRATION RATE: 16 BRPM | HEART RATE: 85 BPM

## 2021-11-09 DIAGNOSIS — D59.5 PNH (PAROXYSMAL NOCTURNAL HEMOGLOBINURIA) (HCC): Primary | ICD-10-CM

## 2021-11-09 LAB
ALBUMIN SERPL-MCNC: 4.6 G/DL (ref 3.5–5.2)
ALBUMIN/GLOB SERPL: 1.6 G/DL
ALP SERPL-CCNC: 75 U/L (ref 39–117)
ALT SERPL W P-5'-P-CCNC: 35 U/L (ref 1–33)
ANION GAP SERPL CALCULATED.3IONS-SCNC: 9 MMOL/L (ref 5–15)
ANISOCYTOSIS BLD QL: ABNORMAL
AST SERPL-CCNC: 24 U/L (ref 1–32)
BILIRUB SERPL-MCNC: 0.7 MG/DL (ref 0–1.2)
BUN SERPL-MCNC: 7 MG/DL (ref 8–23)
BUN/CREAT SERPL: 15.2 (ref 7–25)
CALCIUM SPEC-SCNC: 9.2 MG/DL (ref 8.6–10.5)
CHLORIDE SERPL-SCNC: 102 MMOL/L (ref 98–107)
CO2 SERPL-SCNC: 26 MMOL/L (ref 22–29)
CREAT SERPL-MCNC: 0.46 MG/DL (ref 0.57–1)
DEPRECATED RDW RBC AUTO: 65.5 FL (ref 37–54)
EOSINOPHIL # BLD MANUAL: 0.11 10*3/MM3 (ref 0–0.4)
EOSINOPHIL NFR BLD MANUAL: 3 % (ref 0.3–6.2)
ERYTHROCYTE [DISTWIDTH] IN BLOOD BY AUTOMATED COUNT: 20.3 % (ref 12.3–15.4)
GFR SERPL CREATININE-BSD FRML MDRD: 137 ML/MIN/1.73
GLOBULIN UR ELPH-MCNC: 2.9 GM/DL
GLUCOSE SERPL-MCNC: 101 MG/DL (ref 65–99)
HCT VFR BLD AUTO: 21.3 % (ref 34–46.6)
HGB BLD-MCNC: 7.3 G/DL (ref 12–15.9)
HOLD SPECIMEN: NORMAL
LYMPHOCYTES # BLD MANUAL: 1.99 10*3/MM3 (ref 0.7–3.1)
LYMPHOCYTES NFR BLD MANUAL: 55 % (ref 19.6–45.3)
LYMPHOCYTES NFR BLD MANUAL: 9 % (ref 5–12)
MCH RBC QN AUTO: 33.2 PG (ref 26.6–33)
MCHC RBC AUTO-ENTMCNC: 34.3 G/DL (ref 31.5–35.7)
MCV RBC AUTO: 96.8 FL (ref 79–97)
MONOCYTES # BLD AUTO: 0.32 10*3/MM3 (ref 0.1–0.9)
NEUTROPHILS # BLD AUTO: 1.19 10*3/MM3 (ref 1.7–7)
NEUTROPHILS NFR BLD MANUAL: 33 % (ref 42.7–76)
PLATELET # BLD AUTO: 8 10*3/MM3 (ref 140–450)
POLYCHROMASIA BLD QL SMEAR: ABNORMAL
POTASSIUM SERPL-SCNC: 3.8 MMOL/L (ref 3.5–5.2)
PROT SERPL-MCNC: 7.5 G/DL (ref 6–8.5)
RBC # BLD AUTO: 2.2 10*6/MM3 (ref 3.77–5.28)
SMALL PLATELETS BLD QL SMEAR: ABNORMAL
SODIUM SERPL-SCNC: 137 MMOL/L (ref 136–145)
WBC # BLD AUTO: 3.61 10*3/MM3 (ref 3.4–10.8)
WBC MORPH BLD: NORMAL

## 2021-11-09 PROCEDURE — 99214 OFFICE O/P EST MOD 30 MIN: CPT | Performed by: INTERNAL MEDICINE

## 2021-11-09 PROCEDURE — 85025 COMPLETE CBC W/AUTO DIFF WBC: CPT

## 2021-11-09 PROCEDURE — 85007 BL SMEAR W/DIFF WBC COUNT: CPT

## 2021-11-09 PROCEDURE — 80053 COMPREHEN METABOLIC PANEL: CPT

## 2021-11-09 PROCEDURE — 36415 COLL VENOUS BLD VENIPUNCTURE: CPT

## 2021-11-09 NOTE — TELEPHONE ENCOUNTER
CRITICAL LAB VALUE  Received call from Perla BALDWIN, Hematology with CRITICAL LAB VALUE    PLT: 8  This information was relayed to Becki COTTON for Dr Galvez for information.

## 2021-11-09 NOTE — PROGRESS NOTES
[facility]  [address]  [place] [address]  [phone]       11/09/2021     PROGRESS NOTE     PATIENT NAME: Ratna Cohen  YOB: 1955  66 y.o. MR: [id number]    PCP: Faith Alcaraz APRN    HPI:   Ms. Ratna Cohen received Ultomiris infusion yesterday without any problem.  Today she was feeling slight lightheaded, jittery, she ate candy and those symptoms resolved.  She came accompanied by her  as usual.  We reviewed the results of the laboratory evaluation.  We discussed that the hemoglobin remains stable at 7.3 and the platelet minimally increased to 8.  We decided to continue observation.  She is tolerating prophylactic Cipro well (meningitis prophylaxis because of Ultomiris).  PAST HISTORY:     HEME/ONC HISTORY  Oncology/Hematology History Overview Note     HEME/ONC DX/RX/INVESTIGATIONS SUMMARY:   DX:   [facility] (minimal neutropenia, severe anemia, severe thrombocytopenia).   Unremarkable BMBX on 10/4/2021. Anemic symptoms started in 2020, petechiae started in 6/2021.  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    10/23/2021, CT abd, undetermined 2.6 cm R adnexal lesion, needs further eval. when patient is more stable.    RX:   9/21/2021, 2U PRBCs and 2 platelet transfusions.  10/15/2021, 1U PRBC.  Prednisone: 10/15/2021, start 80 mg QD. 10/22/2021, DC prednisone.  10/22/2021, Meningococcus vaccines 1st dose (Bexsero and Menactra)  11/8/2021, Ultomiris, 1st dose (loading dose).  11/29/2021 (anticipated), Ultomiris Q8wks, C1.  11/8/20201, Cipro 500 mg PO BID x 14 days (prophylactic b/o Ultomiris).    OTHER INFO:   No prior hematological history or significant medical history until 9/21/2021.    Stress test was positive during admission at [facility] in 9/21/2021, cardiac cath not possible because of  thrombocytopenia.  Patient never had chest pain, only dyspnea and weakness.    INVESTIGATIONS:   9/21/2021-9:48 AM, WBC 3.4 hemoglobin 7.7 .8 platelets 12 neutrophils 1.1 lymphocytes 1.8.  9/21/2021-17:20 PM, WBC 3.9 hemoglobin 8.3 .1 platelets 23.  9/22/2021, folic acid 25.9 ferritin 365 B12 368 HBsAg negative, HIV negative, hep C antibody negative reticulocyte 4.0%, PT 10.7, INR 1.0 PTT 25.3. Fecal occult blood negative.  9/22/2021, 1:37 AM, WBC 3.6 hemoglobin 7.0 .5 platelets 11 neutrophils 1.06 lymphocytes 2.19.  9/23/2021, WBC 4.0 hemoglobin 10.4 .1 platelets 12.  Neutrophils 1.38 lymphocytes 2.0. BUN 11 creatinine 0.47 calcium 8.5 bilirubin 0.7 ALT 31 AST 26 alk phos 68 albumin 3.7 total protein 7.1.  10/4/201, Diagnosis: BONE MARROW - PERIPHERAL BLOOD SMEAR, ASPIRATE SMEAR, PARTICLE PREPARATION, BIOPSY AND FLOW CYTOMETRY: NORMOCELLULAR MARROW WITH MATURING TRILINEAGE HEMATOPOIESIS, ERYTHROID ATYPIA, AND NO INCREASE IN BLASTS; SEE IMPRESSION.   10/4/2021, Myeloid NGS Panel RESULTS SUMMARY: (Final).Inconclusive: A variant of uncertain significance was detected in this patient's sample.   10/13/2021, EPO 1,966.  Haptoglobin < 8.  .  Reticulocyte 4%.  10/15/2021, WBC 3.71 hemoglobin 7.6 .3 platelets 11.  10/18/2021, MARTA negative.    10/22/2021, PNH profile:  Comment: Peripheral Blood:   Glycosylphosphatidylinositol (GPI) anchor deficient cells detected (4.48% of granulocytes, 5.93% of monocytes, 0.07% of red blood cells have features of type II red blood cells, 0.50% of red blood cells have features of type III red blood cells).  10/23/2021, CT chest angio:  No evidence of pulmonary embolus. No acute findings.    10/23/2021, CT abd angio:  1.  No major arterial occlusion or flow-limiting stenosis. Nonaneurysmal  abdominal aorta with heavy atherosclerotic calcification.  2.  2.6 cm indeterminate RIGHT adnexal lesion. Recommend correlation  with pelvic  ultrasound.    11/8/2021, type & screen, anti-E antibody           MEDICAL HISTORY   has a past medical history of Acid reflux, Anemia, and PNH (paroxysmal nocturnal hemoglobinuria) (HCC).   HEALTH MAINTENANCE ITEMS  Health Maintenance Due   Topic Date Due   • COLORECTAL CANCER SCREENING  Never done   • ANNUAL PHYSICAL  Never done   • TDAP/TD VACCINES (1 - Tdap) Never done   • ZOSTER VACCINE (1 of 2) Never done   • INFLUENZA VACCINE  Never done   • HEPATITIS C SCREENING  Never done     <no information>  Last Completed Colonoscopy     This patient has no relevant Health Maintenance data.        Immunization History   Administered Date(s) Administered   • COVID-19 (MODERNA) 03/01/2021, 03/31/2021   • Hepatitis B Vaccine Adult IM 05/10/2007   • Meningococcal B,(Bexsero) 10/22/2021   • Meningococcal MCV4P (Menactra) 10/22/2021     Last Completed Mammogram          MAMMOGRAM (Every 2 Years) Next due on 9/1/2023 09/01/2021  Outside Procedure: HC MAMMOGRAM SCREENING BILAT DIGITAL W CAD              FAMILY HISTORY  Family History   Problem Relation Age of Onset   • Diabetes Mother    • Hypertension Mother    • Heart attack Mother    • Hypertension Father    • Heart attack Father        Cancer-related family history is not on file.   SURGICAL HISTORY   has a past surgical history that includes Appendectomy.  SOCIAL HISTORY  Social History     Socioeconomic History   • Marital status:    Tobacco Use   • Smoking status: Never Smoker   • Smokeless tobacco: Never Used   Vaping Use   • Vaping Use: Never used   Substance and Sexual Activity   • Alcohol use: Not Currently   • Drug use: Never   • Sexual activity: Yes     MEDICATIONS:     Current Outpatient Medications   Medication Instructions   • ciprofloxacin (CIPRO) 500 mg, Oral, 2 Times Daily   • pantoprazole (PROTONIX) 20 mg, Oral, Daily   • potassium chloride (K-DUR,KLOR-CON) 20 MEQ CR tablet 20 mEq, Oral, 2 Times Daily      ALLERGIES:   No Known Allergies  ROS:  "  Pertinent positive and negative findings as noted in HPI.   PHYSICAL EXAM:   /70   Pulse 85   Temp 96.5 °F (35.8 °C)   Resp 16   Ht 154.9 cm (61\")   Wt 64 kg (141 lb 1.6 oz)   SpO2 99%   Breastfeeding No   BMI 26.66 kg/m²  Body surface area is 1.63 meters squared.   Pain Score    11/09/21 1052   PainSc: 0-No pain     Alert, oriented x3, cooperative, not in distress.  HEENT: Normocephalic, wearing a facemask.  Lungs: No tachypnea.   Heart: Regular rate and rhythm.  Extremities: No ankle edema.  Neurologic: Moves all extremities.    INVESTIGATIONS/LABS:     Lab Results - Last 18 Months   Lab Units 11/09/21  1041 11/08/21  0915 11/05/21  0914 11/01/21  0806 10/27/21  1248 10/23/21  1012 10/21/21  0800 10/21/21  0800 10/18/21  1018 10/15/21  1015 10/15/21  1015 10/13/21  1151 09/27/21  0942 09/27/21  0942   WBC 10*3/mm3 3.61 2.74* 3.40 4.36 5.13 9.69   < > 8.71 6.72   < > 3.71  --    < > 5.24   HEMOGLOBIN g/dL 7.3* 7.3* 8.1* 7.4* 8.2* 8.9*   < > 9.3* 8.5*   < > 7.6*  --    < > 9.3*   HEMATOCRIT % 21.3* 20.9* 24.1* 21.2* 24.5* 26.5*   < > 26.4* 25.0*   < > 22.4*  --    < > 27.4*   MCV fL 96.8 95.9 95.6 100.0* 99.6* 98.9*   < > 98.1* 97.3*   < > 102.3*  --    < > 101.1*   PLATELETS 10*3/mm3 8* 6* 7* 10* 12* 13*   < > 13* 13*   < > 11*  --    < > 18*   IMM GRAN % %  --  0.4  --  0.2  --   --   --   --   --   --  0.3  --   --   --    NEUTROS ABS 10*3/mm3 1.19* 1.10* 1.09* 1.79 2.07 4.67  --  2.40 3.13   < > 1.33* 2.43   < > 3.09   LYMPHS ABS 10*3/mm3  --  1.32 1.81 2.14 2.68 4.05*  --   --  3.01   < > 1.96 2.05  --   --    MONOS ABS 10*3/mm3  --  0.27 0.44 0.36 0.32 0.84  --   --  0.53   < > 0.37 0.21*  --   --    EOS ABS 10*3/mm3 0.11 0.04 0.06 0.05 0.03 0.06  --   --  0.00   < > 0.03 0.04   < >  --    EOSINOPHIL ABS %  --   --   --   --   --   --   --   --   --   --   --  0.9  --   --    BASOS ABS 10*3/mm3  --  0.00 0.00 0.01 0.01 0.01  --   --  0.01   < > 0.01  --   --   --    IMMATURE GRANS (ABS) " 10*3/mm3  --  0.01  --  0.01  --   --   --   --   --   --  0.01  --   --   --    NRBC /100 WBC  --  0.0  --  0.0  --   --   --   --   --   --  0.5*  --   --   --    NEUTROPHIL % % 33.0*  --   --   --   --   --   --  24.5*  --   --   --   --   --  56.0   MONOCYTES % % 9.0  --   --   --   --   --   --  4.1*  --   --   --  4.3  --  7.0   ANISOCYTOSIS  Mod/2+  --   --   --   --  Slight/1+  --  Slight/1+  --   --   --  3+  --  Slight/1+    < > = values in this interval not displayed.       Lab Results - Last 18 Months   Lab Units 11/09/21  1041 11/08/21  0915 11/05/21  0914 11/01/21  0806 10/27/21  1248 10/23/21  1012   BUN mg/dL 7* 8 7* 6* 9 12   CREATININE mg/dL 0.46* 0.44* 0.55* 0.48* 0.51* 0.49*   GLUCOSE mg/dL 101* 142* 103* 119* 111* 92   SODIUM mmol/L 137 137 139 139 141 141   POTASSIUM mmol/L 3.8 3.6 3.9 3.4* 3.4* 3.1*   CO2 mmol/L 26.0 25.0 26.0 26.0 26.0 30.0*   CHLORIDE mmol/L 102 103 103 104 104 102   ANION GAP mmol/L 9.0 9.0 10.0 9.0 11.0 9.0   BUN / CREAT RATIO  15.2 18.2 12.7 12.5 17.6 24.5   CALCIUM mg/dL 9.2 9.2 9.0 9.3 9.2 8.6   EGFR IF NONAFRICN AM mL/min/1.73 137 144 111 130 121 127   ALK PHOS U/L 75 69 70 64 57 57   TOTAL PROTEIN g/dL 7.5 7.2 7.2 7.1 7.4 6.6   ALT (SGPT) U/L 35* 37* 29 27 33 52*   AST (SGOT) U/L 24 29 23 21 20 18   BILIRUBIN mg/dL 0.7 0.7 0.7 0.7 1.1 1.2   ALBUMIN g/dL 4.60 4.40 4.30 4.50 4.40 4.00   GLOBULIN gm/dL 2.9 2.8 2.9 2.6 3.0 2.6       Lab Results - Last 18 Months   Lab Units 10/21/21  0800 10/13/21  1008 09/27/21  0942   LDH U/L 270* 244* 280*       Lab Results - Last 18 Months   Lab Units 10/18/21  1018 10/13/21  1151 10/13/21  1008 10/01/21  1116 09/27/21  0942   IRON mcg/dL 230*  --   --  163 141   TIBC mcg/dL 282*  --   --  222* 280*   IRON SATURATION % 82*  --   --  73 50   FERRITIN ng/mL 872.00*  --   --  418* 472.40*   VITAMIN B 12 pg/mL 665  --  753  --  534   FOLATE ng/mL 17.10  --  16.6  --  >20.00   TSH mcunit/mL  --  1.291  --   --   --        CT Head Without  Contrast    Result Date: 10/23/2021   1.  No acute intracranial finding. 2.  Mild paranasal sinus mucosal thickening. 3.  Chronic microvascular ischemic white matter change. This report was finalized on 10/23/2021 11:54 by Dr. Justo Chaves MD.    CT Angiogram Abdomen Pelvis    Result Date: 10/23/2021   1.  No major arterial occlusion or flow-limiting stenosis. Nonaneurysmal abdominal aorta with heavy atherosclerotic calcification. 2.  2.6 cm indeterminate RIGHT adnexal lesion. Recommend correlation with pelvic ultrasound. This report was finalized on 10/23/2021 13:17 by Dr. Justo Chaves MD.    CT Angiogram Chest    Result Date: 10/23/2021   No evidence of pulmonary embolus. No acute findings. This report was finalized on 10/23/2021 13:12 by Dr. Justo Chaves MD.      ASSESSMENT:   PNH, status post first dose of Ultomiris yesterday.    Stable hemoglobin at 7.3.  The patient appears to be tolerating the anemia relatively well.    Thrombocytopenia, it appears to be immune mediated.  She did not respond to platelet transfusions. Today the platelet count was minimally improved to 8, compared to 6 yesterday.    PLAN/RECOMMENDATIONS:   VST 11/12/2021, labs 1h PTV.    DX/ORDERS:   Diagnoses and all orders for this visit:    1. PNH (paroxysmal nocturnal hemoglobinuria) (HCC) (Primary)  -     CBC Auto Differential; Future  -     Comprehensive Metabolic Panel; Future  -     Lactate Dehydrogenase; Future  -     Haptoglobin; Future        Future Appointments   Date Time Provider Department Center   11/12/2021 12:00 PM  [facility]  [facility]   11/12/2021  1:00 PM Devante Galvez MD [facility]   11/22/2021 11:35 AM  [facility]  [facility]   11/22/2021 12:15 PM CHAIR 11 [facility]  [facility]   11/23/2021  1:00 PM CHAIR 15  [facility] INFU [facility]C  [facility]   12/21/2021  1:00 PM CHAIR 13  [facility] OP INFU ONC  [facility]   1/17/2022 11:45 AM  [facility]  [facility]  [facility]   1/17/2022 12:15 PM CHAIR 12  [facility] OP INFU ONC  [facility]        Devante Galvez MD  11/9/2021    cc: No ref. provider found, Faith Alcaraz, APRN

## 2021-11-12 ENCOUNTER — TELEPHONE (OUTPATIENT)
Dept: ONCOLOGY | Facility: CLINIC | Age: 66
End: 2021-11-12

## 2021-11-12 ENCOUNTER — LAB (OUTPATIENT)
Dept: LAB | Facility: HOSPITAL | Age: 66
End: 2021-11-12

## 2021-11-12 ENCOUNTER — OFFICE VISIT (OUTPATIENT)
Dept: ONCOLOGY | Facility: CLINIC | Age: 66
End: 2021-11-12

## 2021-11-12 ENCOUNTER — INFUSION (OUTPATIENT)
Dept: ONCOLOGY | Facility: HOSPITAL | Age: 66
End: 2021-11-12

## 2021-11-12 VITALS
DIASTOLIC BLOOD PRESSURE: 70 MMHG | TEMPERATURE: 96.4 F | RESPIRATION RATE: 16 BRPM | OXYGEN SATURATION: 98 % | WEIGHT: 140.4 LBS | SYSTOLIC BLOOD PRESSURE: 126 MMHG | BODY MASS INDEX: 26.51 KG/M2 | HEIGHT: 61 IN | HEART RATE: 97 BPM

## 2021-11-12 VITALS
HEART RATE: 88 BPM | SYSTOLIC BLOOD PRESSURE: 134 MMHG | TEMPERATURE: 98.4 F | DIASTOLIC BLOOD PRESSURE: 48 MMHG | WEIGHT: 140 LBS | OXYGEN SATURATION: 99 % | HEIGHT: 61 IN | RESPIRATION RATE: 18 BRPM | BODY MASS INDEX: 26.43 KG/M2

## 2021-11-12 DIAGNOSIS — R06.09 DOE (DYSPNEA ON EXERTION): ICD-10-CM

## 2021-11-12 DIAGNOSIS — D59.5 PNH (PAROXYSMAL NOCTURNAL HEMOGLOBINURIA) (HCC): ICD-10-CM

## 2021-11-12 DIAGNOSIS — D59.5 PNH (PAROXYSMAL NOCTURNAL HEMOGLOBINURIA) (HCC): Primary | ICD-10-CM

## 2021-11-12 LAB
ABO GROUP BLD: NORMAL
ALBUMIN SERPL-MCNC: 4.5 G/DL (ref 3.5–5.2)
ALBUMIN/GLOB SERPL: 1.6 G/DL
ALP SERPL-CCNC: 73 U/L (ref 39–117)
ALT SERPL W P-5'-P-CCNC: 29 U/L (ref 1–33)
ANION GAP SERPL CALCULATED.3IONS-SCNC: 13 MMOL/L (ref 5–15)
ANTI-D: NORMAL
ANTI-E: NORMAL
AST SERPL-CCNC: 22 U/L (ref 1–32)
BASOPHILS # BLD AUTO: 0.01 10*3/MM3 (ref 0–0.2)
BASOPHILS NFR BLD AUTO: 0.3 % (ref 0–1.5)
BILIRUB SERPL-MCNC: 0.7 MG/DL (ref 0–1.2)
BLD GP AB SCN SERPL QL: POSITIVE
BUN SERPL-MCNC: 9 MG/DL (ref 8–23)
BUN/CREAT SERPL: 16.1 (ref 7–25)
CALCIUM SPEC-SCNC: 8.9 MG/DL (ref 8.6–10.5)
CHLORIDE SERPL-SCNC: 101 MMOL/L (ref 98–107)
CO2 SERPL-SCNC: 23 MMOL/L (ref 22–29)
CREAT SERPL-MCNC: 0.56 MG/DL (ref 0.57–1)
DEPRECATED RDW RBC AUTO: 67 FL (ref 37–54)
EOSINOPHIL # BLD AUTO: 0.03 10*3/MM3 (ref 0–0.4)
EOSINOPHIL NFR BLD AUTO: 0.9 % (ref 0.3–6.2)
ERYTHROCYTE [DISTWIDTH] IN BLOOD BY AUTOMATED COUNT: 20.6 % (ref 12.3–15.4)
GFR SERPL CREATININE-BSD FRML MDRD: 109 ML/MIN/1.73
GLOBULIN UR ELPH-MCNC: 2.8 GM/DL
GLUCOSE SERPL-MCNC: 128 MG/DL (ref 65–99)
HAPTOGLOB SERPL-MCNC: <10 MG/DL (ref 30–200)
HCT VFR BLD AUTO: 20.7 % (ref 34–46.6)
HGB BLD-MCNC: 6.9 G/DL (ref 12–15.9)
IMM GRANULOCYTES # BLD AUTO: 0.01 10*3/MM3 (ref 0–0.05)
IMM GRANULOCYTES NFR BLD AUTO: 0.3 % (ref 0–0.5)
LDH SERPL-CCNC: 247 U/L (ref 135–214)
LYMPHOCYTES # BLD AUTO: 1.77 10*3/MM3 (ref 0.7–3.1)
LYMPHOCYTES NFR BLD AUTO: 51.3 % (ref 19.6–45.3)
MCH RBC QN AUTO: 32.1 PG (ref 26.6–33)
MCHC RBC AUTO-ENTMCNC: 33.3 G/DL (ref 31.5–35.7)
MCV RBC AUTO: 96.3 FL (ref 79–97)
MONOCYTES # BLD AUTO: 0.34 10*3/MM3 (ref 0.1–0.9)
MONOCYTES NFR BLD AUTO: 9.9 % (ref 5–12)
NEUTROPHILS NFR BLD AUTO: 1.29 10*3/MM3 (ref 1.7–7)
NEUTROPHILS NFR BLD AUTO: 37.3 % (ref 42.7–76)
NRBC BLD AUTO-RTO: 0 /100 WBC (ref 0–0.2)
PLATELET # BLD AUTO: 8 10*3/MM3 (ref 140–450)
PMV BLD AUTO: 11.1 FL (ref 6–12)
POTASSIUM SERPL-SCNC: 3.5 MMOL/L (ref 3.5–5.2)
PROT SERPL-MCNC: 7.3 G/DL (ref 6–8.5)
RBC # BLD AUTO: 2.15 10*6/MM3 (ref 3.77–5.28)
RH BLD: NEGATIVE
SODIUM SERPL-SCNC: 137 MMOL/L (ref 136–145)
T&S EXPIRATION DATE: NORMAL
VWF CP ACT/NOR PPP CHRO: >100 %
WBC # BLD AUTO: 3.45 10*3/MM3 (ref 3.4–10.8)

## 2021-11-12 PROCEDURE — 96375 TX/PRO/DX INJ NEW DRUG ADDON: CPT

## 2021-11-12 PROCEDURE — 85025 COMPLETE CBC W/AUTO DIFF WBC: CPT

## 2021-11-12 PROCEDURE — 86900 BLOOD TYPING SEROLOGIC ABO: CPT

## 2021-11-12 PROCEDURE — 36415 COLL VENOUS BLD VENIPUNCTURE: CPT

## 2021-11-12 PROCEDURE — 83010 ASSAY OF HAPTOGLOBIN QUANT: CPT

## 2021-11-12 PROCEDURE — 86901 BLOOD TYPING SEROLOGIC RH(D): CPT

## 2021-11-12 PROCEDURE — P9016 RBC LEUKOCYTES REDUCED: HCPCS

## 2021-11-12 PROCEDURE — 86920 COMPATIBILITY TEST SPIN: CPT

## 2021-11-12 PROCEDURE — 96374 THER/PROPH/DIAG INJ IV PUSH: CPT

## 2021-11-12 PROCEDURE — 25010000002 HYDROCORTISONE SODIUM SUCCINATE 100 MG RECONSTITUTED SOLUTION: Performed by: INTERNAL MEDICINE

## 2021-11-12 PROCEDURE — 99214 OFFICE O/P EST MOD 30 MIN: CPT | Performed by: INTERNAL MEDICINE

## 2021-11-12 PROCEDURE — 25010000002 DIPHENHYDRAMINE PER 50 MG: Performed by: INTERNAL MEDICINE

## 2021-11-12 PROCEDURE — 86922 COMPATIBILITY TEST ANTIGLOB: CPT

## 2021-11-12 PROCEDURE — 86902 BLOOD TYPE ANTIGEN DONOR EA: CPT

## 2021-11-12 PROCEDURE — 83615 LACTATE (LD) (LDH) ENZYME: CPT

## 2021-11-12 PROCEDURE — 86850 RBC ANTIBODY SCREEN: CPT

## 2021-11-12 PROCEDURE — 86870 RBC ANTIBODY IDENTIFICATION: CPT

## 2021-11-12 PROCEDURE — 36430 TRANSFUSION BLD/BLD COMPNT: CPT

## 2021-11-12 PROCEDURE — 80053 COMPREHEN METABOLIC PANEL: CPT

## 2021-11-12 RX ORDER — DIPHENHYDRAMINE HYDROCHLORIDE 50 MG/ML
25 INJECTION INTRAMUSCULAR; INTRAVENOUS ONCE
Status: CANCELLED | OUTPATIENT
Start: 2021-11-12 | End: 2021-11-12

## 2021-11-12 RX ORDER — DIPHENHYDRAMINE HYDROCHLORIDE 50 MG/ML
25 INJECTION INTRAMUSCULAR; INTRAVENOUS ONCE
Status: COMPLETED | OUTPATIENT
Start: 2021-11-12 | End: 2021-11-12

## 2021-11-12 RX ORDER — FAMOTIDINE 10 MG/ML
20 INJECTION, SOLUTION INTRAVENOUS ONCE
Status: CANCELLED | OUTPATIENT
Start: 2021-11-12 | End: 2021-11-12

## 2021-11-12 RX ORDER — FAMOTIDINE 10 MG/ML
20 INJECTION, SOLUTION INTRAVENOUS ONCE
Status: COMPLETED | OUTPATIENT
Start: 2021-11-12 | End: 2021-11-12

## 2021-11-12 RX ORDER — SODIUM CHLORIDE 9 MG/ML
250 INJECTION, SOLUTION INTRAVENOUS AS NEEDED
Status: CANCELLED | OUTPATIENT
Start: 2021-11-12

## 2021-11-12 RX ORDER — ACETAMINOPHEN 325 MG/1
650 TABLET ORAL ONCE
Status: COMPLETED | OUTPATIENT
Start: 2021-11-12 | End: 2021-11-12

## 2021-11-12 RX ORDER — ACETAMINOPHEN 325 MG/1
650 TABLET ORAL ONCE
Status: CANCELLED | OUTPATIENT
Start: 2021-11-12 | End: 2021-11-12

## 2021-11-12 RX ORDER — SODIUM CHLORIDE 9 MG/ML
250 INJECTION, SOLUTION INTRAVENOUS AS NEEDED
Status: DISCONTINUED | OUTPATIENT
Start: 2021-11-12 | End: 2021-11-12 | Stop reason: HOSPADM

## 2021-11-12 RX ADMIN — ACETAMINOPHEN 650 MG: 325 TABLET, FILM COATED ORAL at 13:16

## 2021-11-12 RX ADMIN — FAMOTIDINE 20 MG: 10 INJECTION INTRAVENOUS at 13:16

## 2021-11-12 RX ADMIN — SODIUM CHLORIDE 250 ML: 9 INJECTION, SOLUTION INTRAVENOUS at 13:10

## 2021-11-12 RX ADMIN — DIPHENHYDRAMINE HYDROCHLORIDE 25 MG: 50 INJECTION, SOLUTION INTRAMUSCULAR; INTRAVENOUS at 13:18

## 2021-11-12 RX ADMIN — HYDROCORTISONE SODIUM SUCCINATE 100 MG: 100 INJECTION, POWDER, FOR SOLUTION INTRAMUSCULAR; INTRAVENOUS at 13:23

## 2021-11-12 NOTE — TELEPHONE ENCOUNTER
"Received call from Belem WASHINGTON [facility]. She calls to report that she had received a phone call from the [facility] and patient Ratna Cohen has \"DEVELOPED A ANTIBODY\" the [facility] feels that today's kenia blood transfusion for unit of PRBC can be processed and ready for transfusion early this afternoon. But going forward it would be best to have patient come in a day prior to visit or possible transfusion so the blood can be processed and ready for transfusion due time constraints, v/u.    Notified Becki COTTON and message was sent to Dr Galvez    "

## 2021-11-12 NOTE — TELEPHONE ENCOUNTER
11/12/21 @ 1:50 PM: contacted [facility]  regarding planned transfusion of 1 unit PRBC on Monday 11/15/21 per Dr Galvez, spoke with Kwan, she asked that Transfusion order be placed today 11/12/21 and they will have the unit prepped for transfusion, patient will not require T&C prior to transfusion, her current banding will be good until 12 o'clock Monday nite 11/15/21  Notified Bharati WALLACE Nurse to please have patient keep her bracelet on so she will not have to have to be T&C prior to Transfusion.     Spoke with Callie aquino apt time: Monday 11/15/21 @ 10:15 am, Bharati WASHINGTON notified

## 2021-11-12 NOTE — PROGRESS NOTES
[facility] ON[facility]  [address]  [place] [address]  [phone]       11/12/2021     PROGRESS NOTE     PATIENT NAME: Ratna Cohen  YOB: 1955  66 y.o. MR: [id number]    PCP: Faith Alcaraz APRN    HPI:   Ms. Ratna Cohen complains of severe fatigue, exertional dyspnea.  She denies evidence of bleeding.  We reviewed the labs and she has worse anemia today.  We decided to proceed with PRBCs.  So far she is not aware of any side effects of the Ultomiris that she received earlier this week.    The  came with her as usual.  We discussed that the diagnosis of PNH seems the most likely diagnosis to me but since yet there is no response to the therapy, I cannot be certain of the diagnosis.  We also discussed that the clone size of the affected cells in the peripheral blood was small and hence it provides less certainty of the diagnosis.    We discussed alternative diagnosis such as TTP although she does not have clinical presentation of TTP since she has had symptoms of anemia probably starting last year and also lacks several other features of TTP, including that there was no evidence of microangiopathy on the blood smear.    The patient is anticipating follow-up appointment at [facility] next week.  I encouraged the patient to proceed with that appointment.    We also discussed that the patient has developed alloantibodies from the multiple prior RBC transfusions.  Because she did not respond to platelet transfusions, I recommended to withhold platelet transfusions for now.    PAST HISTORY:     HEME/ONC HISTORY  Oncology/Hematology History Overview Note     HEME/ONC DX/RX/INVESTIGATIONS SUMMARY:   DX:   PNH (minimal neutropenia, severe anemia, severe thrombocytopenia).   Unremarkable BMBX on 10/4/2021. Anemic symptoms started in 2020, petechiae started in 6/2021.  Anti-E RBC alloantibody  positive (after multiple PRBCs)  Anti-D RBC alloantibody positive (after multiple PRBCs). Patient is A-.    10/21/2021, iron overload probably 2nd PRBCs.    10/23/2021, CT abd, undetermined 2.6 cm R adnexal lesion, needs further eval. when patient is more stable.    RX:   9/21/2021, 2U PRBCs and 2 platelet transfusions.  10/15/2021, 1U PRBC.  Prednisone: 10/15/2021, start 80 mg QD. 10/22/2021, DC prednisone.  10/22/2021, Meningococcus vaccines 1st dose (Bexsero and Menactra)  11/8/2021, Ultomiris, 1st dose (loading dose).  11/29/2021 (anticipated), Ultomiris Q8wks, C1.  11/8/20201, Cipro 500 mg PO BID x 14 days (prophylactic b/o Ultomiris).    OTHER INFO:   No prior hematological history or significant medical history until 9/21/2021.    Stress test was positive during admission at [facility] in 9/21/2021, cardiac cath not possible because of thrombocytopenia.  Patient never had chest pain, only dyspnea and weakness.    INVESTIGATIONS:   9/21/2021-9:48 AM, WBC 3.4 hemoglobin 7.7 .8 platelets 12 neutrophils 1.1 lymphocytes 1.8.  9/21/2021-17:20 PM, WBC 3.9 hemoglobin 8.3 .1 platelets 23.  9/22/2021, folic acid 25.9 ferritin 365 B12 368 HBsAg negative, HIV negative, hep C antibody negative reticulocyte 4.0%, PT 10.7, INR 1.0 PTT 25.3. Fecal occult blood negative.  9/22/2021, 1:37 AM, WBC 3.6 hemoglobin 7.0 .5 platelets 11 neutrophils 1.06 lymphocytes 2.19.  9/23/2021, WBC 4.0 hemoglobin 10.4 .1 platelets 12.  Neutrophils 1.38 lymphocytes 2.0. BUN 11 creatinine 0.47 calcium 8.5 bilirubin 0.7 ALT 31 AST 26 alk phos 68 albumin 3.7 total protein 7.1.  10/4/201, Diagnosis: BONE MARROW - PERIPHERAL BLOOD SMEAR, ASPIRATE SMEAR, PARTICLE PREPARATION, BIOPSY AND FLOW CYTOMETRY: NORMOCELLULAR MARROW WITH MATURING TRILINEAGE HEMATOPOIESIS, ERYTHROID ATYPIA, AND NO INCREASE IN BLASTS; SEE IMPRESSION.   10/4/2021, Myeloid NGS Panel RESULTS SUMMARY: (Final).Inconclusive: A variant of  uncertain significance was detected in this patient's sample.   10/4/2021, PERIPHERAL BLOOD SMEAR ([place]):   CBC : WBC 4.6 k/microL, Hgb 9.5 g/dL, Plt-Ct 13 k/microL,  fL, RDW 20.8%.   A Pitt's stained peripheral smear is reviewed. Erythrocytes are decreased and are macrocytic and normochromic with anisopoikilocytosis. Polychromasia is present. Neutrophils are adequate in number, and demonstrate no significant morphologic abnormalities. Lymphocytes are adequate in number, and demonstrate no significant morphologic abnormalities. There are no circulating blasts, plasma cells, or abnormal lymphocytes. Platelets are decreased in number, and demonstrate no significant morphologic abnormalities.     10/13/2021, EPO 1,966.  Haptoglobin < 8.  .  Reticulocyte 4%.  10/15/2021, WBC 3.71 hemoglobin 7.6 .3 platelets 11.  10/18/2021, MARTA negative.    10/22/2021, PNH profile:  Comment: Peripheral Blood:   Glycosylphosphatidylinositol (GPI) anchor deficient cells detected (4.48% of granulocytes, 5.93% of monocytes, 0.07% of red blood cells have features of type II red blood cells, 0.50% of red blood cells have features of type III red blood cells).  10/23/2021, CT chest angio:  No evidence of pulmonary embolus. No acute findings.    10/23/2021, CT abd angio:  1.  No major arterial occlusion or flow-limiting stenosis. Nonaneurysmal  abdominal aorta with heavy atherosclerotic calcification.  2.  2.6 cm indeterminate RIGHT adnexal lesion. Recommend correlation  with pelvic ultrasound.    11/8/2021, type & screen, anti-E antibody  11/12/2021, type & screen, anti-E antibody and anti-D antibody         MEDICAL HISTORY   has a past medical history of Acid reflux, Anemia, and PNH (paroxysmal nocturnal hemoglobinuria) (HCC).   HEALTH MAINTENANCE ITEMS  Health Maintenance Due   Topic Date Due   • COLORECTAL CANCER SCREENING  Never done   • ANNUAL PHYSICAL  Never done   • TDAP/TD VACCINES (1 - Tdap) Never done   •  "ZOSTER VACCINE (1 of 2) Never done   • INFLUENZA VACCINE  Never done   • HEPATITIS C SCREENING  Never done     <no information>  Last Completed Colonoscopy     This patient has no relevant Health Maintenance data.        Immunization History   Administered Date(s) Administered   • COVID-19 (MODERNA) 03/01/2021, 03/31/2021   • Hepatitis B Vaccine Adult IM 05/10/2007   • Meningococcal B,(Bexsero) 10/22/2021   • Meningococcal MCV4P (Menactra) 10/22/2021     Last Completed Mammogram          MAMMOGRAM (Every 2 Years) Next due on 9/1/2023 09/01/2021  Outside Procedure: HC MAMMOGRAM SCREENING BILAT DIGITAL W CAD              FAMILY HISTORY  Family History   Problem Relation Age of Onset   • Diabetes Mother    • Hypertension Mother    • Heart attack Mother    • Hypertension Father    • Heart attack Father        Cancer-related family history is not on file.   SURGICAL HISTORY   has a past surgical history that includes Appendectomy.  SOCIAL HISTORY  Social History     Socioeconomic History   • Marital status:    Tobacco Use   • Smoking status: Never Smoker   • Smokeless tobacco: Never Used   Vaping Use   • Vaping Use: Never used   Substance and Sexual Activity   • Alcohol use: Not Currently   • Drug use: Never   • Sexual activity: Yes     MEDICATIONS:     Current Outpatient Medications   Medication Instructions   • ciprofloxacin (CIPRO) 500 mg, Oral, 2 Times Daily   • pantoprazole (PROTONIX) 20 mg, Oral, Daily   • potassium chloride (K-DUR,KLOR-CON) 20 MEQ CR tablet 20 mEq, Oral, 2 Times Daily      ALLERGIES:   No Known Allergies  ROS:   Pertinent positive and negative findings as noted in HPI.   PHYSICAL EXAM:   /70   Pulse 97   Temp 96.4 °F (35.8 °C)   Resp 16   Ht 154.9 cm (61\")   Wt 63.7 kg (140 lb 6.4 oz)   SpO2 98%   Breastfeeding No   BMI 26.53 kg/m²  Body surface area is 1.63 meters squared.   Pain Score    11/12/21 0928   PainSc: 0-No pain     Alert, oriented x3, cooperative, not in " distress.  HEENT: Normocephalic, wearing a facemask.  Lungs: No tachypnea.   Extremities: No ankle edema.  Neurologic: Moves all extremities.    INVESTIGATIONS/LABS:     Lab Results - Last 18 Months   Lab Units 11/12/21  0918 11/09/21  1041 11/08/21  0915 11/05/21  0914 11/01/21  0806 10/27/21  1248 10/23/21  1012 10/23/21  1012 10/21/21  0800 10/21/21  0800 10/18/21  1018 10/15/21  1015 10/13/21  1151 09/27/21  0942 09/27/21  0942   WBC 10*3/mm3 3.45 3.61 2.74* 3.40 4.36 5.13   < > 9.69   < > 8.71   < > 3.71  --    < > 5.24   HEMOGLOBIN g/dL 6.9* 7.3* 7.3* 8.1* 7.4* 8.2*   < > 8.9*   < > 9.3*   < > 7.6*  --    < > 9.3*   HEMATOCRIT % 20.7* 21.3* 20.9* 24.1* 21.2* 24.5*   < > 26.5*   < > 26.4*   < > 22.4*  --    < > 27.4*   MCV fL 96.3 96.8 95.9 95.6 100.0* 99.6*   < > 98.9*   < > 98.1*   < > 102.3*  --    < > 101.1*   PLATELETS 10*3/mm3 8* 8* 6* 7* 10* 12*   < > 13*   < > 13*   < > 11*  --    < > 18*   IMM GRAN % % 0.3  --  0.4  --  0.2  --   --   --   --   --   --  0.3  --   --   --    NEUTROS ABS 10*3/mm3 1.29* 1.19* 1.10* 1.09* 1.79 2.07   < > 4.67  --  2.40   < > 1.33* 2.43   < > 3.09   LYMPHS ABS 10*3/mm3 1.77  --  1.32 1.81 2.14 2.68  --  4.05*  --   --    < > 1.96 2.05  --   --    MONOS ABS 10*3/mm3 0.34  --  0.27 0.44 0.36 0.32  --  0.84  --   --    < > 0.37 0.21*  --   --    EOS ABS 10*3/mm3 0.03 0.11 0.04 0.06 0.05 0.03   < > 0.06  --   --    < > 0.03 0.04  --   --    EOSINOPHIL ABS %  --   --   --   --   --   --   --   --   --   --   --   --  0.9  --   --    BASOS ABS 10*3/mm3 0.01  --  0.00 0.00 0.01 0.01  --  0.01  --   --    < > 0.01  --   --   --    IMMATURE GRANS (ABS) 10*3/mm3 0.01  --  0.01  --  0.01  --   --   --   --   --   --  0.01  --   --   --    NRBC /100 WBC 0.0  --  0.0  --  0.0  --   --   --   --   --   --  0.5*  --   --   --    NEUTROPHIL % %  --  33.0*  --   --   --   --   --   --   --  24.5*  --   --   --   --  56.0   MONOCYTES % %  --  9.0  --   --   --   --   --   --   --  4.1*  --    --  4.3  --  7.0   ANISOCYTOSIS   --  Mod/2+  --   --   --   --   --  Slight/1+  --  Slight/1+  --   --  3+  --  Slight/1+    < > = values in this interval not displayed.       Lab Results - Last 18 Months   Lab Units 11/12/21  0918 11/09/21  1041 11/08/21  0915 11/05/21  0914 11/01/21  0806 10/27/21  1248   BUN mg/dL 9 7* 8 7* 6* 9   CREATININE mg/dL 0.56* 0.46* 0.44* 0.55* 0.48* 0.51*   GLUCOSE mg/dL 128* 101* 142* 103* 119* 111*   SODIUM mmol/L 137 137 137 139 139 141   POTASSIUM mmol/L 3.5 3.8 3.6 3.9 3.4* 3.4*   CO2 mmol/L 23.0 26.0 25.0 26.0 26.0 26.0   CHLORIDE mmol/L 101 102 103 103 104 104   ANION GAP mmol/L 13.0 9.0 9.0 10.0 9.0 11.0   BUN / CREAT RATIO  16.1 15.2 18.2 12.7 12.5 17.6   CALCIUM mg/dL 8.9 9.2 9.2 9.0 9.3 9.2   EGFR IF NONAFRICN AM mL/min/1.73 109 137 144 111 130 121   ALK PHOS U/L 73 75 69 70 64 57   TOTAL PROTEIN g/dL 7.3 7.5 7.2 7.2 7.1 7.4   ALT (SGPT) U/L 29 35* 37* 29 27 33   AST (SGOT) U/L 22 24 29 23 21 20   BILIRUBIN mg/dL 0.7 0.7 0.7 0.7 0.7 1.1   ALBUMIN g/dL 4.50 4.60 4.40 4.30 4.50 4.40   GLOBULIN gm/dL 2.8 2.9 2.8 2.9 2.6 3.0       Lab Results - Last 18 Months   Lab Units 11/12/21  0918 10/21/21  0800 10/13/21  1008 09/27/21  0942   LDH U/L 247* 270* 244* 280*       Lab Results - Last 18 Months   Lab Units 10/18/21  1018 10/13/21  1151 10/13/21  1008 10/01/21  1116 09/27/21  0942   IRON mcg/dL 230*  --   --  163 141   TIBC mcg/dL 282*  --   --  222* 280*   IRON SATURATION % 82*  --   --  73 50   FERRITIN ng/mL 872.00*  --   --  418* 472.40*   VITAMIN B 12 pg/mL 665  --  753  --  534   FOLATE ng/mL 17.10  --  16.6  --  >20.00   TSH mcunit/mL  --  1.291  --   --   --        CT Head Without Contrast    Result Date: 10/23/2021   1.  No acute intracranial finding. 2.  Mild paranasal sinus mucosal thickening. 3.  Chronic microvascular ischemic white matter change. This report was finalized on 10/23/2021 11:54 by Dr. Justo Chaves MD.    CT Angiogram Abdomen Pelvis    Result Date:  10/23/2021   1.  No major arterial occlusion or flow-limiting stenosis. Nonaneurysmal abdominal aorta with heavy atherosclerotic calcification. 2.  2.6 cm indeterminate RIGHT adnexal lesion. Recommend correlation with pelvic ultrasound. This report was finalized on 10/23/2021 13:17 by Dr. Justo Chaves MD.    CT Angiogram Chest    Result Date: 10/23/2021   No evidence of pulmonary embolus. No acute findings. This report was finalized on 10/23/2021 13:12 by Dr. Justo Chaves MD.      ASSESSMENT:   PNH, status post first infusion with Ultomiris on 11/8/2021.    Worsening of anemia secondary to hemolysis.  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  She developed RBC alloantibodies from the multiple transfusions.  The hemoglobin today was 6.9.    Severe thrombocytopenia without evidence of bleeding. She has not responded to platelet transfusions and did not appear to have responded to high dose prednisone for approx. 6 days.    Today we discussed that the percentage of PNH cells in the peripheral blood was small, raising the possibility of alternative diagnosis. We discussed alternative diagnoses of Gia negative hemolytic anemia and thrombocytopenia. We discussed possible TTP but that also there was no evidence of microangiopathy on prior blood smears.  [id number] levels are pending.    PLAN/RECOMMENDATIONS:   Transfuse 1 Unit of PRBC today.    VST 11/15/2021, labs 1h PTV.    DX/ORDERS:   Diagnoses and all orders for this visit:    1. PNH (paroxysmal nocturnal hemoglobinuria) (HCC) (Primary)  -     Type & Screen; Future  -     CBC Auto Differential; Future  -     Reticulocytes; Future  -     Type & Screen; Future  -     Lactate Dehydrogenase; Future  -     Haptoglobin; Future  -     Comprehensive Metabolic Panel; Future        Future Appointments   Date Time Provider Department Center   11/15/2021  8:45 AM [facility] CANCER CTR LAB [facility] CCL[facility]   11/15/2021  9:45 AM Devante Galvez MD [facility] ON[facility]    11/15/2021 10:15 AM CHAIR 04  PAD OP INFU ONC  PAD OIONC PAD   11/22/2021 11:35 AM  [facility] CANCER CTR LAB  PAD CCLAB PAD   11/22/2021 12:15 PM CHAIR 11  PAD OP INFU ONC  PAD OIONC PAD   11/23/2021  1:00 PM CHAIR 15  PAD OP INFU ONC  PAD OIONC PAD   12/21/2021  1:00 PM CHAIR 13  PAD OP INFU ONC  PAD OIONC PAD   1/17/2022 11:45 AM  PAD CANCER CTR LAB  PAD CCLAB PAD   1/17/2022 12:15 PM CHAIR 12  PAD OP INFU ONC  PAD OIONC PAD        Devante Galvez MD  11/12/2021    cc:  Faith Alcaraz APRN

## 2021-11-12 NOTE — PROGRESS NOTES
S/W Dr. Galvez/Donna r/T Hgb 6.9  and Plts 8.   To receive blood today, will put in orders. JAZLYN Ramires

## 2021-11-12 NOTE — PROGRESS NOTES
1050 Arlene with blood bank called patient has developed and antibody, will call when gets blood ready. Spoke with Donna Rendon LPN with Dr. Galvez to let him know, may need to bring patient in day before if anticipates blood transfusion to give blood bank time to cross match.Mónica RN    1140 Called blood bank found additional antibody, may take 24 hours or more to find blood, wait 1 hour call back should know if available within the next few hours or need to reschedule.Mónica RN

## 2021-11-12 NOTE — PATIENT INSTRUCTIONS
Keep red blood bank bracelet on for Monday.  Blood Transfusion, Adult, Care After  This sheet gives you information about how to care for yourself after your procedure. Your doctor may also give you more specific instructions. If you have problems or questions, contact your doctor.  What can I expect after the procedure?  After the procedure, it is common to have:  · Bruising and soreness at the IV site.  · A fever or chills on the day of the procedure. This may be your body's response to the new blood cells received.  · A headache.  Follow these instructions at home:  Insertion site care         · Follow instructions from your doctor about how to take care of your insertion site. This is where an IV tube was put into your vein. Make sure you:  ? Wash your hands with soap and water before and after you change your bandage (dressing). If you cannot use soap and water, use hand .  ? Change your bandage as told by your doctor.  · Check your insertion site every day for signs of infection. Check for:  ? Redness, swelling, or pain.  ? Bleeding from the site.  ? Warmth.  ? Pus or a bad smell.  General instructions  · Take over-the-counter and prescription medicines only as told by your doctor.  · Rest as told by your doctor.  · Go back to your normal activities as told by your doctor.  · Keep all follow-up visits as told by your doctor. This is important.  Contact a doctor if:  · You have itching or red, swollen areas of skin (hives).  · You feel worried or nervous (anxious).  · You feel weak after doing your normal activities.  · You have redness, swelling, warmth, or pain around the insertion site.  · You have blood coming from the insertion site, and the blood does not stop with pressure.  · You have pus or a bad smell coming from the insertion site.  Get help right away if:  · You have signs of a serious reaction. This may be coming from an allergy or the body's defense system (immune system). Signs  include:  ? Trouble breathing or shortness of breath.  ? Swelling of the face or feeling warm (flushed).  ? Fever or chills.  ? Head, chest, or back pain.  ? Dark pee (urine) or blood in the pee.  ? Widespread rash.  ? Fast heartbeat.  ? Feeling dizzy or light-headed.  You may receive your blood transfusion in an outpatient setting. If so, you will be told whom to contact to report any reactions.  These symptoms may be an emergency. Do not wait to see if the symptoms will go away. Get medical help right away. Call your local emergency services (911 in the [place] Do not drive yourself to the hospital.  Summary  · Bruising and soreness at the IV site are common.  · Check your insertion site every day for signs of infection.  · Rest as told by your doctor. Go back to your normal activities as told by your doctor.  · Get help right away if you have signs of a serious reaction.  This information is not intended to replace advice given to you by your health care provider. Make sure you discuss any questions you have with your health care provider.  Document Revised: 06/11/2020 Document Reviewed: 06/11/2020  [employer] Patient Education © 2021 [employer]

## 2021-11-12 NOTE — TELEPHONE ENCOUNTER
TRANSFUSION X 1 UNIT PRBC TO BE TRANSFUSED TODAY 11/12/21  WITH PRE-MED'S  TYLENOL 650 MG PO  BENADRYL 25 MG IV  PEPCID 20 MG IV  SOLU-CORTEF 100 MG IV

## 2021-11-15 ENCOUNTER — LAB (OUTPATIENT)
Dept: LAB | Facility: HOSPITAL | Age: 66
End: 2021-11-15

## 2021-11-15 ENCOUNTER — INFUSION (OUTPATIENT)
Dept: ONCOLOGY | Facility: HOSPITAL | Age: 66
End: 2021-11-15

## 2021-11-15 ENCOUNTER — TELEPHONE (OUTPATIENT)
Dept: ONCOLOGY | Facility: CLINIC | Age: 66
End: 2021-11-15

## 2021-11-15 ENCOUNTER — OFFICE VISIT (OUTPATIENT)
Dept: ONCOLOGY | Facility: CLINIC | Age: 66
End: 2021-11-15

## 2021-11-15 VITALS
DIASTOLIC BLOOD PRESSURE: 68 MMHG | OXYGEN SATURATION: 98 % | TEMPERATURE: 97.1 F | RESPIRATION RATE: 16 BRPM | WEIGHT: 140.9 LBS | HEIGHT: 61 IN | BODY MASS INDEX: 26.6 KG/M2 | HEART RATE: 118 BPM | SYSTOLIC BLOOD PRESSURE: 118 MMHG

## 2021-11-15 VITALS
RESPIRATION RATE: 18 BRPM | TEMPERATURE: 98.6 F | OXYGEN SATURATION: 100 % | SYSTOLIC BLOOD PRESSURE: 105 MMHG | WEIGHT: 139.2 LBS | HEART RATE: 72 BPM | DIASTOLIC BLOOD PRESSURE: 49 MMHG | HEIGHT: 61 IN | BODY MASS INDEX: 26.28 KG/M2

## 2021-11-15 DIAGNOSIS — D59.5 PNH (PAROXYSMAL NOCTURNAL HEMOGLOBINURIA) (HCC): ICD-10-CM

## 2021-11-15 DIAGNOSIS — D59.5 PNH (PAROXYSMAL NOCTURNAL HEMOGLOBINURIA) (HCC): Primary | ICD-10-CM

## 2021-11-15 LAB
ALBUMIN SERPL-MCNC: 4.4 G/DL (ref 3.5–5.2)
ALBUMIN/GLOB SERPL: 1.7 G/DL
ALP SERPL-CCNC: 68 U/L (ref 39–117)
ALT SERPL W P-5'-P-CCNC: 25 U/L (ref 1–33)
ANION GAP SERPL CALCULATED.3IONS-SCNC: 11 MMOL/L (ref 5–15)
AST SERPL-CCNC: 20 U/L (ref 1–32)
BASOPHILS # BLD AUTO: 0 10*3/MM3 (ref 0–0.2)
BASOPHILS NFR BLD AUTO: 0 % (ref 0–1.5)
BILIRUB SERPL-MCNC: 0.6 MG/DL (ref 0–1.2)
BUN SERPL-MCNC: 10 MG/DL (ref 8–23)
BUN/CREAT SERPL: 20 (ref 7–25)
CALCIUM SPEC-SCNC: 8.9 MG/DL (ref 8.6–10.5)
CHLORIDE SERPL-SCNC: 102 MMOL/L (ref 98–107)
CO2 SERPL-SCNC: 24 MMOL/L (ref 22–29)
CREAT SERPL-MCNC: 0.5 MG/DL (ref 0.57–1)
DEPRECATED RDW RBC AUTO: 58.4 FL (ref 37–54)
EOSINOPHIL # BLD AUTO: 0.02 10*3/MM3 (ref 0–0.4)
EOSINOPHIL NFR BLD AUTO: 0.6 % (ref 0.3–6.2)
ERYTHROCYTE [DISTWIDTH] IN BLOOD BY AUTOMATED COUNT: 18.6 % (ref 12.3–15.4)
GFR SERPL CREATININE-BSD FRML MDRD: 124 ML/MIN/1.73
GLOBULIN UR ELPH-MCNC: 2.6 GM/DL
GLUCOSE SERPL-MCNC: 130 MG/DL (ref 65–99)
HAPTOGLOB SERPL-MCNC: 11 MG/DL (ref 30–200)
HCT VFR BLD AUTO: 22.9 % (ref 34–46.6)
HGB BLD-MCNC: 7.9 G/DL (ref 12–15.9)
HOLD SPECIMEN: NORMAL
LDH SERPL-CCNC: 230 U/L (ref 135–214)
LYMPHOCYTES # BLD AUTO: 1.84 10*3/MM3 (ref 0.7–3.1)
LYMPHOCYTES NFR BLD AUTO: 52.9 % (ref 19.6–45.3)
MCH RBC QN AUTO: 32.5 PG (ref 26.6–33)
MCHC RBC AUTO-ENTMCNC: 34.5 G/DL (ref 31.5–35.7)
MCV RBC AUTO: 94.2 FL (ref 79–97)
MONOCYTES # BLD AUTO: 0.32 10*3/MM3 (ref 0.1–0.9)
MONOCYTES NFR BLD AUTO: 9.2 % (ref 5–12)
NEUTROPHILS NFR BLD AUTO: 1.29 10*3/MM3 (ref 1.7–7)
NEUTROPHILS NFR BLD AUTO: 37 % (ref 42.7–76)
PLATELET # BLD AUTO: 4 10*3/MM3 (ref 140–450)
PMV BLD AUTO: ABNORMAL FL
POTASSIUM SERPL-SCNC: 3.7 MMOL/L (ref 3.5–5.2)
PROT SERPL-MCNC: 7 G/DL (ref 6–8.5)
RBC # BLD AUTO: 2.43 10*6/MM3 (ref 3.77–5.28)
RETICS # AUTO: 0.07 10*6/MM3 (ref 0.02–0.13)
RETICS/RBC NFR AUTO: 2.92 % (ref 0.7–1.9)
SODIUM SERPL-SCNC: 137 MMOL/L (ref 136–145)
WBC # BLD AUTO: 3.48 10*3/MM3 (ref 3.4–10.8)

## 2021-11-15 PROCEDURE — 25010000002 DIPHENHYDRAMINE PER 50 MG: Performed by: INTERNAL MEDICINE

## 2021-11-15 PROCEDURE — 36415 COLL VENOUS BLD VENIPUNCTURE: CPT

## 2021-11-15 PROCEDURE — 83615 LACTATE (LD) (LDH) ENZYME: CPT

## 2021-11-15 PROCEDURE — 25010000002 HYDROCORTISONE SODIUM SUCCINATE 100 MG RECONSTITUTED SOLUTION: Performed by: INTERNAL MEDICINE

## 2021-11-15 PROCEDURE — 80053 COMPREHEN METABOLIC PANEL: CPT

## 2021-11-15 PROCEDURE — 83010 ASSAY OF HAPTOGLOBIN QUANT: CPT

## 2021-11-15 PROCEDURE — 96375 TX/PRO/DX INJ NEW DRUG ADDON: CPT

## 2021-11-15 PROCEDURE — 99214 OFFICE O/P EST MOD 30 MIN: CPT | Performed by: INTERNAL MEDICINE

## 2021-11-15 PROCEDURE — 85025 COMPLETE CBC W/AUTO DIFF WBC: CPT

## 2021-11-15 PROCEDURE — 85045 AUTOMATED RETICULOCYTE COUNT: CPT

## 2021-11-15 PROCEDURE — 96374 THER/PROPH/DIAG INJ IV PUSH: CPT

## 2021-11-15 RX ORDER — FAMOTIDINE 10 MG/ML
20 INJECTION, SOLUTION INTRAVENOUS ONCE
Status: CANCELLED | OUTPATIENT
Start: 2021-11-15 | End: 2021-11-15

## 2021-11-15 RX ORDER — ACETAMINOPHEN 325 MG/1
650 TABLET ORAL ONCE
Status: CANCELLED | OUTPATIENT
Start: 2021-11-15 | End: 2021-11-15

## 2021-11-15 RX ORDER — SODIUM CHLORIDE 9 MG/ML
250 INJECTION, SOLUTION INTRAVENOUS AS NEEDED
Status: DISCONTINUED | OUTPATIENT
Start: 2021-11-15 | End: 2021-11-15 | Stop reason: HOSPADM

## 2021-11-15 RX ORDER — SODIUM CHLORIDE 9 MG/ML
250 INJECTION, SOLUTION INTRAVENOUS AS NEEDED
Status: CANCELLED | OUTPATIENT
Start: 2021-11-15

## 2021-11-15 RX ORDER — ACETAMINOPHEN 325 MG/1
650 TABLET ORAL ONCE
Status: COMPLETED | OUTPATIENT
Start: 2021-11-15 | End: 2021-11-15

## 2021-11-15 RX ORDER — DIPHENHYDRAMINE HYDROCHLORIDE 50 MG/ML
25 INJECTION INTRAMUSCULAR; INTRAVENOUS ONCE
Status: COMPLETED | OUTPATIENT
Start: 2021-11-15 | End: 2021-11-15

## 2021-11-15 RX ORDER — DIPHENHYDRAMINE HYDROCHLORIDE 50 MG/ML
25 INJECTION INTRAMUSCULAR; INTRAVENOUS ONCE
Status: CANCELLED | OUTPATIENT
Start: 2021-11-15 | End: 2021-11-15

## 2021-11-15 RX ORDER — FAMOTIDINE 10 MG/ML
20 INJECTION, SOLUTION INTRAVENOUS ONCE
Status: COMPLETED | OUTPATIENT
Start: 2021-11-15 | End: 2021-11-15

## 2021-11-15 RX ADMIN — HYDROCORTISONE SODIUM SUCCINATE 100 MG: 100 INJECTION, POWDER, FOR SOLUTION INTRAMUSCULAR; INTRAVENOUS at 11:42

## 2021-11-15 RX ADMIN — ACETAMINOPHEN 650 MG: 325 TABLET, FILM COATED ORAL at 11:41

## 2021-11-15 RX ADMIN — DIPHENHYDRAMINE HYDROCHLORIDE 25 MG: 50 INJECTION, SOLUTION INTRAMUSCULAR; INTRAVENOUS at 11:44

## 2021-11-15 RX ADMIN — FAMOTIDINE 20 MG: 10 INJECTION INTRAVENOUS at 11:43

## 2021-11-15 NOTE — TELEPHONE ENCOUNTER
CRITICAL LAB VALUE  Received call from [facility] Hematology lab with CRITICAL LAB VALUE:    PLT: 4    HGB: 7.0  HCT: 22.9  WBC: 3.48  This information was sent to Dr Galvez for review and address.

## 2021-11-15 NOTE — TELEPHONE ENCOUNTER
11/15/21: received call from [facility]. She calls to report that she had received call from Blood Bank and they will not be able to process her PLTs today but will have ready for transfusion tomorrow 11/16/21. Patient informed her that she has apt kenia @ [place] on Wed 11/17/21 and she could make arrangements to have the transfusion there instead. Informed So patient needed to receive the PLT infusion ASAP and our Blood Bank is already in the process of getting the PLTs ready due to her developing a antibody with prior blood products. So will explain that if patient/Ratna experiences any problems she will need to go to the ER dept ASAP due to the severity of her PLTS being @ 4000.

## 2021-11-15 NOTE — PROGRESS NOTES
MG[facility] ONC [facility] GROUP HEMATOLOGY & ONCOLOGY  [address]  [place] [address]  [phone]       11/15/2021     PROGRESS NOTE     PATIENT NAME: Ratna Cohen  YOB: 1955  66 y.o. MR: [id number]    PCP: Faith Alcaraz APRN    HPI:   Ms. Ratna Cohen is feeling well today.  She is asymptomatic.  She received 1 unit of PRBCs last Friday and since then has felt much better.  Today we reviewed the CBC results.  The hemoglobin improved to 7.9.  However, the platelet count was 4.  She remains without evidence of bleeding.    I called Dr. Blanchard from [facility].  I asked for his advice.  We discussed the severe thrombocytopenia.  He recommended transfusion of platelets today.  I requested platelet transfusion today.  Unfortunately the blood bank did not have platelets available today so the patient will return tomorrow.  The patient has an appointment on Wednesday, 11/17/2021 with Dr. Blanchard.  PAST HISTORY:     HEME/ONC HISTORY  Oncology/Hematology History Overview Note     HEME/ONC DX/RX/INVESTIGATIONS SUMMARY:   DX:   PNH (minimal neutropenia, severe anemia, severe thrombocytopenia).   Unremarkable BMBX on 10/4/2021. Anemic symptoms started in 2020, petechiae started in 6/2021.  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 Patient is A-.    10/21/2021, iron overload probably 2nd PRBCs.    10/23/2021, CT abd, undetermined 2.6 cm R adnexal lesion, needs further eval. when patient is more stable.    RX:   9/21/2021, 2U PRBCs and 2 platelet transfusions.  10/15/2021, 1U PRBC.  11/1/2021, 1U single donor PLT. 1U PRBC.  11/5/2021, 2U singe donor PLT.  11/12/2021, 1U PRBC.    Prednisone: 10/15/2021, start 80 mg QD. 10/22/2021, DC prednisone.  10/22/2021, Meningococcus vaccines 1st dose (Bexsero and Menactra)  11/8/2021, Ultomiris, 1st dose (loading  dose).  11/29/2021 (anticipated), Ultomiris Q8wks, C1.  11/8/20201, Cipro 500 mg PO BID x 14 days (prophylactic b/o Ultomiris).    OTHER INFO:   No prior hematological history or significant medical history until 9/21/2021.    Stress test was positive during admission at [facility] in 9/21/2021, cardiac cath not possible because of thrombocytopenia.  Patient never had chest pain, only dyspnea and weakness.    INVESTIGATIONS:   9/21/2021-9:48 AM, WBC 3.4 hemoglobin 7.7 .8 platelets 12 neutrophils 1.1 lymphocytes 1.8.  9/21/2021-17:20 PM, WBC 3.9 hemoglobin 8.3 .1 platelets 23.  9/22/2021, folic acid 25.9 ferritin 365 B12 368 HBsAg negative, HIV negative, hep C antibody negative reticulocyte 4.0%, PT 10.7, INR 1.0 PTT 25.3. Fecal occult blood negative.  9/22/2021, 1:37 AM, WBC 3.6 hemoglobin 7.0 .5 platelets 11 neutrophils 1.06 lymphocytes 2.19.  9/23/2021, WBC 4.0 hemoglobin 10.4 .1 platelets 12.  Neutrophils 1.38 lymphocytes 2.0. BUN 11 creatinine 0.47 calcium 8.5 bilirubin 0.7 ALT 31 AST 26 alk phos 68 albumin 3.7 total protein 7.1.  10/4/201, Diagnosis: BONE MARROW - PERIPHERAL BLOOD SMEAR, ASPIRATE SMEAR, PARTICLE PREPARATION, BIOPSY AND FLOW CYTOMETRY: NORMOCELLULAR MARROW WITH MATURING TRILINEAGE HEMATOPOIESIS, ERYTHROID ATYPIA, AND NO INCREASE IN BLASTS; SEE IMPRESSION.   10/4/2021, Myeloid NGS Panel RESULTS SUMMARY: (Final).Inconclusive: A variant of uncertain significance was detected in this patient's sample.   10/4/2021, PERIPHERAL BLOOD SMEAR ([facility]):   CBC : WBC 4.6 k/microL, Hgb 9.5 g/dL, Plt-Ct 13 k/microL,  fL, RDW 20.8%.   A Pitt's stained peripheral smear is reviewed. Erythrocytes are decreased and are macrocytic and normochromic with anisopoikilocytosis. Polychromasia is present. Neutrophils are adequate in number, and demonstrate no significant morphologic abnormalities. Lymphocytes are adequate in number, and demonstrate no  significant morphologic abnormalities. There are no circulating blasts, plasma cells, or abnormal lymphocytes. Platelets are decreased in number, and demonstrate no significant morphologic abnormalities.     10/13/2021, EPO 1,966.  Haptoglobin < 8.  .  Reticulocyte 4%.  10/15/2021, WBC 3.71 hemoglobin 7.6 .3 platelets 11.  10/18/2021, MARTA negative.    10/22/2021, PNH profile:  Comment: Peripheral Blood:   Glycosylphosphatidylinositol (GPI) anchor deficient cells detected (4.48% of granulocytes, 5.93% of monocytes, 0.07% of red blood cells have features of type II red blood cells, 0.50% of red blood cells have features of type III red blood cells).  10/23/2021, CT chest angio:  No evidence of pulmonary embolus. No acute findings.    10/23/2021, CT abd angio:  1.  No major arterial occlusion or flow-limiting stenosis. Nonaneurysmal  abdominal aorta with heavy atherosclerotic calcification.  2.  2.6 cm indeterminate RIGHT adnexal lesion. Recommend correlation  with pelvic ultrasound.    11/8/2021, type & screen, anti-E antibody  11/12/2021, type & screen, anti-E antibody and anti-D antibody    11/12/2021, [id number] Activity, >100.0 (>66.8) (Normal result).           MEDICAL HISTORY   has a past medical history of Acid reflux, Anemia, and PNH (paroxysmal nocturnal hemoglobinuria) (HCC).   HEALTH MAINTENANCE ITEMS  Health Maintenance Due   Topic Date Due   • COLORECTAL CANCER SCREENING  Never done   • ANNUAL PHYSICAL  Never done   • TDAP/TD VACCINES (1 - Tdap) Never done   • ZOSTER VACCINE (1 of 2) Never done   • INFLUENZA VACCINE  Never done   • HEPATITIS C SCREENING  Never done     <no information>  Last Completed Colonoscopy     This patient has no relevant Health Maintenance data.        Immunization History   Administered Date(s) Administered   • COVID-19 (MODERNA) 1st, 2nd, 3rd Dose Only 03/01/2021, 03/31/2021   • Hepatitis B Vaccine Adult IM 05/10/2007   • Meningococcal B,(Bexsero) 10/22/2021   •  "Meningococcal MCV4P (Menactra) 10/22/2021     Last Completed Mammogram          MAMMOGRAM (Every 2 Years) Next due on 9/1/2023 09/01/2021  Outside Procedure: HC MAMMOGRAM SCREENING BILAT DIGITAL W CAD              FAMILY HISTORY  Family History   Problem Relation Age of Onset   • Diabetes Mother    • Hypertension Mother    • Heart attack Mother    • Hypertension Father    • Heart attack Father        Cancer-related family history is not on file.   SURGICAL HISTORY   has a past surgical history that includes Appendectomy.  SOCIAL HISTORY  Social History     Socioeconomic History   • Marital status:    Tobacco Use   • Smoking status: Never Smoker   • Smokeless tobacco: Never Used   Vaping Use   • Vaping Use: Never used   Substance and Sexual Activity   • Alcohol use: Not Currently   • Drug use: Never   • Sexual activity: Yes     MEDICATIONS:     Current Outpatient Medications   Medication Instructions   • ciprofloxacin (CIPRO) 500 mg, Oral, 2 Times Daily   • pantoprazole (PROTONIX) 20 mg, Oral, Daily   • potassium chloride (K-DUR,KLOR-CON) 20 MEQ CR tablet 20 mEq, Oral, 2 Times Daily      ALLERGIES:   No Known Allergies  ROS:   Pertinent positive and negative findings as noted in HPI.   PHYSICAL EXAM:   /68   Pulse 118   Temp 97.1 °F (36.2 °C)   Resp 16   Ht 154.9 cm (61\")   Wt 63.9 kg (140 lb 14.4 oz)   SpO2 98%   Breastfeeding No   BMI 26.62 kg/m²  Body surface area is 1.63 meters squared.   Pain Score    11/15/21 0918   PainSc: 0-No pain     Alert, oriented x3, cooperative, not in distress.  HEENT: Normocephalic, wearing a facemask.  Lungs: No tachypnea.   Extremities: No ankle edema.  Neurologic: Moves all extremities.    INVESTIGATIONS/LABS:     Lab Results - Last 18 Months   Lab Units 11/15/21  0910 11/12/21  0918 11/09/21  1041 11/08/21  0915 11/05/21  0914 11/01/21  0806 10/27/21  1248 10/27/21  1248 10/23/21  1012 10/23/21  1012 10/21/21  0800 10/21/21  0800 10/18/21  1018 " 10/15/21  1015 10/13/21  1151 09/27/21  0942 09/27/21  0942   WBC 10*3/mm3 3.48 3.45 3.61 2.74* 3.40 4.36   < > 5.13   < > 9.69   < > 8.71   < > 3.71  --    < > 5.24   HEMOGLOBIN g/dL 7.9* 6.9* 7.3* 7.3* 8.1* 7.4*   < > 8.2*   < > 8.9*   < > 9.3*   < > 7.6*  --    < > 9.3*   HEMATOCRIT % 22.9* 20.7* 21.3* 20.9* 24.1* 21.2*   < > 24.5*   < > 26.5*   < > 26.4*   < > 22.4*  --    < > 27.4*   MCV fL 94.2 96.3 96.8 95.9 95.6 100.0*   < > 99.6*   < > 98.9*   < > 98.1*   < > 102.3*  --    < > 101.1*   PLATELETS 10*3/mm3 4* 8* 8* 6* 7* 10*   < > 12*   < > 13*   < > 13*   < > 11*  --    < > 18*   IMM GRAN % %  --  0.3  --  0.4  --  0.2  --   --   --   --   --   --   --  0.3  --   --   --    NEUTROS ABS 10*3/mm3 1.29* 1.29* 1.19* 1.10* 1.09* 1.79   < > 2.07   < > 4.67  --  2.40   < > 1.33* 2.43   < > 3.09   LYMPHS ABS 10*3/mm3 1.84 1.77  --  1.32 1.81 2.14  --  2.68   < > 4.05*  --   --    < > 1.96 2.05  --   --    MONOS ABS 10*3/mm3 0.32 0.34  --  0.27 0.44 0.36  --  0.32   < > 0.84  --   --    < > 0.37 0.21*  --   --    EOS ABS 10*3/mm3 0.02 0.03 0.11 0.04 0.06 0.05   < > 0.03   < > 0.06  --   --    < > 0.03 0.04  --   --    EOSINOPHIL ABS %  --   --   --   --   --   --   --   --   --   --   --   --   --   --  0.9  --   --    BASOS ABS 10*3/mm3 0.00 0.01  --  0.00 0.00 0.01  --  0.01   < > 0.01  --   --    < > 0.01  --   --   --    IMMATURE GRANS (ABS) 10*3/mm3  --  0.01  --  0.01  --  0.01  --   --   --   --   --   --   --  0.01  --   --   --    NRBC /100 WBC  --  0.0  --  0.0  --  0.0  --   --   --   --   --   --   --  0.5*  --   --   --    NEUTROPHIL % %  --   --  33.0*  --   --   --   --   --   --   --   --  24.5*  --   --   --   --  56.0   MONOCYTES % %  --   --  9.0  --   --   --   --   --   --   --   --  4.1*  --   --  4.3  --  7.0   ANISOCYTOSIS   --   --  Mod/2+  --   --   --   --   --   --  Slight/1+  --  Slight/1+  --   --  3+  --  Slight/1+    < > = values in this interval not displayed.       Lab Results - Last  18 Months   Lab Units 11/15/21  0910 11/12/21  0918 11/09/21  1041 11/08/21  0915 11/05/21  0914 11/01/21  0806   BUN mg/dL 10 9 7* 8 7* 6*   CREATININE mg/dL 0.50* 0.56* 0.46* 0.44* 0.55* 0.48*   GLUCOSE mg/dL 130* 128* 101* 142* 103* 119*   SODIUM mmol/L 137 137 137 137 139 139   POTASSIUM mmol/L 3.7 3.5 3.8 3.6 3.9 3.4*   CO2 mmol/L 24.0 23.0 26.0 25.0 26.0 26.0   CHLORIDE mmol/L 102 101 102 103 103 104   ANION GAP mmol/L 11.0 13.0 9.0 9.0 10.0 9.0   BUN / CREAT RATIO  20.0 16.1 15.2 18.2 12.7 12.5   CALCIUM mg/dL 8.9 8.9 9.2 9.2 9.0 9.3   EGFR IF NONAFRICN AM mL/min/1.73 124 109 137 144 111 130   ALK PHOS U/L 68 73 75 69 70 64   TOTAL PROTEIN g/dL 7.0 7.3 7.5 7.2 7.2 7.1   ALT (SGPT) U/L 25 29 35* 37* 29 27   AST (SGOT) U/L 20 22 24 29 23 21   BILIRUBIN mg/dL 0.6 0.7 0.7 0.7 0.7 0.7   ALBUMIN g/dL 4.40 4.50 4.60 4.40 4.30 4.50   GLOBULIN gm/dL 2.6 2.8 2.9 2.8 2.9 2.6       Lab Results - Last 18 Months   Lab Units 11/15/21  0910 11/12/21  0918 10/21/21  0800 10/13/21  1008 09/27/21  0942   LDH U/L 230* 247* 270* 244* 280*       Lab Results - Last 18 Months   Lab Units 10/18/21  1018 10/13/21  1151 10/13/21  1008 10/01/21  1116 09/27/21  0942   IRON mcg/dL 230*  --   --  163 141   TIBC mcg/dL 282*  --   --  222* 280*   IRON SATURATION % 82*  --   --  73 50   FERRITIN ng/mL 872.00*  --   --  418* 472.40*   VITAMIN B 12 pg/mL 665  --  753  --  534   FOLATE ng/mL 17.10  --  16.6  --  >20.00   TSH mcunit/mL  --  1.291  --   --   --        CT Head Without Contrast    Result Date: 10/23/2021   1.  No acute intracranial finding. 2.  Mild paranasal sinus mucosal thickening. 3.  Chronic microvascular ischemic white matter change. This report was finalized on 10/23/2021 11:54 by Dr. Justo Chaves MD.    CT Angiogram Abdomen Pelvis    Result Date: 10/23/2021   1.  No major arterial occlusion or flow-limiting stenosis. Nonaneurysmal abdominal aorta with heavy atherosclerotic calcification. 2.  2.6 cm indeterminate RIGHT  adnexal lesion. Recommend correlation with pelvic ultrasound. This report was finalized on 10/23/2021 13:17 by Dr. Justo Chaves MD.    CT Angiogram Chest    Result Date: 10/23/2021   No evidence of pulmonary embolus. No acute findings. This report was finalized on 10/23/2021 13:12 by Dr. Justo Chaves MD.      ASSESSMENT:   PNH.  It is possible that the patient may be having less hemolysis.  The reticulocyte count and LDH have slightly decreased.     Worsening thrombocytopenia.  The patient previously did not respond to platelet transfusions.  A few days after the platelet transfusion there was no improvement in platelet count.  I suspect that she may have ITP.  As per discussion in HPI, patient will receive platelet transfusion.  I requested platelets transfusion for today but unfortunately subsequently the blood bank informed that there are no platelets available today therefore she will receive the platelets tomorrow.    PLAN/RECOMMENDATIONS:   Transfuse platelets today with pre meds (one unit of single donor or 6 units of random donor).    VST on 11/18/2021, labs 1h PTV.    DX/ORDERS:   Diagnoses and all orders for this visit:    1. PNH (paroxysmal nocturnal hemoglobinuria) (HCC) (Primary)  -     CBC Auto Differential; Future  -     Comprehensive Metabolic Panel; Future  -     Lactate Dehydrogenase; Future  -     Reticulocytes; Future  -     Type & Screen; Future        Future Appointments   Date Time Provider Department Center   11/16/2021  9:00 AM CHAIR 08  [facility] OP INFU ONC  PAD OIONC PAD   11/18/2021  9:00 AM  [facility] CANCER CTR LAB  PAD CCLAB PAD   11/18/2021 10:00 AM Devante Galvez MD [facility] [place] ONC PAD PAD   11/22/2021 11:35 AM  [facility] CANCER CTR LAB  PAD CCLAB PAD   11/22/2021 12:15 PM CHAIR 11 [facility] OP INFU ONC  PAD OIONC PAD   11/23/2021  1:00 PM CHAIR 15 [facility] OP INFU ONC [facility] P[facility] OIONC PAD   12/21/2021  1:00 PM CHAIR 13 [facility] P[facility] OP INFU ONC  PAD OIONC PAD   1/17/2022 11:45 AM  [facility] CANCER CTR LAB   PAD CCLAB PAD   1/17/2022 12:15 PM CHAIR 12  [facility] OP INFU ONC  PAD OIONC PAD        Devante Galvez MD  11/15/2021    cc: No ref. provider found, Faith Alcaraz, APRN

## 2021-11-15 NOTE — PROGRESS NOTES
12:14:  Received a call from the blood bank, unable to match the patient due to antibodies today, can obtain the correct plts for her in AM.  Called Dr. Galvez/Donna, okay to wait til tomorrow for plts, to go to the Er if bleeding occurs.  Time set up for 08:45 am with Alvaro in scheduling.  Pt aware.  Donna to put in new orders for Plts and for pre-meds.  JAZLYN Ramires

## 2021-11-16 ENCOUNTER — INFUSION (OUTPATIENT)
Dept: ONCOLOGY | Facility: HOSPITAL | Age: 66
End: 2021-11-16

## 2021-11-16 ENCOUNTER — TELEPHONE (OUTPATIENT)
Dept: ONCOLOGY | Facility: CLINIC | Age: 66
End: 2021-11-16

## 2021-11-16 VITALS
BODY MASS INDEX: 26.39 KG/M2 | HEART RATE: 90 BPM | OXYGEN SATURATION: 100 % | WEIGHT: 139.8 LBS | RESPIRATION RATE: 18 BRPM | DIASTOLIC BLOOD PRESSURE: 53 MMHG | SYSTOLIC BLOOD PRESSURE: 143 MMHG | HEIGHT: 61 IN | TEMPERATURE: 98.6 F

## 2021-11-16 DIAGNOSIS — D59.5 PNH (PAROXYSMAL NOCTURNAL HEMOGLOBINURIA) (HCC): ICD-10-CM

## 2021-11-16 DIAGNOSIS — D59.5 PNH (PAROXYSMAL NOCTURNAL HEMOGLOBINURIA) (HCC): Primary | ICD-10-CM

## 2021-11-16 PROCEDURE — P9100 PATHOGEN TEST FOR PLATELETS: HCPCS

## 2021-11-16 PROCEDURE — 96374 THER/PROPH/DIAG INJ IV PUSH: CPT

## 2021-11-16 PROCEDURE — 36430 TRANSFUSION BLD/BLD COMPNT: CPT

## 2021-11-16 PROCEDURE — P9035 PLATELET PHERES LEUKOREDUCED: HCPCS

## 2021-11-16 PROCEDURE — 25010000002 DIPHENHYDRAMINE PER 50 MG: Performed by: INTERNAL MEDICINE

## 2021-11-16 PROCEDURE — 25010000002 HYDROCORTISONE SODIUM SUCCINATE 100 MG RECONSTITUTED SOLUTION: Performed by: INTERNAL MEDICINE

## 2021-11-16 PROCEDURE — 96375 TX/PRO/DX INJ NEW DRUG ADDON: CPT

## 2021-11-16 RX ORDER — DIPHENHYDRAMINE HYDROCHLORIDE 50 MG/ML
25 INJECTION INTRAMUSCULAR; INTRAVENOUS ONCE
Status: COMPLETED | OUTPATIENT
Start: 2021-11-16 | End: 2021-11-16

## 2021-11-16 RX ORDER — FAMOTIDINE 10 MG/ML
20 INJECTION, SOLUTION INTRAVENOUS ONCE
Status: CANCELLED | OUTPATIENT
Start: 2021-11-16 | End: 2021-11-16

## 2021-11-16 RX ORDER — DIPHENHYDRAMINE HYDROCHLORIDE 50 MG/ML
25 INJECTION INTRAMUSCULAR; INTRAVENOUS ONCE
Status: CANCELLED | OUTPATIENT
Start: 2021-11-16 | End: 2021-11-16

## 2021-11-16 RX ORDER — ACETAMINOPHEN 325 MG/1
650 TABLET ORAL ONCE
Status: CANCELLED | OUTPATIENT
Start: 2021-11-16 | End: 2021-11-16

## 2021-11-16 RX ORDER — SODIUM CHLORIDE 9 MG/ML
250 INJECTION, SOLUTION INTRAVENOUS AS NEEDED
Status: DISCONTINUED | OUTPATIENT
Start: 2021-11-16 | End: 2021-11-16 | Stop reason: HOSPADM

## 2021-11-16 RX ORDER — SODIUM CHLORIDE 9 MG/ML
250 INJECTION, SOLUTION INTRAVENOUS AS NEEDED
Status: CANCELLED | OUTPATIENT
Start: 2021-11-16

## 2021-11-16 RX ORDER — FAMOTIDINE 10 MG/ML
20 INJECTION, SOLUTION INTRAVENOUS ONCE
Status: COMPLETED | OUTPATIENT
Start: 2021-11-16 | End: 2021-11-16

## 2021-11-16 RX ORDER — SODIUM CHLORIDE 9 MG/ML
250 INJECTION, SOLUTION INTRAVENOUS ONCE
Status: CANCELLED | OUTPATIENT
Start: 2021-11-22

## 2021-11-16 RX ORDER — ACETAMINOPHEN 325 MG/1
650 TABLET ORAL ONCE
Status: COMPLETED | OUTPATIENT
Start: 2021-11-16 | End: 2021-11-16

## 2021-11-16 RX ADMIN — FAMOTIDINE 20 MG: 10 INJECTION INTRAVENOUS at 09:32

## 2021-11-16 RX ADMIN — SODIUM CHLORIDE 250 ML: 9 INJECTION, SOLUTION INTRAVENOUS at 09:31

## 2021-11-16 RX ADMIN — HYDROCORTISONE SODIUM SUCCINATE 100 MG: 100 INJECTION, POWDER, FOR SOLUTION INTRAMUSCULAR; INTRAVENOUS at 09:31

## 2021-11-16 RX ADMIN — DIPHENHYDRAMINE HYDROCHLORIDE 25 MG: 50 INJECTION, SOLUTION INTRAMUSCULAR; INTRAVENOUS at 09:33

## 2021-11-16 RX ADMIN — ACETAMINOPHEN 650 MG: 325 TABLET ORAL at 09:30

## 2021-11-16 NOTE — TELEPHONE ENCOUNTER
TRANSFUSION INFORMATION:  PATIENT PHAN CHAN, REQUIRES ALL PRE-MED'S PRIOR TO TRANSFUSION OF BLOOD PRODUCTS.  BENADRYL 25 MG IV  TYLENOL 650 MG  PEPCID 20 MG IV  SOLU-CORTEF 100 MG IV    PATIENT REQUIRES:  RH NEGATIVE PLTS    NOTIFY BLOOD BANK PRIOR TO PATIENT VISIT/DAY OF TRANSFUSION SO BLOOD PRODUCTS CAN BE PREPPED.     BLOOD BANK RECEIVES BLOOD PRODUCTS ON:   MONDAYS, WEDNESDAYS, FRIDAYS

## 2021-11-17 ENCOUNTER — TELEPHONE (OUTPATIENT)
Dept: ONCOLOGY | Facility: CLINIC | Age: 66
End: 2021-11-17

## 2021-11-17 DIAGNOSIS — D59.5 PNH (PAROXYSMAL NOCTURNAL HEMOGLOBINURIA) (HCC): Primary | ICD-10-CM

## 2021-11-17 LAB
BH BB BLOOD EXPIRATION DATE: NORMAL
BH BB BLOOD EXPIRATION DATE: NORMAL
BH BB BLOOD TYPE BARCODE: 600
BH BB BLOOD TYPE BARCODE: 6200
BH BB DISPENSE STATUS: NORMAL
BH BB DISPENSE STATUS: NORMAL
BH BB PRODUCT CODE: NORMAL
BH BB PRODUCT CODE: NORMAL
BH BB UNIT NUMBER: NORMAL
BH BB UNIT NUMBER: NORMAL
UNIT  ABO: NORMAL
UNIT  ABO: NORMAL
UNIT  RH: NORMAL
UNIT  RH: NORMAL

## 2021-11-17 NOTE — TELEPHONE ENCOUNTER
Caller: KIKO CHAN    Relationship to patient:     Best call back number: [phone]    Chief complaint: CANC./RACHELL.    Type of visit: LABS/FOLLOW UP 1 & INFUSION    Requested date: 11/22/2021    If rescheduling, when is the original appointment: 11/18 & 11/22    Additional notes: KIKO SPOKE WITH DR. HONEYCUTT THIS MORNING AS PATIENT WAS ADMITTED TO [place],  PLEASE CANC. APPTS. FOR TOMORROW & DR. HONEYCUTT WOULD LIKE TO SEE HER EARLIER ON Monday PER KIKO.  PLEASE CALL KIKO TO VERIFY TIME CHANGE FOR Monday.

## 2021-11-18 ENCOUNTER — APPOINTMENT (OUTPATIENT)
Dept: LAB | Facility: HOSPITAL | Age: 66
End: 2021-11-18

## 2021-11-22 ENCOUNTER — LAB (OUTPATIENT)
Dept: LAB | Facility: HOSPITAL | Age: 66
End: 2021-11-22

## 2021-11-22 ENCOUNTER — OFFICE VISIT (OUTPATIENT)
Dept: ONCOLOGY | Facility: CLINIC | Age: 66
End: 2021-11-22

## 2021-11-22 ENCOUNTER — INFUSION (OUTPATIENT)
Dept: ONCOLOGY | Facility: HOSPITAL | Age: 66
End: 2021-11-22

## 2021-11-22 VITALS
OXYGEN SATURATION: 99 % | WEIGHT: 137.9 LBS | RESPIRATION RATE: 16 BRPM | TEMPERATURE: 97.1 F | HEART RATE: 108 BPM | DIASTOLIC BLOOD PRESSURE: 68 MMHG | SYSTOLIC BLOOD PRESSURE: 112 MMHG | HEIGHT: 61 IN | BODY MASS INDEX: 26.04 KG/M2

## 2021-11-22 VITALS
WEIGHT: 138 LBS | OXYGEN SATURATION: 100 % | DIASTOLIC BLOOD PRESSURE: 60 MMHG | HEIGHT: 61 IN | BODY MASS INDEX: 26.06 KG/M2 | SYSTOLIC BLOOD PRESSURE: 153 MMHG | RESPIRATION RATE: 18 BRPM | TEMPERATURE: 98.5 F | HEART RATE: 89 BPM

## 2021-11-22 DIAGNOSIS — D59.5 PNH (PAROXYSMAL NOCTURNAL HEMOGLOBINURIA) (HCC): Primary | ICD-10-CM

## 2021-11-22 DIAGNOSIS — D69.3 ACUTE ITP (HCC): ICD-10-CM

## 2021-11-22 LAB
ABO GROUP BLD: NORMAL
ALBUMIN SERPL-MCNC: 4 G/DL (ref 3.5–5.2)
ALBUMIN/GLOB SERPL: 0.8 G/DL
ALP SERPL-CCNC: 57 U/L (ref 39–117)
ALT SERPL W P-5'-P-CCNC: 25 U/L (ref 1–33)
ANION GAP SERPL CALCULATED.3IONS-SCNC: 10 MMOL/L (ref 5–15)
ANISOCYTOSIS BLD QL: ABNORMAL
ANTI-D: NORMAL
ANTI-E: NORMAL
AST SERPL-CCNC: 20 U/L (ref 1–32)
BILIRUB SERPL-MCNC: 0.8 MG/DL (ref 0–1.2)
BLD GP AB SCN SERPL QL: POSITIVE
BUN SERPL-MCNC: 13 MG/DL (ref 8–23)
BUN/CREAT SERPL: 22.8 (ref 7–25)
CALCIUM SPEC-SCNC: 9 MG/DL (ref 8.6–10.5)
CHLORIDE SERPL-SCNC: 101 MMOL/L (ref 98–107)
CO2 SERPL-SCNC: 25 MMOL/L (ref 22–29)
CREAT SERPL-MCNC: 0.57 MG/DL (ref 0.57–1)
DEPRECATED RDW RBC AUTO: 55.6 FL (ref 37–54)
ERYTHROCYTE [DISTWIDTH] IN BLOOD BY AUTOMATED COUNT: 19.1 % (ref 12.3–15.4)
GFR SERPL CREATININE-BSD FRML MDRD: 107 ML/MIN/1.73
GLOBULIN UR ELPH-MCNC: 5.2 GM/DL
GLUCOSE SERPL-MCNC: 119 MG/DL (ref 65–99)
HAPTOGLOB SERPL-MCNC: 24 MG/DL (ref 30–200)
HCT VFR BLD AUTO: 22.8 % (ref 34–46.6)
HGB BLD-MCNC: 7.7 G/DL (ref 12–15.9)
HOLD SPECIMEN: NORMAL
LDH SERPL-CCNC: 223 U/L (ref 135–214)
LYMPHOCYTES # BLD MANUAL: 2.2 10*3/MM3 (ref 0.7–3.1)
LYMPHOCYTES NFR BLD MANUAL: 10.1 % (ref 5–12)
MACROCYTES BLD QL SMEAR: ABNORMAL
MCH RBC QN AUTO: 29.8 PG (ref 26.6–33)
MCHC RBC AUTO-ENTMCNC: 33.8 G/DL (ref 31.5–35.7)
MCV RBC AUTO: 88.4 FL (ref 79–97)
MONOCYTES # BLD: 0.3 10*3/MM3 (ref 0.1–0.9)
NEUTROPHILS # BLD AUTO: 0.45 10*3/MM3 (ref 1.7–7)
NEUTROPHILS NFR BLD MANUAL: 15.2 % (ref 42.7–76)
PLATELET # BLD AUTO: 2 10*3/MM3 (ref 140–450)
PMV BLD AUTO: ABNORMAL FL
POLYCHROMASIA BLD QL SMEAR: ABNORMAL
POTASSIUM SERPL-SCNC: 3.6 MMOL/L (ref 3.5–5.2)
PROT SERPL-MCNC: 9.2 G/DL (ref 6–8.5)
RBC # BLD AUTO: 2.58 10*6/MM3 (ref 3.77–5.28)
RETICS # AUTO: 0.06 10*6/MM3 (ref 0.02–0.13)
RETICS/RBC NFR AUTO: 2.28 % (ref 0.7–1.9)
RH BLD: NEGATIVE
SMALL PLATELETS BLD QL SMEAR: ABNORMAL
SODIUM SERPL-SCNC: 136 MMOL/L (ref 136–145)
T&S EXPIRATION DATE: NORMAL
VARIANT LYMPHS NFR BLD MANUAL: 67.7 % (ref 19.6–45.3)
VARIANT LYMPHS NFR BLD MANUAL: 7.1 % (ref 0–5)
WBC MORPH BLD: NORMAL
WBC NRBC COR # BLD: 2.94 10*3/MM3 (ref 3.4–10.8)

## 2021-11-22 PROCEDURE — 96365 THER/PROPH/DIAG IV INF INIT: CPT

## 2021-11-22 PROCEDURE — 86901 BLOOD TYPING SEROLOGIC RH(D): CPT | Performed by: INTERNAL MEDICINE

## 2021-11-22 PROCEDURE — 85045 AUTOMATED RETICULOCYTE COUNT: CPT

## 2021-11-22 PROCEDURE — 99214 OFFICE O/P EST MOD 30 MIN: CPT | Performed by: INTERNAL MEDICINE

## 2021-11-22 PROCEDURE — 96413 CHEMO IV INFUSION 1 HR: CPT

## 2021-11-22 PROCEDURE — 80053 COMPREHEN METABOLIC PANEL: CPT

## 2021-11-22 PROCEDURE — 25010000002: Performed by: INTERNAL MEDICINE

## 2021-11-22 PROCEDURE — 85025 COMPLETE CBC W/AUTO DIFF WBC: CPT

## 2021-11-22 PROCEDURE — 36415 COLL VENOUS BLD VENIPUNCTURE: CPT | Performed by: INTERNAL MEDICINE

## 2021-11-22 PROCEDURE — 86870 RBC ANTIBODY IDENTIFICATION: CPT | Performed by: INTERNAL MEDICINE

## 2021-11-22 PROCEDURE — 85007 BL SMEAR W/DIFF WBC COUNT: CPT

## 2021-11-22 PROCEDURE — 83010 ASSAY OF HAPTOGLOBIN QUANT: CPT | Performed by: INTERNAL MEDICINE

## 2021-11-22 PROCEDURE — 86850 RBC ANTIBODY SCREEN: CPT | Performed by: INTERNAL MEDICINE

## 2021-11-22 PROCEDURE — 83615 LACTATE (LD) (LDH) ENZYME: CPT

## 2021-11-22 PROCEDURE — 86900 BLOOD TYPING SEROLOGIC ABO: CPT | Performed by: INTERNAL MEDICINE

## 2021-11-22 RX ORDER — SODIUM CHLORIDE 9 MG/ML
250 INJECTION, SOLUTION INTRAVENOUS ONCE
Status: COMPLETED | OUTPATIENT
Start: 2021-11-22 | End: 2021-11-22

## 2021-11-22 RX ADMIN — RAVULIZUMAB 3300 MG: 300 SOLUTION, CONCENTRATE INTRAVENOUS at 14:28

## 2021-11-22 RX ADMIN — SODIUM CHLORIDE 250 ML: 9 INJECTION, SOLUTION INTRAVENOUS at 14:13

## 2021-11-22 NOTE — PROGRESS NOTES
[facility] ONC [facility] GROUP HEMATOLOGY & ONCOLOGY  [address]  [place] [address]  [phone]       11/22/2021     PROGRESS NOTE     PATIENT NAME: Ratna Cohen  YOB: 1955  66 y.o. MR: [id number]    PCP: Faith Alcaraz APRN    HPI:   Ms. Ratna Cohen was admitted to [facility] on 11/17/2021 after being noted there to have severe thrombocytopenia.  The platelet count was 4.  She received PRBC, IVIG and platelets.  Unfortunately there was no increase in platelet count except that when she had a CBC done 30 minutes after the latest platelet transfusion the platelet count increased to 36.  Today the patient came for follow-up accompanied by her .  The daughter who is a pharmacist participated over the phone.    We reviewed the recommendations given at [facility].  The patient was advised to continue Ultomiris today.  We had previously discussed starting Promacta which they also recommended at [facility] in case the patient developed worsening thrombocytopenia.    Today's CBC noted a platelet count of 2.  The patient denies evidence of bleeding.  She may have occasional minimal bloody nose secretion but otherwise there is no evidence of bleeding.    I reviewed with the pharmacy treatment for ITP.  We discussed that there could be a problem in obtaining Promacta in a timely fashion therefore we decided to try to obtain Nplate since it might be able to be started sooner.    The patient, the  and her daughter agreed with the plan.    The patient complains of chronic abdominal pain, mostly in the right upper quadrant.  It might be worse today.  This is the same type of pain that has been present for several months.  She does not feel the pattern is changed.  We reviewed the unremarkable prior two CTs of the abdomen and decided to continue supportive care regarding the abdominal  pain.    PAST HISTORY:     HEME/ONC HISTORY  Oncology/Hematology History Overview Note     HEME/ONC DX/RX/INVESTIGATIONS SUMMARY:   DX:   PNH (minimal neutropenia, severe anemia, severe thrombocytopenia).   Unremarkable BMBX on 10/4/2021. Anemic symptoms started in 2020, petechiae started in 6/2021.  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 Patient is A-.    10/21/2021, iron overload probably 2nd PRBCs.    10/23/2021, CT abd, undetermined 2.6 cm R adnexal lesion, needs further eval. when patient is more stable.    RX:   9/21/2021, 2U PRBCs and 2 platelet transfusions.  10/15/2021, 1U PRBC.  11/1/2021, 1U single donor PLT. 1U PRBC.  11/5/2021, 2U singe donor PLT.  11/12/2021, 1U PRBC.  11/17/2021 to 11/20/2021, Admitted to [place], received IVIG (did not respond), 1 U PRBC, 3 PLT transfusions.    Prednisone: 10/15/2021, start 80 mg QD. 10/22/2021, DC prednisone.  10/22/2021, Meningococcus vaccines 1st dose (Bexsero and Menactra)  Ultomiris, 11/8/2021, 1st dose (loading dose). 11/22/2021 Ultomiris 2nd dose (start rx Q8wks).   11/8/20201, Cipro 500 mg PO BID x 14 days (prophylactic b/o Ultomiris).      OTHER INFO:   No prior hematological history or significant medical history until 9/21/2021.    Stress test was positive during admission at [facility] in 9/21/2021, cardiac cath not possible because of thrombocytopenia.  Patient never had chest pain, only dyspnea and weakness.    INVESTIGATIONS:   9/21/2021-9:48 AM, WBC 3.4 hemoglobin 7.7 .8 platelets 12 neutrophils 1.1 lymphocytes 1.8.  9/21/2021-17:20 PM, WBC 3.9 hemoglobin 8.3 .1 platelets 23.  9/22/2021, folic acid 25.9 ferritin 365 B12 368 HBsAg negative, HIV negative, hep C antibody negative reticulocyte 4.0%, PT 10.7, INR 1.0 PTT 25.3. Fecal occult blood negative.  9/22/2021, 1:37 AM, WBC 3.6 hemoglobin 7.0 MCV  113.5 platelets 11 neutrophils 1.06 lymphocytes 2.19.  9/23/2021, WBC 4.0 hemoglobin 10.4 .1 platelets 12.  Neutrophils 1.38 lymphocytes 2.0. BUN 11 creatinine 0.47 calcium 8.5 bilirubin 0.7 ALT 31 AST 26 alk phos 68 albumin 3.7 total protein 7.1.  10/4/201, Diagnosis: BONE MARROW - PERIPHERAL BLOOD SMEAR, ASPIRATE SMEAR, PARTICLE PREPARATION, BIOPSY AND FLOW CYTOMETRY: NORMOCELLULAR MARROW WITH MATURING TRILINEAGE HEMATOPOIESIS, ERYTHROID ATYPIA, AND NO INCREASE IN BLASTS; SEE IMPRESSION.   10/4/2021, Myeloid NGS Panel RESULTS SUMMARY: (Final).Inconclusive: A variant of uncertain significance was detected in this patient's sample.   10/4/2021, PERIPHERAL BLOOD SMEAR ([place]):   CBC : WBC 4.6 k/microL, Hgb 9.5 g/dL, Plt-Ct 13 k/microL,  fL, RDW 20.8%.   A Pitt's stained peripheral smear is reviewed. Erythrocytes are decreased and are macrocytic and normochromic with anisopoikilocytosis. Polychromasia is present. Neutrophils are adequate in number, and demonstrate no significant morphologic abnormalities. Lymphocytes are adequate in number, and demonstrate no significant morphologic abnormalities. There are no circulating blasts, plasma cells, or abnormal lymphocytes. Platelets are decreased in number, and demonstrate no significant morphologic abnormalities.     10/13/2021, EPO 1,966.  Haptoglobin < 8.  .  Reticulocyte 4%.  10/15/2021, WBC 3.71 hemoglobin 7.6 .3 platelets 11.  10/18/2021, MARTA negative.    10/22/2021, PNH profile:  Comment: Peripheral Blood:   Glycosylphosphatidylinositol (GPI) anchor deficient cells detected (4.48% of granulocytes, 5.93% of monocytes, 0.07% of red blood cells have features of type II red blood cells, 0.50% of red blood cells have features of type III red blood cells).  10/23/2021, CT chest angio:  No evidence of pulmonary embolus. No acute findings.    10/23/2021, CT abd angio:  1.  No major arterial occlusion or flow-limiting stenosis.  Nonaneurysmal  abdominal aorta with heavy atherosclerotic calcification.  2.  2.6 cm indeterminate RIGHT adnexal lesion. Recommend correlation  with pelvic ultrasound.  11/8/2021, type & screen, anti-E antibody  11/12/2021, type & screen, anti-E antibody and anti-D antibody  11/12/2021, [id number] Activity, >100.0 (>66.8) (Normal result).  11/19/2021, CT chest/abd at [place], unremarkable.         MEDICAL HISTORY   has a past medical history of Acid reflux, Anemia, and PNH (paroxysmal nocturnal hemoglobinuria) (HCC).   HEALTH MAINTENANCE ITEMS  Health Maintenance Due   Topic Date Due   • COLORECTAL CANCER SCREENING  Never done   • ANNUAL PHYSICAL  Never done   • TDAP/TD VACCINES (1 - Tdap) Never done   • ZOSTER VACCINE (1 of 2) Never done   • INFLUENZA VACCINE  Never done   • HEPATITIS C SCREENING  Never done   • COVID-19 Vaccine (3 - Booster for Moderna series) 09/30/2021     <no information>  Last Completed Colonoscopy     This patient has no relevant Health Maintenance data.        Immunization History   Administered Date(s) Administered   • COVID-19 (MODERNA) 1st, 2nd, 3rd Dose Only 03/01/2021, 03/31/2021   • Hepatitis B Vaccine Adult IM 05/10/2007   • Meningococcal B,(Bexsero) 10/22/2021   • Meningococcal MCV4P (Menactra) 10/22/2021     Last Completed Mammogram          MAMMOGRAM (Every 2 Years) Next due on 9/1/2023 09/01/2021  Outside Procedure: HC MAMMOGRAM SCREENING BILAT DIGITAL W CAD              FAMILY HISTORY  Family History   Problem Relation Age of Onset   • Diabetes Mother    • Hypertension Mother    • Heart attack Mother    • Hypertension Father    • Heart attack Father        Cancer-related family history is not on file.   SURGICAL HISTORY   has a past surgical history that includes Appendectomy.  SOCIAL HISTORY  Social History     Socioeconomic History   • Marital status:    Tobacco Use   • Smoking status: Never Smoker   • Smokeless tobacco: Never Used   Vaping Use   • Vaping Use: Never  "used   Substance and Sexual Activity   • Alcohol use: Not Currently   • Drug use: Never   • Sexual activity: Yes     MEDICATIONS:     Current Outpatient Medications   Medication Instructions   • ciprofloxacin (CIPRO) 500 mg, Oral, 2 Times Daily   • pantoprazole (PROTONIX) 20 mg, Oral, Daily   • potassium chloride (K-DUR,KLOR-CON) 20 MEQ CR tablet 20 mEq, Oral, 2 Times Daily      ALLERGIES:   No Known Allergies  ROS:   Pertinent positive and negative findings as noted in HPI.   PHYSICAL EXAM:   /68   Pulse 108   Temp 97.1 °F (36.2 °C)   Resp 16   Ht 154.9 cm (61\")   Wt 62.6 kg (137 lb 14.4 oz)   SpO2 99%   Breastfeeding No   BMI 26.06 kg/m²  Body surface area is 1.61 meters squared.   Pain Score    11/22/21 1120   PainSc:   3     Alert, oriented x3, cooperative, not in distress.  HEENT: Normocephalic, wearing a facemask.  Abdomen: Soft, nontender, no palpable organomegaly.  Extremities: No ankle edema.  Neurologic: Moves all extremities.    INVESTIGATIONS/LABS:     Lab Results - Last 18 Months   Lab Units 11/22/21  1112 11/15/21  0910 11/12/21  0918 11/09/21  1041 11/08/21  0915 11/05/21  0914 11/01/21  0806 11/01/21  0806 10/27/21  1248 10/27/21  1248 10/23/21  1012 10/23/21  1012 10/21/21  0800 10/21/21  0800 10/18/21  1018 10/15/21  1015 10/13/21  1151 09/27/21  0942 09/27/21  0942   WBC 10*3/mm3 2.94* 3.48 3.45 3.61 2.74* 3.40   < > 4.36   < > 5.13   < > 9.69   < > 8.71   < > 3.71  --    < > 5.24   HEMOGLOBIN g/dL 7.7* 7.9* 6.9* 7.3* 7.3* 8.1*   < > 7.4*   < > 8.2*   < > 8.9*   < > 9.3*   < > 7.6*  --    < > 9.3*   HEMATOCRIT % 22.8* 22.9* 20.7* 21.3* 20.9* 24.1*   < > 21.2*   < > 24.5*   < > 26.5*   < > 26.4*   < > 22.4*  --    < > 27.4*   MCV fL 88.4 94.2 96.3 96.8 95.9 95.6   < > 100.0*   < > 99.6*   < > 98.9*   < > 98.1*   < > 102.3*  --    < > 101.1*   PLATELETS 10*3/mm3 2* 4* 8* 8* 6* 7*   < > 10*   < > 12*   < > 13*   < > 13*   < > 11*  --    < > 18*   IMM GRAN % %  --   --  0.3  --  0.4  --  "  --  0.2  --   --   --   --   --   --   --  0.3  --   --   --    NEUTROS ABS 10*3/mm3 0.45* 1.29* 1.29* 1.19* 1.10* 1.09*   < > 1.79   < > 2.07   < > 4.67  --  2.40   < > 1.33* 2.43   < > 3.09   LYMPHS ABS 10*3/mm3  --  1.84 1.77  --  1.32 1.81  --  2.14  --  2.68   < > 4.05*  --   --    < > 1.96 2.05  --   --    MONOS ABS 10*3/mm3  --  0.32 0.34  --  0.27 0.44  --  0.36  --  0.32   < > 0.84  --   --    < > 0.37 0.21*  --   --    EOS ABS 10*3/mm3  --  0.02 0.03 0.11 0.04 0.06  --  0.05   < > 0.03   < > 0.06  --   --    < > 0.03 0.04  --   --    EOSINOPHIL ABS %  --   --   --   --   --   --   --   --   --   --   --   --   --   --   --   --  0.9  --   --    BASOS ABS 10*3/mm3  --  0.00 0.01  --  0.00 0.00  --  0.01  --  0.01   < > 0.01  --   --    < > 0.01  --   --   --    IMMATURE GRANS (ABS) 10*3/mm3  --   --  0.01  --  0.01  --   --  0.01  --   --   --   --   --   --   --  0.01  --   --   --    NRBC /100 WBC  --   --  0.0  --  0.0  --   --  0.0  --   --   --   --   --   --   --  0.5*  --   --   --    NEUTROPHIL % % 15.2*  --   --  33.0*  --   --   --   --   --   --   --   --   --  24.5*  --   --   --   --  56.0   MONOCYTES % % 10.1  --   --  9.0  --   --   --   --   --   --   --   --   --  4.1*  --   --  4.3  --  7.0   ATYP LYMPH % % 7.1*  --   --   --   --   --   --   --   --   --   --   --   --   --   --   --   --   --   --    ANISOCYTOSIS  Slight/1+  --   --  Mod/2+  --   --   --   --   --   --   --  Slight/1+  --  Slight/1+  --   --  3+  --  Slight/1+    < > = values in this interval not displayed.       Lab Results - Last 18 Months   Lab Units 11/22/21  1112 11/15/21  0910 11/12/21  0918 11/09/21  1041 11/08/21  0915 11/05/21  0914   BUN mg/dL 13 10 9 7* 8 7*   CREATININE mg/dL 0.57 0.50* 0.56* 0.46* 0.44* 0.55*   GLUCOSE mg/dL 119* 130* 128* 101* 142* 103*   SODIUM mmol/L 136 137 137 137 137 139   POTASSIUM mmol/L 3.6 3.7 3.5 3.8 3.6 3.9   CO2 mmol/L 25.0 24.0 23.0 26.0 25.0 26.0   CHLORIDE mmol/L 101 102 101  102 103 103   ANION GAP mmol/L 10.0 11.0 13.0 9.0 9.0 10.0   BUN / CREAT RATIO  22.8 20.0 16.1 15.2 18.2 12.7   CALCIUM mg/dL 9.0 8.9 8.9 9.2 9.2 9.0   EGFR IF NONAFRICN AM mL/min/1.73 107 124 109 137 144 111   ALK PHOS U/L 57 68 73 75 69 70   TOTAL PROTEIN g/dL 9.2* 7.0 7.3 7.5 7.2 7.2   ALT (SGPT) U/L 25 25 29 35* 37* 29   AST (SGOT) U/L 20 20 22 24 29 23   BILIRUBIN mg/dL 0.8 0.6 0.7 0.7 0.7 0.7   ALBUMIN g/dL 4.00 4.40 4.50 4.60 4.40 4.30   GLOBULIN gm/dL 5.2 2.6 2.8 2.9 2.8 2.9       Lab Results - Last 18 Months   Lab Units 11/22/21  1112 11/17/21  0810 11/15/21  0910 11/12/21  0918 10/21/21  0800 10/13/21  1008   LDH U/L 223* 222* 230* 247* 270* 244*       Lab Results - Last 18 Months   Lab Units 10/18/21  1018 10/13/21  1151 10/13/21  1008 10/01/21  1116 09/27/21  0942   IRON mcg/dL 230*  --   --  163 141   TIBC mcg/dL 282*  --   --  222* 280*   IRON SATURATION % 82*  --   --  73 50   FERRITIN ng/mL 872.00*  --   --  418* 472.40*   VITAMIN B 12 pg/mL 665  --  753  --  534   FOLATE ng/mL 17.10  --  16.6  --  >20.00   TSH mcunit/mL  --  1.291  --   --   --        CT Head Without Contrast    Result Date: 10/23/2021   1.  No acute intracranial finding. 2.  Mild paranasal sinus mucosal thickening. 3.  Chronic microvascular ischemic white matter change. This report was finalized on 10/23/2021 11:54 by Dr. Justo Chaves MD.    CT Angiogram Abdomen Pelvis    Result Date: 10/23/2021   1.  No major arterial occlusion or flow-limiting stenosis. Nonaneurysmal abdominal aorta with heavy atherosclerotic calcification. 2.  2.6 cm indeterminate RIGHT adnexal lesion. Recommend correlation with pelvic ultrasound. This report was finalized on 10/23/2021 13:17 by Dr. Justo Chaves MD.    CT Angiogram Chest    Result Date: 10/23/2021   No evidence of pulmonary embolus. No acute findings. This report was finalized on 10/23/2021 13:12 by Dr. Justo Chaves MD.      ASSESSMENT:   PNH.  The patient has evidence of having response  to Ultomiris albeit a modest response so far.  Nonetheless, there is consistent improvement in reticulocyte count, haptoglobin, LDH and PRBC transfusion requirement.    ITP.  The patient did not respond to platelet transfusions.  She is refractory to steroids and IVIG.    Incidental finding of hyperproteinemia today, secondary to recent IVIG administration.    PLAN/RECOMMENDATIONS:   Ultomiris IV today.    Nplate 1mcg/Kg SubQ weekly, 1st dose ASAP.    VST 11/24/2021, labs 1h PTV.    DX/ORDERS:   Diagnoses and all orders for this visit:    1. PNH (paroxysmal nocturnal hemoglobinuria) (HCC) (Primary)  -     Cancel: CBC Auto Differential  -     Cancel: Comprehensive Metabolic Panel  -     Type & Screen  -     Cancel: Lactate Dehydrogenase  -     Haptoglobin  -     Cancel: Reticulocytes  -     CBC Auto Differential; Future  -     Type & Screen; Future    2. Acute ITP (HCC)        Future Appointments   Date Time Provider Department Center   11/23/2021  1:00 PM CHAIR 14  PAD OP INFU ONC  PAD OIONC PAD   11/24/2021  9:30 AM  PAD CANCER CTR LAB  PAD CCLAB PAD   11/24/2021 10:30 AM Devante Galvez [place] [facility]   11/24/2021  1:00 PM CHAIR 08 [facility] PAD OP INFU ONC  PAD OIONC PAD   12/1/2021  2:45 PM CHAIR 13 BH PAD OP INFU ONC  PAD OIONC PAD   12/8/2021  1:00 PM CHAIR 14 BH PAD OP INFU ONC  PAD OIONC PAD   12/15/2021  1:00 PM CHAIR 13 BH PAD OP INFU ONC BH PAD OIONC PAD   12/21/2021  1:00 PM CHAIR 13 BH PAD OP INFU ONC BH PAD OIONC PAD   12/22/2021  2:00 PM CHAIR 11 BH PAD OP INFU ONC  PAD OIONC PAD   12/29/2021  1:00 PM CHAIR 11 BH PAD OP INFU ONC BH PAD OIONC PAD   1/17/2022 11:45 AM  PAD CANCER CTR LAB  PAD CCLAB PAD   1/17/2022 12:15 PM CHAIR 12 BH PAD OP INFU ONC  PAD OIONC PAD        Devante Galvez MD  11/22/2021    cc:  Faith Alcaraz APRN

## 2021-11-22 NOTE — PROGRESS NOTES
[address] pharmacist gave instructions to give 3300 mg dose over 1 hour and volume was 280 ml. Lisa Hamilton RN

## 2021-11-23 ENCOUNTER — INFUSION (OUTPATIENT)
Dept: ONCOLOGY | Facility: HOSPITAL | Age: 66
End: 2021-11-23

## 2021-11-23 ENCOUNTER — SPECIALTY PHARMACY (OUTPATIENT)
Dept: ONCOLOGY | Facility: HOSPITAL | Age: 66
End: 2021-11-23

## 2021-11-23 VITALS
RESPIRATION RATE: 16 BRPM | SYSTOLIC BLOOD PRESSURE: 162 MMHG | TEMPERATURE: 97.1 F | BODY MASS INDEX: 25.86 KG/M2 | HEIGHT: 61 IN | OXYGEN SATURATION: 100 % | DIASTOLIC BLOOD PRESSURE: 55 MMHG | WEIGHT: 137 LBS | HEART RATE: 92 BPM

## 2021-11-23 DIAGNOSIS — Z23 NEED FOR MENINGOCOCCAL VACCINATION: Primary | ICD-10-CM

## 2021-11-23 DIAGNOSIS — D69.3 IMMUNE THROMBOCYTOPENIA (HCC): ICD-10-CM

## 2021-11-23 PROCEDURE — 25010000002 MENINGOCOCCAL B SUSPENSION PREFILLED SYRINGE: Performed by: INTERNAL MEDICINE

## 2021-11-23 PROCEDURE — 96372 THER/PROPH/DIAG INJ SC/IM: CPT

## 2021-11-23 PROCEDURE — 90620 MENB-4C VACCINE IM: CPT | Performed by: INTERNAL MEDICINE

## 2021-11-23 PROCEDURE — 90471 IMMUNIZATION ADMIN: CPT

## 2021-11-23 RX ADMIN — NEISSERIA MENINGITIDIS SEROGROUP B NHBA FUSION PROTEIN ANTIGEN, NEISSERIA MENINGITIDIS SEROGROUP B FHBP FUSION PROTEIN ANTIGEN AND NEISSERIA MENINGITIDIS SEROGROUP B NADA PROTEIN ANTIGEN 0.5 ML: 50; 50; 50; 25 INJECTION, SUSPENSION INTRAMUSCULAR at 13:45

## 2021-11-23 NOTE — PATIENT INSTRUCTIONS
Meningococcal Group B Vaccine (4 strain) suspension for injection  What is this medicine?  MENINGOCOCCAL GROUP B VACCINE, RECOMBINANT (kyler AREVALO alex vak SEEN) is a vaccine to protect from bacterial meningitis. This vaccine does not contain live bacteria. It will not cause a meningitis.  This medicine may be used for other purposes; ask your health care provider or pharmacist if you have questions.  COMMON BRAND NAME(S): CHELA  What should I tell my health care provider before I take this medicine?  They need to know if you have any of these conditions:  · bleeding disorder  · fever or infection  · immune system problems  · an unusual or allergic reaction to meningococcal vaccine, other medicines, foods, dyes, or preservatives  · pregnant or trying to get pregnant  · breast-feeding  How should I use this medicine?  This medicine is for injection into a muscle. It is given by a health care professional in a hospital or clinic setting.  A copy of Vaccine Information Statements will be given before each vaccination. Read this sheet carefully each time. The sheet may change frequently.  Talk to your pediatrician regarding the use of this medicine in children. While this drug may be prescribed for children as young as 10 years of age for selected conditions, precautions do apply.  Overdosage: If you think you have taken too much of this medicine contact a poison control center or emergency room at once.  NOTE: This medicine is only for you. Do not share this medicine with others.  What if I miss a dose?  It is important not to miss your dose. Call your doctor or health care professional if you are unable to keep an appointment.  What may interact with this medicine?  · certain medicines that treat or prevent blood clots  · medicines that lower your chance of fighting infection  · other vaccines  This list may not describe all possible interactions. Give your health care provider a list of all the medicines,  herbs, non-prescription drugs, or dietary supplements you use. Also tell them if you smoke, drink alcohol, or use illegal drugs. Some items may interact with your medicine.  What should I watch for while using this medicine?  Report any side effects that are worrisome to your doctor right away.  This vaccine may not protect from all meningitis infections.  What side effects may I notice from receiving this medicine?  Side effects that you should report to your doctor or health care professional as soon as possible:  · allergic reactions like skin rash, itching or hives, swelling of the face, lips, or tongue  · breathing problems  Side effects that usually do not require medical attention (report to your doctor or health care professional if they continue or are bothersome):  · chills  · diarrhea  · fever  · headache  · joint pain  · muscle pain  · pain, redness, or irritation at site where injected  This list may not describe all possible side effects. Call your doctor for medical advice about side effects. You may report side effects to FDA at 2-792-QAG-0299.  Where should I keep my medicine?  This vaccine is given in a hospital or clinic and will not be stored at home.  NOTE: This sheet is a summary. It may not cover all possible information. If you have questions about this medicine, talk to your doctor, pharmacist, or health care provider.  © 2021 [employer]/Gold Standard (2019-11-04 10:25:45)

## 2021-11-24 ENCOUNTER — INFUSION (OUTPATIENT)
Dept: ONCOLOGY | Facility: HOSPITAL | Age: 66
End: 2021-11-24

## 2021-11-24 ENCOUNTER — TELEPHONE (OUTPATIENT)
Dept: ONCOLOGY | Facility: CLINIC | Age: 66
End: 2021-11-24

## 2021-11-24 ENCOUNTER — LAB (OUTPATIENT)
Dept: LAB | Facility: HOSPITAL | Age: 66
End: 2021-11-24

## 2021-11-24 ENCOUNTER — OFFICE VISIT (OUTPATIENT)
Dept: ONCOLOGY | Facility: CLINIC | Age: 66
End: 2021-11-24

## 2021-11-24 VITALS
WEIGHT: 137.5 LBS | DIASTOLIC BLOOD PRESSURE: 68 MMHG | SYSTOLIC BLOOD PRESSURE: 126 MMHG | TEMPERATURE: 97.5 F | HEART RATE: 101 BPM | OXYGEN SATURATION: 99 % | RESPIRATION RATE: 16 BRPM | HEIGHT: 61 IN | BODY MASS INDEX: 25.96 KG/M2

## 2021-11-24 VITALS
HEIGHT: 61 IN | TEMPERATURE: 98.1 F | OXYGEN SATURATION: 100 % | BODY MASS INDEX: 25.79 KG/M2 | HEART RATE: 107 BPM | RESPIRATION RATE: 16 BRPM | DIASTOLIC BLOOD PRESSURE: 53 MMHG | WEIGHT: 136.6 LBS | SYSTOLIC BLOOD PRESSURE: 152 MMHG

## 2021-11-24 DIAGNOSIS — D59.5 PNH (PAROXYSMAL NOCTURNAL HEMOGLOBINURIA) (HCC): Primary | ICD-10-CM

## 2021-11-24 DIAGNOSIS — D69.3 ACUTE ITP (HCC): ICD-10-CM

## 2021-11-24 LAB
BASOPHILS # BLD AUTO: 0 10*3/MM3 (ref 0–0.2)
BASOPHILS NFR BLD AUTO: 0 % (ref 0–1.5)
DEPRECATED RDW RBC AUTO: 54.5 FL (ref 37–54)
EOSINOPHIL # BLD AUTO: 0.01 10*3/MM3 (ref 0–0.4)
EOSINOPHIL NFR BLD AUTO: 0.3 % (ref 0.3–6.2)
ERYTHROCYTE [DISTWIDTH] IN BLOOD BY AUTOMATED COUNT: 19.2 % (ref 12.3–15.4)
HCT VFR BLD AUTO: 23.4 % (ref 34–46.6)
HGB BLD-MCNC: 7.9 G/DL (ref 12–15.9)
HOLD SPECIMEN: NORMAL
LYMPHOCYTES # BLD AUTO: 1.59 10*3/MM3 (ref 0.7–3.1)
LYMPHOCYTES NFR BLD AUTO: 51.5 % (ref 19.6–45.3)
MCH RBC QN AUTO: 29.6 PG (ref 26.6–33)
MCHC RBC AUTO-ENTMCNC: 33.8 G/DL (ref 31.5–35.7)
MCV RBC AUTO: 87.6 FL (ref 79–97)
MONOCYTES # BLD AUTO: 0.3 10*3/MM3 (ref 0.1–0.9)
MONOCYTES NFR BLD AUTO: 9.7 % (ref 5–12)
NEUTROPHILS NFR BLD AUTO: 1.18 10*3/MM3 (ref 1.7–7)
NEUTROPHILS NFR BLD AUTO: 38.2 % (ref 42.7–76)
PLATELET # BLD AUTO: 2 10*3/MM3 (ref 140–450)
PMV BLD AUTO: ABNORMAL FL
RBC # BLD AUTO: 2.67 10*6/MM3 (ref 3.77–5.28)
WBC NRBC COR # BLD: 3.09 10*3/MM3 (ref 3.4–10.8)

## 2021-11-24 PROCEDURE — 36415 COLL VENOUS BLD VENIPUNCTURE: CPT

## 2021-11-24 PROCEDURE — 85025 COMPLETE CBC W/AUTO DIFF WBC: CPT

## 2021-11-24 PROCEDURE — 99214 OFFICE O/P EST MOD 30 MIN: CPT | Performed by: INTERNAL MEDICINE

## 2021-11-24 PROCEDURE — 96372 THER/PROPH/DIAG INJ SC/IM: CPT

## 2021-11-24 PROCEDURE — 25010000002 ROMIPLOSTIM PER 10 MCG: Performed by: INTERNAL MEDICINE

## 2021-11-24 RX ORDER — FOLIC ACID 1 MG/1
1 TABLET ORAL DAILY
Qty: 90 TABLET | Refills: 3 | Status: SHIPPED | OUTPATIENT
Start: 2021-11-24 | End: 2021-12-20 | Stop reason: SDUPTHER

## 2021-11-24 RX ADMIN — ROMIPLOSTIM 65 MCG: 125 INJECTION, POWDER, LYOPHILIZED, FOR SOLUTION SUBCUTANEOUS at 13:30

## 2021-11-24 NOTE — PATIENT INSTRUCTIONS
Romiplostim injection  What is this medicine?  ROMIPLOSTIM (antoinette mi PLOE stim) helps your body make more platelets. This medicine is used to treat low platelets caused by chronic idiopathic thrombocytopenic purpura (ITP) or a bone marrow syndrome caused by radiation sickness.  This medicine may be used for other purposes; ask your health care provider or pharmacist if you have questions.  COMMON BRAND NAME(S): Nplate  What should I tell my health care provider before I take this medicine?  They need to know if you have any of these conditions:  · blood clots  · myelodysplastic syndrome  · an unusual or allergic reaction to romiplostim, mannitol, other medicines, foods, dyes, or preservatives  · pregnant or trying to get pregnant  · breast-feeding  How should I use this medicine?  This medicine is injected under the skin. It is given by a health care provider in a hospital or clinic setting.  A special MedGuide will be given to you before each treatment. Be sure to read this information carefully each time.  Talk to your health care provider about the use of this medicine in children. While it may be prescribed for children as young as newborns for selected conditions, precautions do apply.  Overdosage: If you think you have taken too much of this medicine contact a poison control center or emergency room at once.  NOTE: This medicine is only for you. Do not share this medicine with others.  What if I miss a dose?  Keep appointments for follow-up doses. It is important not to miss your dose. Call your health care provider if you are unable to keep an appointment.  What may interact with this medicine?  Interactions are not expected.  This list may not describe all possible interactions. Give your health care provider a list of all the medicines, herbs, non-prescription drugs, or dietary supplements you use. Also tell them if you smoke, drink alcohol, or use illegal drugs. Some items may interact with your  medicine.  What should I watch for while using this medicine?  Visit your health care provider for regular checks on your progress. You may need blood work done while you are taking this medicine. Your condition will be monitored carefully while you are receiving this medicine. It is important not to miss any appointments.  What side effects may I notice from receiving this medicine?  Side effects that you should report to your doctor or health care professional as soon as possible:  · allergic reactions (skin rash, itching or hives; swelling of the face, lips, or tongue)  · bleeding (bloody or black, tarry stools; red or dark brown urine; spitting up blood or brown material that looks like coffee grounds; red spots on the skin; unusual bruising or bleeding from the eyes, gums, or nose)  · blood clot (chest pain; shortness of breath; pain, swelling, or warmth in the leg)  · stroke (changes in vision; confusion; trouble speaking or understanding; severe headaches; sudden numbness or weakness of the face, arm or leg; trouble walking; dizziness; loss of balance or coordination)  Side effects that usually do not require medical attention (report to your doctor or health care professional if they continue or are bothersome):  · diarrhea  · dizziness  · headache  · joint pain  · muscle pain  · stomach pain  · trouble sleeping  This list may not describe all possible side effects. Call your doctor for medical advice about side effects. You may report side effects to FDA at 1-197-FDA-9751.  Where should I keep my medicine?  This medicine is given in a hospital or clinic. It will not be stored at home.  NOTE: This sheet is a summary. It may not cover all possible information. If you have questions about this medicine, talk to your doctor, pharmacist, or health care provider.  © 2021 Elsevier/Gold Standard (2021-02-01 10:28:13)

## 2021-11-24 NOTE — PROGRESS NOTES
[facility] GROUP HEMATOLOGY & ONCOLOGY  [address]  [place] [address]  [phone]       11/24/2021     PROGRESS NOTE     PATIENT NAME: Ratna Cohen  YOB: 1955  66 y.o. MR: [id number]    PCP: Faith Alcaraz APRN    HPI:   Ms. Ratna Cohen is feeling well today.  She denies evidence of bleeding.  The abdominal pain is not present today.  We reviewed the CBC results.  The hemoglobin improved to 7.9.  She received another dose of Ultomiris on 11/22/2021.  She did not have side effects from that.  We reviewed that the platelet count remains very low at 2 today.  We decided to proceed with Nplate today.     I advised the patient that if she should develop spontaneous bleeding such as oral mucosal bleeding, she should go immediately to the emergency room and should probably receive platelets even though she has ITP and the response to the platelets is not likely to last more than a few hours.  PAST HISTORY:     HEME/ONC HISTORY  Oncology/Hematology History Overview Note     HEME/ONC DX/RX/INVESTIGATIONS SUMMARY:   DX:   PNH (minimal neutropenia, severe anemia, severe thrombocytopenia).   Unremarkable BMBX on 10/4/2021. Anemic symptoms started in 2020, petechiae started in 6/2021.  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 Patient is A-.    10/21/2021, iron overload probably 2nd PRBCs.    10/23/2021, CT abd, undetermined 2.6 cm R adnexal lesion, needs further eval. when patient is more stable.    RX:   9/21/2021, 2U PRBCs and 2 platelet transfusions.  10/15/2021, 1U PRBC.  11/1/2021, 1U single donor PLT. 1U PRBC.  11/5/2021, 2U singe donor PLT.  11/12/2021, 1U PRBC.  11/17/2021 to 11/20/2021, Admitted to [facility], received IVIG (did not respond), 1 U PRBC, 3 PLT transfusions.    Prednisone: 10/15/2021, start 80 mg  QD. 10/22/2021, DC prednisone.  10/22/2021, Meningococcus vaccines 1st dose (Bexsero and Menactra)  Ultomiris, 11/8/2021, 1st dose (loading dose). 11/22/2021 Ultomiris 2nd dose (start rx Q8wks).   11/8/20201, Cipro 500 mg PO BID x 14 days (prophylactic b/o Ultomiris).  11/24/2021, Nplate 1 mcg/Kg SubQ x one dose (anticipated).  11/24/2021, folic acid 1 mg PO daily rxd (b/o chronic hemolysis).    OTHER INFO:   No prior hematological history or significant medical history until 9/21/2021.    Stress test was positive during admission at [facility] in 9/21/2021, cardiac cath not possible because of thrombocytopenia.  Patient never had chest pain, only dyspnea and weakness.    INVESTIGATIONS:   9/21/2021-9:48 AM, WBC 3.4 hemoglobin 7.7 .8 platelets 12 neutrophils 1.1 lymphocytes 1.8.  9/21/2021-17:20 PM, WBC 3.9 hemoglobin 8.3 .1 platelets 23.  9/22/2021, folic acid 25.9 ferritin 365 B12 368 HBsAg negative, HIV negative, hep C antibody negative reticulocyte 4.0%, PT 10.7, INR 1.0 PTT 25.3. Fecal occult blood negative.  9/22/2021, 1:37 AM, WBC 3.6 hemoglobin 7.0 .5 platelets 11 neutrophils 1.06 lymphocytes 2.19.  9/23/2021, WBC 4.0 hemoglobin 10.4 .1 platelets 12.  Neutrophils 1.38 lymphocytes 2.0. BUN 11 creatinine 0.47 calcium 8.5 bilirubin 0.7 ALT 31 AST 26 alk phos 68 albumin 3.7 total protein 7.1.  10/4/201, Diagnosis: BONE MARROW - PERIPHERAL BLOOD SMEAR, ASPIRATE SMEAR, PARTICLE PREPARATION, BIOPSY AND FLOW CYTOMETRY: NORMOCELLULAR MARROW WITH MATURING TRILINEAGE HEMATOPOIESIS, ERYTHROID ATYPIA, AND NO INCREASE IN BLASTS; SEE IMPRESSION.   10/4/2021, Myeloid NGS Panel RESULTS SUMMARY: (Final).Inconclusive: A variant of uncertain significance was detected in this patient's sample.   10/4/2021, PERIPHERAL BLOOD SMEAR ([place]):   CBC : WBC 4.6 k/microL, Hgb 9.5 g/dL, Plt-Ct 13 k/microL,  fL, RDW 20.8%.   A Pitt's stained peripheral smear is reviewed.  Erythrocytes are decreased and are macrocytic and normochromic with anisopoikilocytosis. Polychromasia is present. Neutrophils are adequate in number, and demonstrate no significant morphologic abnormalities. Lymphocytes are adequate in number, and demonstrate no significant morphologic abnormalities. There are no circulating blasts, plasma cells, or abnormal lymphocytes. Platelets are decreased in number, and demonstrate no significant morphologic abnormalities.     10/13/2021, EPO 1,966.  Haptoglobin < 8.  .  Reticulocyte 4%.  10/15/2021, WBC 3.71 hemoglobin 7.6 .3 platelets 11.  10/18/2021, MARTA negative.    10/22/2021, PNH profile:  Comment: Peripheral Blood:   Glycosylphosphatidylinositol (GPI) anchor deficient cells detected (4.48% of granulocytes, 5.93% of monocytes, 0.07% of red blood cells have features of type II red blood cells, 0.50% of red blood cells have features of type III red blood cells).  10/23/2021, CT chest angio:  No evidence of pulmonary embolus. No acute findings.    10/23/2021, CT abd angio:  1.  No major arterial occlusion or flow-limiting stenosis. Nonaneurysmal  abdominal aorta with heavy atherosclerotic calcification.  2.  2.6 cm indeterminate RIGHT adnexal lesion. Recommend correlation  with pelvic ultrasound.  11/8/2021, type & screen, anti-E antibody  11/12/2021, type & screen, anti-E antibody and anti-D antibody  11/12/2021, [id number] Activity, >100.0 (>66.8) (Normal result).  11/19/2021, CT chest/abd at [facility], unremarkable.       MEDICAL HISTORY   has a past medical history of Acid reflux, Anemia, and PNH (paroxysmal nocturnal hemoglobinuria) (HCC).   HEALTH MAINTENANCE ITEMS  Health Maintenance Due   Topic Date Due   • COLORECTAL CANCER SCREENING  Never done   • ANNUAL PHYSICAL  Never done   • TDAP/TD VACCINES (1 - Tdap) Never done   • ZOSTER VACCINE (1 of 2) Never done   • INFLUENZA VACCINE  Never done   • HEPATITIS C SCREENING  Never done   • COVID-19 Vaccine (3  "- Booster for Moderna series) 09/30/2021     <no information>  Last Completed Colonoscopy     This patient has no relevant Health Maintenance data.        Immunization History   Administered Date(s) Administered   • COVID-19 (MODERNA) 1st, 2nd, 3rd Dose Only 03/01/2021, 03/31/2021   • Hepatitis B Vaccine Adult IM 05/10/2007   • Meningococcal B,(Bexsero) 10/22/2021, 11/23/2021   • Meningococcal MCV4P (Menactra) 10/22/2021     Last Completed Mammogram          MAMMOGRAM (Every 2 Years) Next due on 9/1/2023 09/01/2021  Outside Procedure: HC MAMMOGRAM SCREENING BILAT DIGITAL W CAD              FAMILY HISTORY  Family History   Problem Relation Age of Onset   • Diabetes Mother    • Hypertension Mother    • Heart attack Mother    • Hypertension Father    • Heart attack Father        Cancer-related family history is not on file.   SURGICAL HISTORY   has a past surgical history that includes Appendectomy.  SOCIAL HISTORY  Social History     Socioeconomic History   • Marital status:    Tobacco Use   • Smoking status: Never Smoker   • Smokeless tobacco: Never Used   Vaping Use   • Vaping Use: Never used   Substance and Sexual Activity   • Alcohol use: Not Currently   • Drug use: Never   • Sexual activity: Yes     MEDICATIONS:     Current Outpatient Medications   Medication Instructions   • ciprofloxacin (CIPRO) 500 mg, Oral, 2 Times Daily   • eltrombopag (PROMACTA) 50 mg, Oral, Daily, Administer on an empty stomach, 1 hour before or 2 hours after a meal.   • folic acid (FOLVITE) 1 mg, Oral, Daily   • pantoprazole (PROTONIX) 20 mg, Oral, Daily   • potassium chloride (K-DUR,KLOR-CON) 20 MEQ CR tablet 20 mEq, Oral, 2 Times Daily      ALLERGIES:   No Known Allergies  ROS:   Pertinent positive and negative findings as noted in HPI.   PHYSICAL EXAM:   /68   Pulse 101   Temp 97.5 °F (36.4 °C)   Resp 16   Ht 154.9 cm (61\")   Wt 62.4 kg (137 lb 8 oz)   SpO2 99%   Breastfeeding No   BMI 25.98 kg/m²  Body surface " area is 1.61 meters squared.   Pain Score    11/24/21 0953   PainSc: 0-No pain     Alert, oriented x3, cooperative, not in distress.  HEENT: Normocephalic, wearing a facemask.  Lungs: No tachypnea.   Extremities: No ankle edema.  Neurologic: Moves all extremities.    INVESTIGATIONS/LABS:     Lab Results - Last 18 Months   Lab Units 11/24/21  0935 11/22/21  1112 11/15/21  0910 11/12/21  0918 11/09/21  1041 11/08/21  0915 11/05/21  0914 11/05/21  0914 11/01/21  0806 11/01/21  0806 10/27/21  1248 10/23/21  1012 10/21/21  0800 10/21/21  0800 10/18/21  1018 10/15/21  1015 10/13/21  1151 09/27/21  0942 09/27/21  0942   WBC 10*3/mm3 3.09* 2.94* 3.48 3.45 3.61 2.74*   < > 3.40   < > 4.36   < > 9.69   < > 8.71   < > 3.71  --    < > 5.24   HEMOGLOBIN g/dL 7.9* 7.7* 7.9* 6.9* 7.3* 7.3*   < > 8.1*   < > 7.4*   < > 8.9*   < > 9.3*   < > 7.6*  --    < > 9.3*   HEMATOCRIT % 23.4* 22.8* 22.9* 20.7* 21.3* 20.9*   < > 24.1*   < > 21.2*   < > 26.5*   < > 26.4*   < > 22.4*  --    < > 27.4*   MCV fL 87.6 88.4 94.2 96.3 96.8 95.9   < > 95.6   < > 100.0*   < > 98.9*   < > 98.1*   < > 102.3*  --    < > 101.1*   PLATELETS 10*3/mm3 2* 2* 4* 8* 8* 6*   < > 7*   < > 10*   < > 13*   < > 13*   < > 11*  --    < > 18*   IMM GRAN % %  --   --   --  0.3  --  0.4  --   --   --  0.2  --   --   --   --   --  0.3  --   --   --    NEUTROS ABS 10*3/mm3 1.18* 0.45* 1.29* 1.29* 1.19* 1.10*   < > 1.09*   < > 1.79   < > 4.67  --  2.40   < > 1.33* 2.43   < > 3.09   LYMPHS ABS 10*3/mm3 1.59  --  1.84 1.77  --  1.32  --  1.81  --  2.14   < > 4.05*  --   --    < > 1.96 2.05  --   --    MONOS ABS 10*3/mm3 0.30  --  0.32 0.34  --  0.27  --  0.44  --  0.36   < > 0.84  --   --    < > 0.37 0.21*  --   --    EOS ABS 10*3/mm3 0.01  --  0.02 0.03 0.11 0.04  --  0.06   < > 0.05   < > 0.06  --   --    < > 0.03 0.04  --   --    EOSINOPHIL ABS %  --   --   --   --   --   --   --   --   --   --   --   --   --   --   --   --  0.9  --   --    BASOS ABS 10*3/mm3 0.00  --  0.00  0.01  --  0.00  --  0.00  --  0.01   < > 0.01  --   --    < > 0.01  --   --   --    IMMATURE GRANS (ABS) 10*3/mm3  --   --   --  0.01  --  0.01  --   --   --  0.01  --   --   --   --   --  0.01  --   --   --    NRBC /100 WBC  --   --   --  0.0  --  0.0  --   --   --  0.0  --   --   --   --   --  0.5*  --   --   --    NEUTROPHIL % %  --  15.2*  --   --  33.0*  --   --   --   --   --   --   --   --  24.5*  --   --   --   --  56.0   MONOCYTES % %  --  10.1  --   --  9.0  --   --   --   --   --   --   --   --  4.1*  --   --  4.3  --  7.0   ATYP LYMPH % %  --  7.1*  --   --   --   --   --   --   --   --   --   --   --   --   --   --   --   --   --    ANISOCYTOSIS   --  Slight/1+  --   --  Mod/2+  --   --   --   --   --   --  Slight/1+  --  Slight/1+  --   --  3+  --  Slight/1+    < > = values in this interval not displayed.       Lab Results - Last 18 Months   Lab Units 11/22/21  1112 11/15/21  0910 11/12/21  0918 11/09/21  1041 11/08/21  0915 11/05/21  0914   BUN mg/dL 13 10 9 7* 8 7*   CREATININE mg/dL 0.57 0.50* 0.56* 0.46* 0.44* 0.55*   GLUCOSE mg/dL 119* 130* 128* 101* 142* 103*   SODIUM mmol/L 136 137 137 137 137 139   POTASSIUM mmol/L 3.6 3.7 3.5 3.8 3.6 3.9   CO2 mmol/L 25.0 24.0 23.0 26.0 25.0 26.0   CHLORIDE mmol/L 101 102 101 102 103 103   ANION GAP mmol/L 10.0 11.0 13.0 9.0 9.0 10.0   BUN / CREAT RATIO  22.8 20.0 16.1 15.2 18.2 12.7   CALCIUM mg/dL 9.0 8.9 8.9 9.2 9.2 9.0   EGFR IF NONAFRICN AM mL/min/1.73 107 124 109 137 144 111   ALK PHOS U/L 57 68 73 75 69 70   TOTAL PROTEIN g/dL 9.2* 7.0 7.3 7.5 7.2 7.2   ALT (SGPT) U/L 25 25 29 35* 37* 29   AST (SGOT) U/L 20 20 22 24 29 23   BILIRUBIN mg/dL 0.8 0.6 0.7 0.7 0.7 0.7   ALBUMIN g/dL 4.00 4.40 4.50 4.60 4.40 4.30   GLOBULIN gm/dL 5.2 2.6 2.8 2.9 2.8 2.9       Lab Results - Last 18 Months   Lab Units 11/22/21  1112 11/17/21  0810 11/15/21  0910 11/12/21  0918 10/21/21  0800 10/13/21  1008   LDH U/L 223* 222* 230* 247* 270* 244*       Lab Results - Last 18  Months   Lab Units 10/18/21  1018 10/13/21  1151 10/13/21  1008 10/01/21  1116 09/27/21  0942   IRON mcg/dL 230*  --   --  163 141   TIBC mcg/dL 282*  --   --  222* 280*   IRON SATURATION % 82*  --   --  73 50   FERRITIN ng/mL 872.00*  --   --  418* 472.40*   VITAMIN B 12 pg/mL 665  --  753  --  534   FOLATE ng/mL 17.10  --  16.6  --  >20.00   TSH mcunit/mL  --  1.291  --   --   --        CT Head Without Contrast    Result Date: 10/23/2021   1.  No acute intracranial finding. 2.  Mild paranasal sinus mucosal thickening. 3.  Chronic microvascular ischemic white matter change. This report was finalized on 10/23/2021 11:54 by Dr. Justo Chaves MD.    CT Angiogram Abdomen Pelvis    Result Date: 10/23/2021   1.  No major arterial occlusion or flow-limiting stenosis. Nonaneurysmal abdominal aorta with heavy atherosclerotic calcification. 2.  2.6 cm indeterminate RIGHT adnexal lesion. Recommend correlation with pelvic ultrasound. This report was finalized on 10/23/2021 13:17 by Dr. Justo Chaves MD.    CT Angiogram Chest    Result Date: 10/23/2021   No evidence of pulmonary embolus. No acute findings. This report was finalized on 10/23/2021 13:12 by Dr. Justo Chaves MD.      ASSESSMENT:   PNH.  She has slightly improved hemoglobin today. 7.9.  In the past she only had progressively worsening of anemia.  Even though the PNH clone is very small, it is evident that she has had at least a modest response to Ultomiris, with improvement in the anemia and the hemolytic markers.    ITP.  Fortunately, there is no evidence of bleeding despite the very low platelet count.    PLAN/RECOMMENDATIONS:   Nplate today (1 mcg/kg SubQ).    DC Nplate after today's dose.    Start Promacta 50 mg by mouth daily on 11/29/2021.    VST on 11/29/2021, labs 1h PTV.    DX/ORDERS:   Diagnoses and all orders for this visit:    1. PNH (paroxysmal nocturnal hemoglobinuria) (HCC) (Primary)  -     CBC Auto Differential; Future  -     Haptoglobin;  Future  -     Comprehensive Metabolic Panel; Future  -     Vitamin B12 & Folate; Future  -     Type & Screen; Future  -     Lactate Dehydrogenase; Future  -     Reticulocytes; Future    2. Acute ITP (HCC)    Other orders  -     folic acid (FOLVITE) 1 MG tablet; Take 1 tablet by mouth Daily.  Dispense: 90 tablet; Refill: 3        Future Appointments   Date Time Provider Department Center   11/24/2021 11:30 AM CHAIR 11  [facility] OP INFU ONC  PAD OIONC PAD   11/29/2021  9:30 AM  [facility] CANCER CTR LAB  PAD CCLAB PAD   11/29/2021 10:30 AM Devante Galvez MD [facility] ONC [facility] PAD   12/1/2021  2:45 PM CHAIR 13  [facility] OP INFU ONC  [facility] OIONC PAD   12/8/2021  1:00 PM CHAIR 14  [facility] OP INFU ONC  PAD OIONC PAD   12/15/2021  1:00 PM CHAIR 13  [facility] OP INFU ONC  PAD OIONC PAD   12/21/2021  1:00 PM CHAIR 13 [facility] OP INFU ONC  PAD OIONC PAD   12/22/2021  2:00 PM CHAIR 11  [facility] OP INFU ONC  [facility] OIONC PAD   12/29/2021  1:00 PM CHAIR 11  PAD OP INFU ONC  PAD OIONC PAD   1/17/2022 11:45 AM  [facility] CANCER CTR LAB  PAD CCLAB PAD   1/17/2022 12:15 PM CHAIR 12  [facility] OP INFU ONC  [facility] OIONC PAD        Devante Galvez MD  11/24/2021    cc:  Faith Alcaraz APRN

## 2021-11-24 NOTE — TELEPHONE ENCOUNTER
Received call from the lab with a critical for this patient, platelets of 2. Verbally gave this information to Dr. Galvez prior to seeing patient for office appointment.    jv

## 2021-11-29 ENCOUNTER — LAB (OUTPATIENT)
Dept: LAB | Facility: HOSPITAL | Age: 66
End: 2021-11-29

## 2021-11-29 ENCOUNTER — OFFICE VISIT (OUTPATIENT)
Dept: ONCOLOGY | Facility: CLINIC | Age: 66
End: 2021-11-29

## 2021-11-29 ENCOUNTER — TELEPHONE (OUTPATIENT)
Dept: ONCOLOGY | Facility: CLINIC | Age: 66
End: 2021-11-29

## 2021-11-29 VITALS
WEIGHT: 138.4 LBS | SYSTOLIC BLOOD PRESSURE: 126 MMHG | BODY MASS INDEX: 26.13 KG/M2 | DIASTOLIC BLOOD PRESSURE: 70 MMHG | RESPIRATION RATE: 16 BRPM | OXYGEN SATURATION: 99 % | TEMPERATURE: 96.4 F | HEIGHT: 61 IN | HEART RATE: 102 BPM

## 2021-11-29 DIAGNOSIS — D69.3 ACUTE ITP (HCC): ICD-10-CM

## 2021-11-29 DIAGNOSIS — D59.5 PNH (PAROXYSMAL NOCTURNAL HEMOGLOBINURIA) (HCC): Primary | ICD-10-CM

## 2021-11-29 DIAGNOSIS — D59.5 PNH (PAROXYSMAL NOCTURNAL HEMOGLOBINURIA) (HCC): ICD-10-CM

## 2021-11-29 LAB
ALBUMIN SERPL-MCNC: 4.1 G/DL (ref 3.5–5.2)
ALBUMIN/GLOB SERPL: 1 G/DL
ALP SERPL-CCNC: 59 U/L (ref 39–117)
ALT SERPL W P-5'-P-CCNC: 31 U/L (ref 1–33)
ANION GAP SERPL CALCULATED.3IONS-SCNC: 10 MMOL/L (ref 5–15)
ANISOCYTOSIS BLD QL: ABNORMAL
AST SERPL-CCNC: 24 U/L (ref 1–32)
BASOPHILS # BLD MANUAL: 0.02 10*3/MM3 (ref 0–0.2)
BASOPHILS NFR BLD MANUAL: 1 % (ref 0–1.5)
BILIRUB SERPL-MCNC: 0.5 MG/DL (ref 0–1.2)
BUN SERPL-MCNC: 10 MG/DL (ref 8–23)
BUN/CREAT SERPL: 19.6 (ref 7–25)
CALCIUM SPEC-SCNC: 9.4 MG/DL (ref 8.6–10.5)
CHLORIDE SERPL-SCNC: 101 MMOL/L (ref 98–107)
CO2 SERPL-SCNC: 24 MMOL/L (ref 22–29)
CREAT SERPL-MCNC: 0.51 MG/DL (ref 0.57–1)
DEPRECATED RDW RBC AUTO: 57.1 FL (ref 37–54)
EOSINOPHIL # BLD MANUAL: 0.02 10*3/MM3 (ref 0–0.4)
EOSINOPHIL NFR BLD MANUAL: 1 % (ref 0.3–6.2)
ERYTHROCYTE [DISTWIDTH] IN BLOOD BY AUTOMATED COUNT: 19.1 % (ref 12.3–15.4)
FOLATE SERPL-MCNC: >20 NG/ML (ref 4.78–24.2)
GFR SERPL CREATININE-BSD FRML MDRD: 121 ML/MIN/1.73
GLOBULIN UR ELPH-MCNC: 4.3 GM/DL
GLUCOSE SERPL-MCNC: 164 MG/DL (ref 65–99)
HAPTOGLOB SERPL-MCNC: 64 MG/DL (ref 30–200)
HCT VFR BLD AUTO: 21.6 % (ref 34–46.6)
HGB BLD-MCNC: 7.3 G/DL (ref 12–15.9)
HYPOCHROMIA BLD QL: ABNORMAL
LDH SERPL-CCNC: 206 U/L (ref 135–214)
LYMPHOCYTES # BLD MANUAL: 1.45 10*3/MM3 (ref 0.7–3.1)
LYMPHOCYTES NFR BLD MANUAL: 2 % (ref 5–12)
MCH RBC QN AUTO: 29.6 PG (ref 26.6–33)
MCHC RBC AUTO-ENTMCNC: 33.8 G/DL (ref 31.5–35.7)
MCV RBC AUTO: 87.4 FL (ref 79–97)
MONOCYTES # BLD: 0.04 10*3/MM3 (ref 0.1–0.9)
NEUTROPHILS # BLD AUTO: 0.68 10*3/MM3 (ref 1.7–7)
NEUTROPHILS NFR BLD MANUAL: 29.6 % (ref 42.7–76)
NEUTS BAND NFR BLD MANUAL: 1 % (ref 0–5)
PLATELET # BLD AUTO: 2 10*3/MM3 (ref 140–450)
POTASSIUM SERPL-SCNC: 3.6 MMOL/L (ref 3.5–5.2)
PROT SERPL-MCNC: 8.4 G/DL (ref 6–8.5)
RBC # BLD AUTO: 2.47 10*6/MM3 (ref 3.77–5.28)
RETICS # AUTO: 0.05 10*6/MM3 (ref 0.02–0.13)
RETICS/RBC NFR AUTO: 1.99 % (ref 0.7–1.9)
SMALL PLATELETS BLD QL SMEAR: ABNORMAL
SODIUM SERPL-SCNC: 135 MMOL/L (ref 136–145)
VARIANT LYMPHS NFR BLD MANUAL: 65.3 % (ref 19.6–45.3)
VIT B12 BLD-MCNC: 603 PG/ML (ref 211–946)
WBC MORPH BLD: NORMAL
WBC NRBC COR # BLD: 2.22 10*3/MM3 (ref 3.4–10.8)

## 2021-11-29 PROCEDURE — 83010 ASSAY OF HAPTOGLOBIN QUANT: CPT

## 2021-11-29 PROCEDURE — 85025 COMPLETE CBC W/AUTO DIFF WBC: CPT

## 2021-11-29 PROCEDURE — 80053 COMPREHEN METABOLIC PANEL: CPT

## 2021-11-29 PROCEDURE — 82607 VITAMIN B-12: CPT

## 2021-11-29 PROCEDURE — 82746 ASSAY OF FOLIC ACID SERUM: CPT

## 2021-11-29 PROCEDURE — 36415 COLL VENOUS BLD VENIPUNCTURE: CPT

## 2021-11-29 PROCEDURE — 85045 AUTOMATED RETICULOCYTE COUNT: CPT

## 2021-11-29 PROCEDURE — 85007 BL SMEAR W/DIFF WBC COUNT: CPT

## 2021-11-29 PROCEDURE — 83615 LACTATE (LD) (LDH) ENZYME: CPT

## 2021-11-29 PROCEDURE — 99214 OFFICE O/P EST MOD 30 MIN: CPT | Performed by: INTERNAL MEDICINE

## 2021-11-29 NOTE — TELEPHONE ENCOUNTER
CRITICAL LAB VALUE  Received call from [facility]  Lab with CRITICAL LAB VALUE    PLT: 2  This information was given to Becki COTTON to relay to Dr Galvez to address.

## 2021-11-29 NOTE — PROGRESS NOTES
[facility] ONC [facility] GROUP HEMATOLOGY & ONCOLOGY  [address]  [facility] [address]  [phone]       11/29/2021     PROGRESS NOTE     PATIENT NAME: Ratna Cohen  YOB: 1955  66 y.o. MR: [id number]    PCP: Faith Alcaraz APRN    HPI:   Ms. Ratna Cohen is here for follow-up accompanied by her , as usual.  She is feeling well today.  She reports that did well over the Thanksgiving holiday.  She reports very minimal skin bruisability.  There is no report of abnormal bleeding.  She is asymptomatic today.  Again we reviewed the labs.  We noted that there is stable anemia and stable severe thrombocytopenia.  We reviewed that there was no response to Nplate so far.  We decided to change the therapy for ITP to Promacta.  PAST HISTORY:     HEME/ONC HISTORY  Oncology/Hematology History Overview Note     HEME/ONC DX/RX/INVESTIGATIONS SUMMARY:   DX:   PNH (minimal neutropenia, severe anemia, severe thrombocytopenia).   Unremarkable BMBX on 10/4/2021. Anemic symptoms started in 2020, petechiae started in 6/2021.  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 Patient is A-.    10/21/2021, iron overload probably 2nd PRBCs.    10/23/2021, CT abd, undetermined 2.6 cm R adnexal lesion, needs further eval. when patient is more stable.    RX:   9/21/2021, 2U PRBCs and 2 platelet transfusions.  10/15/2021, 1U PRBC.  11/1/2021, 1U single donor PLT. 1U PRBC.  11/5/2021, 2U singe donor PLT.  11/12/2021, 1U PRBC.  11/17/2021 to 11/20/2021, Admitted to [place], received IVIG (did not respond), 1 U PRBC, 3 PLT transfusions.    Prednisone: 10/15/2021, start 80 mg QD. 10/22/2021, DC prednisone.  10/22/2021, Meningococcus vaccines 1st dose (Bexsero and Menactra)  Ultomiris, 11/8/2021, 1st dose (loading dose). 11/22/2021 Ultomiris 2nd dose (start rx  "[id number]).   11/8/20201, Cipro 500 mg PO BID x 14 days (prophylactic b/o Ultomiris).  11/24/2021, Nplate 1 mcg/Kg SubQ x one dose (anticipated).  11/24/2021, folic acid 1 mg PO daily rxd (b/o chronic hemolysis).    OTHER INFO:   No prior hematological history or significant medical history until 9/21/2021.    Stress test was positive during admission at [facility] in 9/21/2021, cardiac cath not possible because of thrombocytopenia.  Patient never had chest pain, only dyspnea and weakness.      INFO FROM [place]:  \"Associated attestation - Felipe Maciel MD - 11/18/2021 12:10 PM CST Formatting of this note might be different from the original. Agree with resident interpretation of serologic testing.  Given stated evaluation of Naveen's syndrome evaluation, note that the patient's plasma did not react with their own RBCs, thus a negative auto-control. This is not consistent with immune mediated red blood cell destruction.\"     INVESTIGATIONS:   9/21/2021-9:48 AM, WBC 3.4 hemoglobin 7.7 .8 platelets 12 neutrophils 1.1 lymphocytes 1.8.  9/21/2021-17:20 PM, WBC 3.9 hemoglobin 8.3 .1 platelets 23.  9/22/2021, folic acid 25.9 ferritin 365 B12 368 HBsAg negative, HIV negative, hep C antibody negative reticulocyte 4.0%, PT 10.7, INR 1.0 PTT 25.3. Fecal occult blood negative.  9/22/2021, 1:37 AM, WBC 3.6 hemoglobin 7.0 .5 platelets 11 neutrophils 1.06 lymphocytes 2.19.  9/23/2021, WBC 4.0 hemoglobin 10.4 .1 platelets 12.  Neutrophils 1.38 lymphocytes 2.0. BUN 11 creatinine 0.47 calcium 8.5 bilirubin 0.7 ALT 31 AST 26 alk phos 68 albumin 3.7 total protein 7.1.  10/4/201, Diagnosis: BONE MARROW - PERIPHERAL BLOOD SMEAR, ASPIRATE SMEAR, PARTICLE PREPARATION, BIOPSY AND FLOW CYTOMETRY: NORMOCELLULAR MARROW WITH MATURING TRILINEAGE HEMATOPOIESIS, ERYTHROID ATYPIA, AND NO INCREASE IN BLASTS; SEE IMPRESSION.   10/4/2021, Myeloid NGS Panel RESULTS SUMMARY: (Final).Inconclusive: A " variant of uncertain significance was detected in this patient's sample.   10/4/2021, PERIPHERAL BLOOD SMEAR ([facility]):   CBC : WBC 4.6 k/microL, Hgb 9.5 g/dL, Plt-Ct 13 k/microL,  fL, RDW 20.8%.   A Pitt's stained peripheral smear is reviewed. Erythrocytes are decreased and are macrocytic and normochromic with anisopoikilocytosis. Polychromasia is present. Neutrophils are adequate in number, and demonstrate no significant morphologic abnormalities. Lymphocytes are adequate in number, and demonstrate no significant morphologic abnormalities. There are no circulating blasts, plasma cells, or abnormal lymphocytes. Platelets are decreased in number, and demonstrate no significant morphologic abnormalities.     10/13/2021, EPO 1,966.  Haptoglobin < 8.  .  Reticulocyte 4%.  10/15/2021, WBC 3.71 hemoglobin 7.6 .3 platelets 11.  10/18/2021, MARTA negative.    10/22/2021, PNH profile:  Comment: Peripheral Blood:   Glycosylphosphatidylinositol (GPI) anchor deficient cells detected (4.48% of granulocytes, 5.93% of monocytes, 0.07% of red blood cells have features of type II red blood cells, 0.50% of red blood cells have features of type III red blood cells).  10/23/2021, CT chest angio:  No evidence of pulmonary embolus. No acute findings.    10/23/2021, CT abd angio:  1.  No major arterial occlusion or flow-limiting stenosis. Nonaneurysmal  abdominal aorta with heavy atherosclerotic calcification.  2.  2.6 cm indeterminate RIGHT adnexal lesion. Recommend correlation  with pelvic ultrasound.  11/8/2021, type & screen, anti-E antibody  11/12/2021, type & screen, anti-E antibody and anti-D antibody  11/12/2021, [id number] Activity, >100.0 (>66.8) (Normal result).  11/19/2021, CT chest/abd at [facility], unremarkable.       MEDICAL HISTORY   has a past medical history of Acid reflux, Anemia, and PNH (paroxysmal nocturnal hemoglobinuria) (HCC).   HEALTH MAINTENANCE ITEMS  Health Maintenance Due   Topic Date  Due   • COLORECTAL CANCER SCREENING  Never done   • ANNUAL PHYSICAL  Never done   • TDAP/TD VACCINES (1 - Tdap) Never done   • ZOSTER VACCINE (1 of 2) Never done   • INFLUENZA VACCINE  Never done   • HEPATITIS C SCREENING  Never done   • COVID-19 Vaccine (3 - Booster for Moderna series) 09/30/2021     <no information>  Last Completed Colonoscopy     This patient has no relevant Health Maintenance data.        Immunization History   Administered Date(s) Administered   • COVID-19 (MODERNA) 1st, 2nd, 3rd Dose Only 03/01/2021, 03/31/2021   • Hepatitis B Vaccine Adult IM 05/10/2007   • Meningococcal B,(Bexsero) 10/22/2021, 11/23/2021   • Meningococcal MCV4P (Menactra) 10/22/2021     Last Completed Mammogram          MAMMOGRAM (Every 2 Years) Next due on 9/1/2023 09/01/2021  Outside Procedure: HC MAMMOGRAM SCREENING BILAT DIGITAL W CAD              FAMILY HISTORY  Family History   Problem Relation Age of Onset   • Diabetes Mother    • Hypertension Mother    • Heart attack Mother    • Hypertension Father    • Heart attack Father        Cancer-related family history is not on file.   SURGICAL HISTORY   has a past surgical history that includes Appendectomy.  SOCIAL HISTORY  Social History     Socioeconomic History   • Marital status:    Tobacco Use   • Smoking status: Never Smoker   • Smokeless tobacco: Never Used   Vaping Use   • Vaping Use: Never used   Substance and Sexual Activity   • Alcohol use: Not Currently   • Drug use: Never   • Sexual activity: Yes     MEDICATIONS:     Current Outpatient Medications   Medication Instructions   • ciprofloxacin (CIPRO) 500 mg, Oral, 2 Times Daily   • eltrombopag (PROMACTA) 50 mg, Oral, Daily, Administer on an empty stomach, 1 hour before or 2 hours after a meal.   • folic acid (FOLVITE) 1 mg, Oral, Daily   • pantoprazole (PROTONIX) 20 mg, Oral, Daily   • potassium chloride (K-DUR,KLOR-CON) 20 MEQ CR tablet 20 mEq, Oral, 2 Times Daily      ALLERGIES:   No Known  "Allergies  ROS:   Pertinent positive and negative findings as noted in HPI.   PHYSICAL EXAM:   /70   Pulse 102   Temp 96.4 °F (35.8 °C)   Resp 16   Ht 154.9 cm (61\")   Wt 62.8 kg (138 lb 6.4 oz)   SpO2 99%   Breastfeeding No   BMI 26.15 kg/m²  Body surface area is 1.62 meters squared.   Pain Score    11/29/21 0949   PainSc:   2     Alert, oriented x3, cooperative, not in distress.  HEENT: Normocephalic, wearing a facemask.  Neck: No lymphadenopathy.  Lungs: No tachypnea. Clear bilaterally.  Heart: Regular rate and rhythm.  Abdomen: Soft, nontender, no palpable organomegaly.  Extremities: No ankle edema.  Neurologic: Moves all extremities.    INVESTIGATIONS/LABS:     Lab Results - Last 18 Months   Lab Units 11/29/21  0940 11/24/21  0935 11/22/21  1112 11/15/21  0910 11/12/21  0918 11/09/21  1041 11/08/21  0915 11/05/21  0914 11/05/21  0914 11/01/21  0806 11/01/21  0806 10/27/21  1248 10/23/21  1012 10/21/21  0800 10/21/21  0800 10/18/21  1018 10/15/21  1015 10/13/21  1151 10/13/21  1008 10/13/21  1008 10/01/21  1116 09/27/21  0942 09/27/21  0942   WBC 10*3/mm3 2.22* 3.09* 2.94* 3.48 3.45 3.61 2.74*   < > 3.40   < > 4.36   < > 9.69   < > 8.71   < > 3.71  --   --   --   --    < > 5.24   HEMOGLOBIN g/dL 7.3* 7.9* 7.7* 7.9* 6.9* 7.3* 7.3*   < > 8.1*   < > 7.4*   < > 8.9*   < > 9.3*   < > 7.6*  --   --   --   --    < > 9.3*   HEMATOCRIT % 21.6* 23.4* 22.8* 22.9* 20.7* 21.3* 20.9*   < > 24.1*   < > 21.2*   < > 26.5*   < > 26.4*   < > 22.4*  --   --   --   --    < > 27.4*   MCV fL 87.4 87.6 88.4 94.2 96.3 96.8 95.9   < > 95.6   < > 100.0*   < > 98.9*   < > 98.1*   < > 102.3*  --   --   --   --    < > 101.1*   PLATELETS 10*3/mm3 2* 2* 2* 4* 8* 8* 6*   < > 7*   < > 10*   < > 13*   < > 13*   < > 11*  --   --   --   --    < > 18*   IMM GRAN % %  --   --   --   --  0.3  --  0.4  --   --   --  0.2  --   --   --   --   --  0.3  --   --   --   --   --   --    NEUTROS ABS 10*3/mm3 0.68* 1.18* 0.45* 1.29* 1.29* 1.19* " 1.10*   < > 1.09*   < > 1.79   < > 4.67   < > 2.40   < > 1.33* 2.43   < > 2.27   < >   < > 3.09   LYMPHS ABS 10*3/mm3  --  1.59  --  1.84 1.77  --  1.32  --  1.81  --  2.14   < > 4.05*  --   --    < > 1.96 2.05   < > 2.03   < >  --   --    MONOS ABS 10*3/mm3  --  0.30  --  0.32 0.34  --  0.27  --  0.44  --  0.36   < > 0.84  --   --    < > 0.37 0.21*   < > 0.37   < >  --   --    EOS ABS 10*3/mm3 0.02 0.01  --  0.02 0.03 0.11 0.04   < > 0.06   < > 0.05   < > 0.06  --   --    < > 0.03 0.04   < > 0.04  --   --   --    EOSINOPHIL ABS %  --   --   --   --   --   --   --   --   --   --   --   --   --   --   --   --   --  0.9   < > 0.9  --   --   --    BASOS ABS 10*3/mm3 0.02 0.00  --  0.00 0.01  --  0.00  --  0.00   < > 0.01   < > 0.01  --   --    < > 0.01  --   --   --   --   --   --    IMMATURE GRANS (ABS) 10*3/mm3  --   --   --   --  0.01  --  0.01  --   --   --  0.01  --   --   --   --   --  0.01  --   --   --   --   --   --    NRBC /100 WBC  --   --   --   --  0.0  --  0.0  --   --   --  0.0  --   --   --   --   --  0.5*  --   --   --   --   --   --    NEUTROPHIL % % 29.6*  --  15.2*  --   --  33.0*  --   --   --   --   --   --   --   --  24.5*  --   --   --   --   --   --   --  56.0   MONOCYTES % % 2.0*  --  10.1  --   --  9.0  --   --   --   --   --   --   --   --  4.1*  --   --  4.3  --  7.9   < >  --  7.0   BASOPHIL % % 1.0  --   --   --   --   --   --   --   --   --   --   --   --   --   --   --   --   --   --   --   --   --   --    ATYP LYMPH % %  --   --  7.1*  --   --   --   --   --   --   --   --   --   --   --   --   --   --   --   --   --   --   --   --    ANISOCYTOSIS  Slight/1+  --  Slight/1+  --   --  Mod/2+  --   --   --   --   --   --  Slight/1+  --  Slight/1+  --   --  3+   < > 1+   < >  --  Slight/1+    < > = values in this interval not displayed.       Lab Results - Last 18 Months   Lab Units 11/29/21  0940 11/22/21  1112 11/15/21  0910 11/12/21  0918 11/09/21  1041 11/08/21  0915   BUN mg/dL 10 13  10 9 7* 8   CREATININE mg/dL 0.51* 0.57 0.50* 0.56* 0.46* 0.44*   GLUCOSE mg/dL 164* 119* 130* 128* 101* 142*   SODIUM mmol/L 135* 136 137 137 137 137   POTASSIUM mmol/L 3.6 3.6 3.7 3.5 3.8 3.6   CO2 mmol/L 24.0 25.0 24.0 23.0 26.0 25.0   CHLORIDE mmol/L 101 101 102 101 102 103   ANION GAP mmol/L 10.0 10.0 11.0 13.0 9.0 9.0   BUN / CREAT RATIO  19.6 22.8 20.0 16.1 15.2 18.2   CALCIUM mg/dL 9.4 9.0 8.9 8.9 9.2 9.2   EGFR IF NONAFRICN AM mL/min/1.73 121 107 124 109 137 144   ALK PHOS U/L 59 57 68 73 75 69   TOTAL PROTEIN g/dL 8.4 9.2* 7.0 7.3 7.5 7.2   ALT (SGPT) U/L 31 25 25 29 35* 37*   AST (SGOT) U/L 24 20 20 22 24 29   BILIRUBIN mg/dL 0.5 0.8 0.6 0.7 0.7 0.7   ALBUMIN g/dL 4.10 4.00 4.40 4.50 4.60 4.40   GLOBULIN gm/dL 4.3 5.2 2.6 2.8 2.9 2.8       Lab Results - Last 18 Months   Lab Units 11/29/21  0940 11/22/21  1112 11/17/21  0810 11/15/21  0910 11/12/21  0918 10/21/21  0800   LDH U/L 206 223* 222* 230* 247* 270*       Lab Results - Last 18 Months   Lab Units 10/18/21  1018 10/13/21  1151 10/13/21  1008 10/01/21  1116 09/27/21  0942   IRON mcg/dL 230*  --   --  163 141   TIBC mcg/dL 282*  --   --  222* 280*   IRON SATURATION % 82*  --   --  73 50   FERRITIN ng/mL 872.00*  --   --  418* 472.40*   VITAMIN B 12 pg/mL 665  --  753  --  534   FOLATE ng/mL 17.10  --  16.6  --  >20.00   TSH mcunit/mL  --  1.291  --   --   --        CT Head Without Contrast    Result Date: 10/23/2021   1.  No acute intracranial finding. 2.  Mild paranasal sinus mucosal thickening. 3.  Chronic microvascular ischemic white matter change. This report was finalized on 10/23/2021 11:54 by Dr. Justo Chaves MD.    CT Angiogram Abdomen Pelvis    Result Date: 10/23/2021   1.  No major arterial occlusion or flow-limiting stenosis. Nonaneurysmal abdominal aorta with heavy atherosclerotic calcification. 2.  2.6 cm indeterminate RIGHT adnexal lesion. Recommend correlation with pelvic ultrasound. This report was finalized on 10/23/2021 13:17 by   Justo Chaves MD.    CT Angiogram Chest    Result Date: 10/23/2021   No evidence of pulmonary embolus. No acute findings. This report was finalized on 10/23/2021 13:12 by Dr. Justo Chaves MD.      ASSESSMENT:   PNH, the patient has had so far consistent improvement in LDH, reticulocyte count, haptoglobin level and transfusion requirements.  She remains moderately anemic with hemoglobin of 7.3 today but she is asymptomatic.  There is no indication for transfusions today.    ITP, refractory to steroids, IVIG, Nplate.    PLAN/RECOMMENDATIONS:   Continue observation of the hemolysis.    Discontinue Nplate.  Start Promacta, 50 mg daily.    VST 12/2/2021, labs 1h PTV.    I will try to minimize the amount of blood drawn for diagnostic test.  Since she has consistent evidence of improvement of the hemolytic markers for the next labs I will request CBC and type and screen only.  DX/ORDERS:   Diagnoses and all orders for this visit:    1. PNH (paroxysmal nocturnal hemoglobinuria) (HCC) (Primary)  -     CBC Auto Differential; Future  -     Type & Screen; Future    2. Acute ITP (HCC)  -     CBC Auto Differential; Future  -     Type & Screen; Future        Future Appointments   Date Time Provider Department Center   12/2/2021 10:30 AM [facility] CANCER CTR LAB [facility] P[facility]   12/2/2021 11:30 AM Devante Galvez MD [facility] ONC PAD [facility]   12/21/2021  1:00 PM CHAIR 13 [facility] OP INFU ONC [facility] PAD   1/17/2022 11:45 AM [facility] CANCER CTR LAB [facility] CCL[facility] PAD   1/17/2022 12:15 PM CHAIR 12 [facility] OP INFU ONC [facility]        Devante Galvez MD  11/29/2021    cc: No ref. provider found, Faith Alcaraz, DEVIN

## 2021-12-01 ENCOUNTER — APPOINTMENT (OUTPATIENT)
Dept: ONCOLOGY | Facility: HOSPITAL | Age: 66
End: 2021-12-01

## 2021-12-02 ENCOUNTER — INFUSION (OUTPATIENT)
Dept: ONCOLOGY | Facility: HOSPITAL | Age: 66
End: 2021-12-02

## 2021-12-02 ENCOUNTER — OFFICE VISIT (OUTPATIENT)
Dept: ONCOLOGY | Facility: CLINIC | Age: 66
End: 2021-12-02

## 2021-12-02 ENCOUNTER — LAB (OUTPATIENT)
Dept: LAB | Facility: HOSPITAL | Age: 66
End: 2021-12-02

## 2021-12-02 VITALS
OXYGEN SATURATION: 98 % | DIASTOLIC BLOOD PRESSURE: 78 MMHG | HEIGHT: 61 IN | SYSTOLIC BLOOD PRESSURE: 132 MMHG | TEMPERATURE: 97.8 F | BODY MASS INDEX: 26.06 KG/M2 | HEART RATE: 101 BPM | WEIGHT: 138 LBS | RESPIRATION RATE: 16 BRPM

## 2021-12-02 VITALS
HEART RATE: 94 BPM | WEIGHT: 137 LBS | TEMPERATURE: 98.8 F | DIASTOLIC BLOOD PRESSURE: 50 MMHG | HEIGHT: 61 IN | BODY MASS INDEX: 25.86 KG/M2 | OXYGEN SATURATION: 100 % | SYSTOLIC BLOOD PRESSURE: 142 MMHG | RESPIRATION RATE: 18 BRPM

## 2021-12-02 DIAGNOSIS — D69.3 ACUTE ITP (HCC): ICD-10-CM

## 2021-12-02 DIAGNOSIS — D59.5 PNH (PAROXYSMAL NOCTURNAL HEMOGLOBINURIA) (HCC): ICD-10-CM

## 2021-12-02 DIAGNOSIS — D59.5 PNH (PAROXYSMAL NOCTURNAL HEMOGLOBINURIA) (HCC): Primary | ICD-10-CM

## 2021-12-02 LAB
ABO GROUP BLD: NORMAL
ANISOCYTOSIS BLD QL: ABNORMAL
ANTI-D: NORMAL
ANTI-E: NORMAL
BLD GP AB SCN SERPL QL: POSITIVE
DEPRECATED RDW RBC AUTO: 56.2 FL (ref 37–54)
ERYTHROCYTE [DISTWIDTH] IN BLOOD BY AUTOMATED COUNT: 19.4 % (ref 12.3–15.4)
HCT VFR BLD AUTO: 20.4 % (ref 34–46.6)
HGB BLD-MCNC: 6.7 G/DL (ref 12–15.9)
HOLD SPECIMEN: NORMAL
LYMPHOCYTES # BLD MANUAL: 1.82 10*3/MM3 (ref 0.7–3.1)
LYMPHOCYTES NFR BLD MANUAL: 6.2 % (ref 5–12)
MCH RBC QN AUTO: 29.1 PG (ref 26.6–33)
MCHC RBC AUTO-ENTMCNC: 32.8 G/DL (ref 31.5–35.7)
MCV RBC AUTO: 88.7 FL (ref 79–97)
MICROCYTES BLD QL: ABNORMAL
MONOCYTES # BLD: 0.17 10*3/MM3 (ref 0.1–0.9)
MYELOCYTES NFR BLD MANUAL: 1 % (ref 0–0)
NEUTROPHILS # BLD AUTO: 0.74 10*3/MM3 (ref 1.7–7)
NEUTROPHILS NFR BLD MANUAL: 25.8 % (ref 42.7–76)
NEUTS BAND NFR BLD MANUAL: 1 % (ref 0–5)
PLATELET # BLD AUTO: 3 10*3/MM3 (ref 140–450)
PMV BLD AUTO: ABNORMAL FL
POLYCHROMASIA BLD QL SMEAR: ABNORMAL
RBC # BLD AUTO: 2.3 10*6/MM3 (ref 3.77–5.28)
RH BLD: NEGATIVE
SMALL PLATELETS BLD QL SMEAR: ABNORMAL
T&S EXPIRATION DATE: NORMAL
VARIANT LYMPHS NFR BLD MANUAL: 1 % (ref 0–5)
VARIANT LYMPHS NFR BLD MANUAL: 64.9 % (ref 19.6–45.3)
WBC MORPH BLD: NORMAL
WBC NRBC COR # BLD: 2.76 10*3/MM3 (ref 3.4–10.8)

## 2021-12-02 PROCEDURE — 86850 RBC ANTIBODY SCREEN: CPT

## 2021-12-02 PROCEDURE — 96374 THER/PROPH/DIAG INJ IV PUSH: CPT

## 2021-12-02 PROCEDURE — 99214 OFFICE O/P EST MOD 30 MIN: CPT | Performed by: INTERNAL MEDICINE

## 2021-12-02 PROCEDURE — 96375 TX/PRO/DX INJ NEW DRUG ADDON: CPT

## 2021-12-02 PROCEDURE — 36430 TRANSFUSION BLD/BLD COMPNT: CPT

## 2021-12-02 PROCEDURE — 86902 BLOOD TYPE ANTIGEN DONOR EA: CPT

## 2021-12-02 PROCEDURE — 85007 BL SMEAR W/DIFF WBC COUNT: CPT

## 2021-12-02 PROCEDURE — 86870 RBC ANTIBODY IDENTIFICATION: CPT

## 2021-12-02 PROCEDURE — 86901 BLOOD TYPING SEROLOGIC RH(D): CPT

## 2021-12-02 PROCEDURE — P9016 RBC LEUKOCYTES REDUCED: HCPCS

## 2021-12-02 PROCEDURE — 86922 COMPATIBILITY TEST ANTIGLOB: CPT

## 2021-12-02 PROCEDURE — 86900 BLOOD TYPING SEROLOGIC ABO: CPT

## 2021-12-02 PROCEDURE — 25010000002 DIPHENHYDRAMINE PER 50 MG: Performed by: INTERNAL MEDICINE

## 2021-12-02 PROCEDURE — 36415 COLL VENOUS BLD VENIPUNCTURE: CPT

## 2021-12-02 PROCEDURE — 86920 COMPATIBILITY TEST SPIN: CPT

## 2021-12-02 PROCEDURE — 25010000002 HYDROCORTISONE SODIUM SUCCINATE 100 MG RECONSTITUTED SOLUTION: Performed by: INTERNAL MEDICINE

## 2021-12-02 PROCEDURE — 85025 COMPLETE CBC W/AUTO DIFF WBC: CPT

## 2021-12-02 RX ORDER — SODIUM CHLORIDE 9 MG/ML
250 INJECTION, SOLUTION INTRAVENOUS AS NEEDED
Status: CANCELLED | OUTPATIENT
Start: 2021-12-02

## 2021-12-02 RX ORDER — SODIUM CHLORIDE 9 MG/ML
250 INJECTION, SOLUTION INTRAVENOUS AS NEEDED
Status: DISCONTINUED | OUTPATIENT
Start: 2021-12-02 | End: 2021-12-02 | Stop reason: HOSPADM

## 2021-12-02 RX ORDER — FAMOTIDINE 10 MG/ML
20 INJECTION, SOLUTION INTRAVENOUS ONCE
Status: COMPLETED | OUTPATIENT
Start: 2021-12-02 | End: 2021-12-02

## 2021-12-02 RX ORDER — DIPHENHYDRAMINE HYDROCHLORIDE 50 MG/ML
25 INJECTION INTRAMUSCULAR; INTRAVENOUS ONCE
Status: CANCELLED | OUTPATIENT
Start: 2021-12-02 | End: 2021-12-02

## 2021-12-02 RX ORDER — ACETAMINOPHEN 325 MG/1
650 TABLET ORAL ONCE
Status: COMPLETED | OUTPATIENT
Start: 2021-12-02 | End: 2021-12-02

## 2021-12-02 RX ORDER — DIPHENHYDRAMINE HYDROCHLORIDE 50 MG/ML
25 INJECTION INTRAMUSCULAR; INTRAVENOUS ONCE
Status: COMPLETED | OUTPATIENT
Start: 2021-12-02 | End: 2021-12-02

## 2021-12-02 RX ORDER — ACETAMINOPHEN 325 MG/1
650 TABLET ORAL ONCE
Status: CANCELLED | OUTPATIENT
Start: 2021-12-02 | End: 2021-12-02

## 2021-12-02 RX ORDER — FAMOTIDINE 10 MG/ML
20 INJECTION, SOLUTION INTRAVENOUS ONCE
Status: CANCELLED | OUTPATIENT
Start: 2021-12-02 | End: 2021-12-02

## 2021-12-02 RX ADMIN — FAMOTIDINE 20 MG: 10 INJECTION INTRAVENOUS at 12:49

## 2021-12-02 RX ADMIN — DIPHENHYDRAMINE HYDROCHLORIDE 25 MG: 50 INJECTION INTRAMUSCULAR; INTRAVENOUS at 12:51

## 2021-12-02 RX ADMIN — SODIUM CHLORIDE 250 ML: 9 INJECTION, SOLUTION INTRAVENOUS at 12:20

## 2021-12-02 RX ADMIN — HYDROCORTISONE SODIUM SUCCINATE 100 MG: 100 INJECTION, POWDER, FOR SOLUTION INTRAMUSCULAR; INTRAVENOUS at 12:53

## 2021-12-02 RX ADMIN — ACETAMINOPHEN 325 MG: 325 TABLET, FILM COATED ORAL at 12:47

## 2021-12-02 NOTE — PATIENT INSTRUCTIONS
Blood Transfusion, Adult, Care After  This sheet gives you information about how to care for yourself after your procedure. Your doctor may also give you more specific instructions. If you have problems or questions, contact your doctor.  What can I expect after the procedure?  After the procedure, it is common to have:  · Bruising and soreness at the IV site.  · A fever or chills on the day of the procedure. This may be your body's response to the new blood cells received.  · A headache.  Follow these instructions at home:  Insertion site care         · Follow instructions from your doctor about how to take care of your insertion site. This is where an IV tube was put into your vein. Make sure you:  ? Wash your hands with soap and water before and after you change your bandage (dressing). If you cannot use soap and water, use hand .  ? Change your bandage as told by your doctor.  · Check your insertion site every day for signs of infection. Check for:  ? Redness, swelling, or pain.  ? Bleeding from the site.  ? Warmth.  ? Pus or a bad smell.  General instructions  · Take over-the-counter and prescription medicines only as told by your doctor.  · Rest as told by your doctor.  · Go back to your normal activities as told by your doctor.  · Keep all follow-up visits as told by your doctor. This is important.  Contact a doctor if:  · You have itching or red, swollen areas of skin (hives).  · You feel worried or nervous (anxious).  · You feel weak after doing your normal activities.  · You have redness, swelling, warmth, or pain around the insertion site.  · You have blood coming from the insertion site, and the blood does not stop with pressure.  · You have pus or a bad smell coming from the insertion site.  Get help right away if:  · You have signs of a serious reaction. This may be coming from an allergy or the body's defense system (immune system). Signs include:  ? Trouble breathing or shortness of  breath.  ? Swelling of the face or feeling warm (flushed).  ? Fever or chills.  ? Head, chest, or back pain.  ? Dark pee (urine) or blood in the pee.  ? Widespread rash.  ? Fast heartbeat.  ? Feeling dizzy or light-headed.  You may receive your blood transfusion in an outpatient setting. If so, you will be told whom to contact to report any reactions.  These symptoms may be an emergency. Do not wait to see if the symptoms will go away. Get medical help right away. Call your local emergency services (911 in the [place] Do not drive yourself to the hospital.  Summary  · Bruising and soreness at the IV site are common.  · Check your insertion site every day for signs of infection.  · Rest as told by your doctor. Go back to your normal activities as told by your doctor.  · Get help right away if you have signs of a serious reaction.  This information is not intended to replace advice given to you by your health care provider. Make sure you discuss any questions you have with your health care provider.  Document Revised: 06/11/2020 Document Reviewed: 06/11/2020  [employer] Patient Education © 2021 [employer]

## 2021-12-02 NOTE — PROGRESS NOTES
[facility] GROUP HEMATOLOGY & ONCOLOGY  [address]  [facility] [address]  [phone]       12/02/2021     PROGRESS NOTE     PATIENT NAME: Ratna Cohen  YOB: 1955  66 y.o. MR: [id number]    PCP: Faith Alcaraz APRN    HPI:   Ms. Ratna Cohen does not feel well today. She feels very fatigued. She is complaining of abdominal discomfort. 2 days ago she was complaining of increased burping which she thought was because of the recent medication, Promacta.    We reviewed the CBC results from today. Unfortunately the hemoglobin declined again to 6.7. We decided to proceed with PRBCs. The platelet count was stable at 3.  PAST HISTORY:     HEME/ONC HISTORY  Oncology/Hematology History Overview Note     HEME/ONC DX/RX/INVESTIGATIONS SUMMARY:   DX:   PNH (minimal neutropenia, severe anemia, severe thrombocytopenia).   Unremarkable BMBX on 10/4/2021. Anemic symptoms started in 2020, petechiae started in 6/2021.  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  Iron overload, transfusional.    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 Patient is A-.    10/21/2021, iron overload probably 2nd PRBCs.    10/23/2021, CT abd, undetermined 2.6 cm R adnexal lesion, needs further eval. when patient is more stable.    RX:   9/21/2021, 2U PRBCs and 2 platelet transfusions.  10/15/2021, 1U PRBC.  11/1/2021, 1U single donor PLT. 1U PRBC.  11/5/2021, 2U singe donor PLT.  11/12/2021, 1U PRBC.  11/17/2021 to 11/20/2021, Admitted to [facility], received IVIG (did not respond), 1 U PRBC, 3 PLT transfusions.  12/2/2021, 1U PRBC.    10/15/2021, prednisone: start 80 mg QD. 10/22/2021, DC prednisone.  10/22/2021, Meningococcus vaccines 1st dose (Bexsero and Menactra)  11/8/2021, Ultomiris, 1st dose (loading dose). 11/22/2021 Ultomiris 2nd dose (start rx Q8wks).   11/8/20201, Cipro 500 mg PO BID x 14  "days (prophylactic b/o Ultomiris).  11/24/2021, Nplate 1 mcg/Kg SubQ x one dose.  11/24/2021, folic acid 1 mg PO daily rxd (b/o chronic hemolysis).  11/24/2021, start Promacta 50 mg QD.    OTHER INFO:   No prior hematological history or significant medical history until 9/21/2021.    Stress test was positive during admission at [facility] in 9/21/2021, cardiac cath not possible because of thrombocytopenia.  Patient never had chest pain, only dyspnea and weakness.    INFO FROM [facility]:  \"Associated attestation - Felipe Maciel MD - 11/18/2021 12:10 PM CST Formatting of this note might be different from the original. Agree with resident interpretation of serologic testing.  Given stated evaluation of Naveen's syndrome evaluation, note that the patient's plasma did not react with their own RBCs, thus a negative auto-control. This is not consistent with immune mediated red blood cell destruction.\"     INVESTIGATIONS:   9/21/2021-9:48 AM, WBC 3.4 hemoglobin 7.7 .8 platelets 12 neutrophils 1.1 lymphocytes 1.8.  9/21/2021-17:20 PM, WBC 3.9 hemoglobin 8.3 .1 platelets 23.  9/22/2021, folic acid 25.9 ferritin 365 B12 368 HBsAg negative, HIV negative, hep C antibody negative reticulocyte 4.0%, PT 10.7, INR 1.0 PTT 25.3. Fecal occult blood negative.  9/22/2021, 1:37 AM, WBC 3.6 hemoglobin 7.0 .5 platelets 11 neutrophils 1.06 lymphocytes 2.19.  9/23/2021, WBC 4.0 hemoglobin 10.4 .1 platelets 12.  Neutrophils 1.38 lymphocytes 2.0. BUN 11 creatinine 0.47 calcium 8.5 bilirubin 0.7 ALT 31 AST 26 alk phos 68 albumin 3.7 total protein 7.1.  10/4/201, Diagnosis: BONE MARROW - PERIPHERAL BLOOD SMEAR, ASPIRATE SMEAR, PARTICLE PREPARATION, BIOPSY AND FLOW CYTOMETRY: NORMOCELLULAR MARROW WITH MATURING TRILINEAGE HEMATOPOIESIS, ERYTHROID ATYPIA, AND NO INCREASE IN BLASTS; SEE IMPRESSION.   10/4/2021, Myeloid NGS Panel RESULTS SUMMARY: (Final).Inconclusive: A variant of uncertain " significance was detected in this patient's sample.   10/4/2021, PERIPHERAL BLOOD SMEAR ([facility]):   CBC : WBC 4.6 k/microL, Hgb 9.5 g/dL, Plt-Ct 13 k/microL,  fL, RDW 20.8%.   A Pitt's stained peripheral smear is reviewed. Erythrocytes are decreased and are macrocytic and normochromic with anisopoikilocytosis. Polychromasia is present. Neutrophils are adequate in number, and demonstrate no significant morphologic abnormalities. Lymphocytes are adequate in number, and demonstrate no significant morphologic abnormalities. There are no circulating blasts, plasma cells, or abnormal lymphocytes. Platelets are decreased in number, and demonstrate no significant morphologic abnormalities.     10/13/2021, EPO 1,966.  Haptoglobin < 8.  .  Reticulocyte 4%.  10/15/2021, WBC 3.71 hemoglobin 7.6 .3 platelets 11.  10/18/2021, MARTA negative.    10/22/2021, PNH profile:  Comment: Peripheral Blood:   Glycosylphosphatidylinositol (GPI) anchor deficient cells detected (4.48% of granulocytes, 5.93% of monocytes, 0.07% of red blood cells have features of type II red blood cells, 0.50% of red blood cells have features of type III red blood cells).  10/23/2021, CT chest angio:  No evidence of pulmonary embolus. No acute findings.    10/23/2021, CT abd angio:  1.  No major arterial occlusion or flow-limiting stenosis. Nonaneurysmal  abdominal aorta with heavy atherosclerotic calcification.  2.  2.6 cm indeterminate RIGHT adnexal lesion. Recommend correlation  with pelvic ultrasound.  11/8/2021, type & screen, anti-E antibody  11/12/2021, type & screen, anti-E antibody and anti-D antibody  11/12/2021, [id number] Activity, >100.0 (>66.8) (Normal result).  11/19/2021, CT chest/abd at [facility], unremarkable.    12/2/221, see hemolytic markers at bottom of PN from 12/2/2021.       MEDICAL HISTORY   has a past medical history of Acid reflux, Anemia, and PNH (paroxysmal nocturnal hemoglobinuria) (HCC).   HEALTH  MAINTENANCE ITEMS  Health Maintenance Due   Topic Date Due   • COLORECTAL CANCER SCREENING  Never done   • ANNUAL PHYSICAL  Never done   • TDAP/TD VACCINES (1 - Tdap) Never done   • ZOSTER VACCINE (1 of 2) Never done   • INFLUENZA VACCINE  Never done   • HEPATITIS C SCREENING  Never done   • COVID-19 Vaccine (3 - Booster for Moderna series) 09/30/2021     <no information>  Last Completed Colonoscopy     This patient has no relevant Health Maintenance data.        Immunization History   Administered Date(s) Administered   • COVID-19 (MODERNA) 1st, 2nd, 3rd Dose Only 03/01/2021, 03/31/2021   • Hepatitis B Vaccine Adult IM 05/10/2007   • Meningococcal B,(Bexsero) 10/22/2021, 11/23/2021   • Meningococcal MCV4P (Menactra) 10/22/2021     Last Completed Mammogram          MAMMOGRAM (Every 2 Years) Next due on 9/1/2023 09/01/2021  Outside Procedure: HC MAMMOGRAM SCREENING BILAT DIGITAL W CAD              FAMILY HISTORY  Family History   Problem Relation Age of Onset   • Diabetes Mother    • Hypertension Mother    • Heart attack Mother    • Hypertension Father    • Heart attack Father        Cancer-related family history is not on file.   SURGICAL HISTORY   has a past surgical history that includes Appendectomy.  SOCIAL HISTORY  Social History     Socioeconomic History   • Marital status:    Tobacco Use   • Smoking status: Never Smoker   • Smokeless tobacco: Never Used   Vaping Use   • Vaping Use: Never used   Substance and Sexual Activity   • Alcohol use: Not Currently   • Drug use: Never   • Sexual activity: Yes     MEDICATIONS:     Current Outpatient Medications   Medication Instructions   • ciprofloxacin (CIPRO) 500 mg, Oral, 2 Times Daily   • folic acid (FOLVITE) 1 mg, Oral, Daily   • pantoprazole (PROTONIX) 20 mg, Oral, Daily   • potassium chloride (K-DUR,KLOR-CON) 20 MEQ CR tablet 20 mEq, Oral, 2 Times Daily   • Promacta 50 mg, Oral, Daily, Administer on an empty stomach, 1 hour before or 2 hours after a  "meal.      ALLERGIES:   No Known Allergies  ROS:   Pertinent positive and negative findings as noted in HPI.   PHYSICAL EXAM:   /78   Pulse 101   Temp 97.8 °F (36.6 °C)   Resp 16   Ht 154.9 cm (61\")   Wt 62.6 kg (138 lb)   SpO2 98%   BMI 26.07 kg/m²  Body surface area is 1.61 meters squared.   Pain Score    12/02/21 1105   PainSc: 0-No pain     Alert, oriented x3, cooperative, not in distress.  HEENT: Normocephalic, wearing a facemask.  Lungs: No tachypnea.   Extremities: No ankle edema.  Neurologic: Moves all extremities.    INVESTIGATIONS/LABS:     Lab Results - Last 18 Months   Lab Units 12/02/21  1027 11/29/21  0940 11/24/21  0935 11/22/21  1112 11/15/21  0910 11/12/21  0918 11/09/21  1041 11/09/21  1041 11/08/21  0915 11/05/21  0914 11/05/21  0914 11/01/21  0806 11/01/21  0806 10/27/21  1248 10/23/21  1012 10/21/21  0800 10/21/21  0800 10/18/21  1018 10/15/21  1015 10/13/21  1151 10/01/21  1116 09/27/21  0942 09/27/21  0942   WBC 10*3/mm3 2.76* 2.22* 3.09* 2.94* 3.48 3.45   < > 3.61 2.74*   < > 3.40   < > 4.36   < > 9.69   < > 8.71   < > 3.71  --   --    < > 5.24   HEMOGLOBIN g/dL 6.7* 7.3* 7.9* 7.7* 7.9* 6.9*   < > 7.3* 7.3*   < > 8.1*   < > 7.4*   < > 8.9*   < > 9.3*   < > 7.6*  --   --    < > 9.3*   HEMATOCRIT % 20.4* 21.6* 23.4* 22.8* 22.9* 20.7*   < > 21.3* 20.9*   < > 24.1*   < > 21.2*   < > 26.5*   < > 26.4*   < > 22.4*  --   --    < > 27.4*   MCV fL 88.7 87.4 87.6 88.4 94.2 96.3   < > 96.8 95.9   < > 95.6   < > 100.0*   < > 98.9*   < > 98.1*   < > 102.3*  --   --    < > 101.1*   PLATELETS 10*3/mm3 3* 2* 2* 2* 4* 8*   < > 8* 6*   < > 7*   < > 10*   < > 13*   < > 13*   < > 11*  --   --    < > 18*   IMM GRAN % %  --   --   --   --   --  0.3  --   --  0.4  --   --   --  0.2  --   --   --   --   --  0.3  --   --   --   --    NEUTROS ABS 10*3/mm3 0.74* 0.68* 1.18* 0.45* 1.29* 1.29*  --  1.19* 1.10*   < > 1.09*   < > 1.79   < > 4.67   < > 2.40   < > 1.33* 2.43   < >   < > 3.09   LYMPHS ABS " 10*3/mm3  --   --  1.59  --  1.84 1.77  --   --  1.32  --  1.81  --  2.14   < > 4.05*  --   --    < > 1.96 2.05   < >  --   --    MONOS ABS 10*3/mm3  --   --  0.30  --  0.32 0.34  --   --  0.27  --  0.44  --  0.36   < > 0.84  --   --    < > 0.37 0.21*   < >  --   --    EOS ABS 10*3/mm3  --  0.02 0.01  --  0.02 0.03  --  0.11 0.04   < > 0.06   < > 0.05   < > 0.06  --   --    < > 0.03 0.04   < >  --   --    EOSINOPHIL ABS %  --   --   --   --   --   --   --   --   --   --   --   --   --   --   --   --   --   --   --  0.9   < >  --   --    BASOS ABS 10*3/mm3  --  0.02 0.00  --  0.00 0.01  --   --  0.00  --  0.00   < > 0.01   < > 0.01  --   --    < > 0.01  --   --   --   --    IMMATURE GRANS (ABS) 10*3/mm3  --   --   --   --   --  0.01  --   --  0.01  --   --   --  0.01  --   --   --   --   --  0.01  --   --   --   --    NRBC /100 WBC  --   --   --   --   --  0.0  --   --  0.0  --   --   --  0.0  --   --   --   --   --  0.5*  --   --   --   --    NEUTROPHIL % % 25.8* 29.6*  --  15.2*  --   --   --  33.0*  --   --   --   --   --   --   --   --  24.5*  --   --   --   --   --  56.0   MONOCYTES % % 6.2 2.0*  --  10.1  --   --   --  9.0  --   --   --   --   --   --   --   --  4.1*  --   --  4.3   < >  --  7.0   BASOPHIL % %  --  1.0  --   --   --   --   --   --   --   --   --   --   --   --   --   --   --   --   --   --   --   --   --    ATYP LYMPH % % 1.0  --   --  7.1*  --   --   --   --   --   --   --   --   --   --   --   --   --   --   --   --   --   --   --    ANISOCYTOSIS  Slight/1+ Slight/1+  --  Slight/1+  --   --   --  Mod/2+  --   --   --   --   --   --  Slight/1+  --  Slight/1+  --   --  3+   < >   < > Slight/1+    < > = values in this interval not displayed.       Lab Results - Last 18 Months   Lab Units 11/29/21  0940 11/22/21  1112 11/15/21  0910 11/12/21  0918 11/09/21  1041 11/08/21  0915   BUN mg/dL 10 13 10 9 7* 8   CREATININE mg/dL 0.51* 0.57 0.50* 0.56* 0.46* 0.44*   GLUCOSE mg/dL 164* 119* 130* 128*  101* 142*   SODIUM mmol/L 135* 136 137 137 137 137   POTASSIUM mmol/L 3.6 3.6 3.7 3.5 3.8 3.6   CO2 mmol/L 24.0 25.0 24.0 23.0 26.0 25.0   CHLORIDE mmol/L 101 101 102 101 102 103   ANION GAP mmol/L 10.0 10.0 11.0 13.0 9.0 9.0   BUN / CREAT RATIO  19.6 22.8 20.0 16.1 15.2 18.2   CALCIUM mg/dL 9.4 9.0 8.9 8.9 9.2 9.2   EGFR IF NONAFRICN AM mL/min/1.73 121 107 124 109 137 144   ALK PHOS U/L 59 57 68 73 75 69   TOTAL PROTEIN g/dL 8.4 9.2* 7.0 7.3 7.5 7.2   ALT (SGPT) U/L 31 25 25 29 35* 37*   AST (SGOT) U/L 24 20 20 22 24 29   BILIRUBIN mg/dL 0.5 0.8 0.6 0.7 0.7 0.7   ALBUMIN g/dL 4.10 4.00 4.40 4.50 4.60 4.40   GLOBULIN gm/dL 4.3 5.2 2.6 2.8 2.9 2.8       Lab Results - Last 18 Months   Lab Units 11/29/21  0940 11/22/21  1112 11/17/21  0810 11/15/21  0910 11/12/21  0918 10/21/21  0800   LDH U/L 206 223* 222* 230* 247* 270*       Lab Results - Last 18 Months   Lab Units 11/29/21  0940 10/18/21  1018 10/13/21  1151 10/13/21  1008 10/01/21  1116 09/27/21  0942   IRON mcg/dL  --  230*  --   --  163 141   TIBC mcg/dL  --  282*  --   --  222* 280*   IRON SATURATION %  --  82*  --   --  73 50   FERRITIN ng/mL  --  872.00*  --   --  418* 472.40*   VITAMIN B 12 pg/mL 603 665  --  753  --  534   FOLATE ng/mL >20.00 17.10  --  16.6  --  >20.00   TSH mcunit/mL  --   --  1.291  --   --   --        CT Head Without Contrast    Result Date: 10/23/2021   1.  No acute intracranial finding. 2.  Mild paranasal sinus mucosal thickening. 3.  Chronic microvascular ischemic white matter change. This report was finalized on 10/23/2021 11:54 by Dr. Justo Chaves MD.    CT Angiogram Abdomen Pelvis    Result Date: 10/23/2021   1.  No major arterial occlusion or flow-limiting stenosis. Nonaneurysmal abdominal aorta with heavy atherosclerotic calcification. 2.  2.6 cm indeterminate RIGHT adnexal lesion. Recommend correlation with pelvic ultrasound. This report was finalized on 10/23/2021 13:17 by Dr. Justo Chaves MD.    CT Angiogram  Chest    Result Date: 10/23/2021   No evidence of pulmonary embolus. No acute findings. This report was finalized on 10/23/2021 13:12 by Dr. Justo Chaves MD.      ASSESSMENT:   PNH, recently treated with Ultomiris. Unfortunately the anemia has worsened today. Patient is symptomatic from the anemia.    ITP, remains with severe thrombocytopenia, platelet count of 3 today, Promacta started on 11/29/2021.    PLAN/RECOMMENDATIONS:   Transfuse one unit of PRBC today.    VST 12/6/2021, labs 1h PTV.    DX/ORDERS:   Diagnoses and all orders for this visit:    1. PNH (paroxysmal nocturnal hemoglobinuria) (HCC) (Primary)  -     CBC Auto Differential; Future  -     Lactate Dehydrogenase; Future  -     Haptoglobin; Future  -     Reticulocytes; Future  -     Type & Screen; Future  -     Direct Antiglobulin Test (Direct Gia); Future    2. Acute ITP (HCC)        Future Appointments   Date Time Provider Department Center   12/6/2021  9:45 AM [facility]   12/6/2021 10:15 AM Devante Galvez MD [facility]   12/21/2021  1:00 PM CHAIR 13 [facility] OP INFU ONC [facility]   1/17/2022 11:45 AM [facility]   1/17/2022 12:15 PM CHAIR 12 [facility]        Devante Galvez MD  12/2/2021    cc: Faith Alcaraz, DEVIN     xxxxxxxxxxxxxxxxxxxxxxxxxxxx    Hemolytic Markers    Component Reticulocyte % Reticulocyte Absolute   Latest Ref Rng & Units 0.70 - 1.90 % 0.0200 - 0.1300 10*6/mm3   10/1/2021 3.9 (H) 0.104 (H)   10/13/2021 4.0 (H) 0.095   10/21/2021 3.88 (H) 0.1036   10/27/2021 3.95 (H) 0.0960   11/15/2021 2.92 (H) 0.0710   11/17/2021 2.4 (H) 0.056   11/22/2021 2.28 (H) 0.0588   11/29/2021 1.99 (H) 0.0492     Component Haptoglobin   Latest Ref Rng & Units 30 - 200 mg/dL   10/13/2021 <8 (L)   10/21/2021 <10 (L)   10/27/2021 <10 (L)   11/12/2021 <10 (L)   11/15/2021 11 (L)   11/17/2021 27 (L)   11/22/2021 24 (L)   11/29/2021 64     Component LDH   Latest  Ref Rng & Units 135 - 214 U/L   9/27/2021 280 (H)   10/13/2021 244 (H)   10/21/2021 270 (H)   11/12/2021 247 (H)   11/15/2021 230 (H)   11/17/2021 222 (H)   11/22/2021 223 (H)   11/29/2021 206

## 2021-12-06 ENCOUNTER — LAB (OUTPATIENT)
Dept: LAB | Facility: HOSPITAL | Age: 66
End: 2021-12-06

## 2021-12-06 ENCOUNTER — TELEPHONE (OUTPATIENT)
Dept: ONCOLOGY | Facility: CLINIC | Age: 66
End: 2021-12-06

## 2021-12-06 ENCOUNTER — OFFICE VISIT (OUTPATIENT)
Dept: ONCOLOGY | Facility: CLINIC | Age: 66
End: 2021-12-06

## 2021-12-06 VITALS
BODY MASS INDEX: 26.19 KG/M2 | DIASTOLIC BLOOD PRESSURE: 70 MMHG | OXYGEN SATURATION: 97 % | WEIGHT: 138.7 LBS | RESPIRATION RATE: 16 BRPM | TEMPERATURE: 96.4 F | HEIGHT: 61 IN | HEART RATE: 114 BPM | SYSTOLIC BLOOD PRESSURE: 126 MMHG

## 2021-12-06 DIAGNOSIS — D69.3 IMMUNE THROMBOCYTOPENIA (HCC): ICD-10-CM

## 2021-12-06 DIAGNOSIS — D59.5 PNH (PAROXYSMAL NOCTURNAL HEMOGLOBINURIA) (HCC): ICD-10-CM

## 2021-12-06 DIAGNOSIS — D59.5 PNH (PAROXYSMAL NOCTURNAL HEMOGLOBINURIA) (HCC): Primary | ICD-10-CM

## 2021-12-06 LAB
ANISOCYTOSIS BLD QL: ABNORMAL
DAT IGG GEL: POSITIVE
DAT POLY-SP REAG RBC QL: POSITIVE
DEPRECATED RDW RBC AUTO: 53.9 FL (ref 37–54)
ERYTHROCYTE [DISTWIDTH] IN BLOOD BY AUTOMATED COUNT: 18.2 % (ref 12.3–15.4)
HAPTOGLOB SERPL-MCNC: 27 MG/DL (ref 30–200)
HCT VFR BLD AUTO: 23 % (ref 34–46.6)
HGB BLD-MCNC: 7.8 G/DL (ref 12–15.9)
LDH SERPL-CCNC: 223 U/L (ref 135–214)
LYMPHOCYTES # BLD MANUAL: 1.66 10*3/MM3 (ref 0.7–3.1)
LYMPHOCYTES NFR BLD MANUAL: 9 % (ref 5–12)
MCH RBC QN AUTO: 30 PG (ref 26.6–33)
MCHC RBC AUTO-ENTMCNC: 33.9 G/DL (ref 31.5–35.7)
MCV RBC AUTO: 88.5 FL (ref 79–97)
MONOCYTES # BLD: 0.22 10*3/MM3 (ref 0.1–0.9)
NEUTROPHILS # BLD AUTO: 0.53 10*3/MM3 (ref 1.7–7)
NEUTROPHILS NFR BLD MANUAL: 22 % (ref 42.7–76)
PLATELET # BLD AUTO: 3 10*3/MM3 (ref 140–450)
PMV BLD AUTO: ABNORMAL FL
POLYCHROMASIA BLD QL SMEAR: ABNORMAL
RBC # BLD AUTO: 2.6 10*6/MM3 (ref 3.77–5.28)
RETICS # AUTO: 0.05 10*6/MM3 (ref 0.02–0.13)
RETICS/RBC NFR AUTO: 2.07 % (ref 0.7–1.9)
SMALL PLATELETS BLD QL SMEAR: ABNORMAL
VARIANT LYMPHS NFR BLD MANUAL: 4 % (ref 0–5)
VARIANT LYMPHS NFR BLD MANUAL: 65 % (ref 19.6–45.3)
WBC MORPH BLD: NORMAL
WBC NRBC COR # BLD: 2.4 10*3/MM3 (ref 3.4–10.8)

## 2021-12-06 PROCEDURE — 83010 ASSAY OF HAPTOGLOBIN QUANT: CPT

## 2021-12-06 PROCEDURE — 36415 COLL VENOUS BLD VENIPUNCTURE: CPT

## 2021-12-06 PROCEDURE — 85045 AUTOMATED RETICULOCYTE COUNT: CPT

## 2021-12-06 PROCEDURE — 99213 OFFICE O/P EST LOW 20 MIN: CPT | Performed by: INTERNAL MEDICINE

## 2021-12-06 PROCEDURE — 86880 COOMBS TEST DIRECT: CPT

## 2021-12-06 PROCEDURE — 85025 COMPLETE CBC W/AUTO DIFF WBC: CPT

## 2021-12-06 PROCEDURE — 83615 LACTATE (LD) (LDH) ENZYME: CPT

## 2021-12-06 PROCEDURE — 85007 BL SMEAR W/DIFF WBC COUNT: CPT

## 2021-12-06 NOTE — TELEPHONE ENCOUNTER
CRITICAL LAB VALUE  Received call from [facility] Hemat[facility] lab with CRITICAL LAB VALUE:    PLT: 3    This information was given to Becki COTTON/Dr Galvez for review and address.

## 2021-12-06 NOTE — PROGRESS NOTES
MGW ONC [facility] GROUP HEMATOLOGY & ONCOLOGY  [address]  [place] [address]  [phone]       12/06/2021     PROGRESS NOTE     PATIENT NAME: Ratna Cohen  YOB: 1955  66 y.o. MR: [id number]    PCP: Faith Alcaraz APRN    CC: Follow-up of hemolytic anemia.    HPI:   Ms. Ratna Cohen is feeling much better today.  She received 1 unit of PRBCs last week.  She was very symptomatic from the anemia but fortunately responded well to the anemia.  Today we reviewed the labs.  We noted that the hemoglobin improved to 7.6 after PRBCs.  We discussed that the LDH is minimally elevated as well as the reticulocyte count.  She continues to tolerate Promacta well but unfortunately her platelet count remains low at 3.  We discussed that the MARTA test was positive today and prior to that it had been negative.  We discussed that because of the positive MARTA it is indeed possible that she may have AIHA.  She has an appointment scheduled at [facility] on 12/8/2021 and will be discussing those findings there.  PAST HISTORY:     HEME/ONC HISTORY  Oncology/Hematology History Overview Note     HEME/ONC DX/RX/INVESTIGATIONS SUMMARY:   DX:   PNH (minimal neutropenia, severe anemia, severe thrombocytopenia).   Unremarkable BMBX on 10/4/2021. Anemic symptoms started in 2020, petechiae started in 6/2021.  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  Iron overload, transfusional.    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 Patient is A-.    10/21/2021, iron overload probably 2nd PRBCs.    10/23/2021, CT abd, undetermined 2.6 cm R adnexal lesion, needs further eval. when patient is more stable.    RX:   9/21/2021, 2U PRBCs and 2 platelet transfusions.  10/15/2021, 1U PRBC.  11/1/2021, 1U single donor PLT. 1U PRBC.  11/5/2021, 2U singe donor PLT.  11/12/2021, 1U PRBC.  11/17/2021 to  "11/20/2021, Admitted to [facility], received IVIG (did not respond), 1 U PRBC, 3 PLT transfusions.  12/2/2021, 1U PRBC.    10/15/2021, prednisone: start 80 mg QD. 10/22/2021, DC prednisone.  10/22/2021, Meningococcus vaccines 1st dose (Bexsero and Menactra)  11/8/2021, Ultomiris, 1st dose (loading dose). 11/22/2021 Ultomiris 2nd dose (start rx Q8wks).   11/8/20201, Cipro 500 mg PO BID x 14 days (prophylactic b/o Ultomiris).  11/24/2021, Nplate 1 mcg/Kg SubQ x one dose.  11/24/2021, folic acid 1 mg PO daily rxd (b/o chronic hemolysis).  11/24/2021, start Promacta 50 mg QD.    OTHER INFO:   No prior hematological history or significant medical history until 9/21/2021.    Stress test was positive during admission at [facility] in 9/21/2021, cardiac cath not possible because of thrombocytopenia.  Patient never had chest pain, only dyspnea and weakness.    INFO FROM [facility]:  \"Associated attestation - Felipe Maciel MD - 11/18/2021 12:10 PM CST Formatting of this note might be different from the original. Agree with resident interpretation of serologic testing.  Given stated evaluation of Naveen's syndrome evaluation, note that the patient's plasma did not react with their own RBCs, thus a negative auto-control. This is not consistent with immune mediated red blood cell destruction.\"     INVESTIGATIONS:   9/21/2021-9:48 AM, WBC 3.4 hemoglobin 7.7 .8 platelets 12 neutrophils 1.1 lymphocytes 1.8.  9/21/2021-17:20 PM, WBC 3.9 hemoglobin 8.3 .1 platelets 23.  9/22/2021, folic acid 25.9 ferritin 365 B12 368 HBsAg negative, HIV negative, hep C antibody negative reticulocyte 4.0%, PT 10.7, INR 1.0 PTT 25.3. Fecal occult blood negative.  9/22/2021, 1:37 AM, WBC 3.6 hemoglobin 7.0 .5 platelets 11 neutrophils 1.06 lymphocytes 2.19.  9/23/2021, WBC 4.0 hemoglobin 10.4 .1 platelets 12.  Neutrophils 1.38 lymphocytes 2.0. BUN 11 creatinine 0.47 calcium 8.5 bilirubin 0.7 ALT 31 " AST 26 alk phos 68 albumin 3.7 total protein 7.1.  10/4/201, Diagnosis: BONE MARROW - PERIPHERAL BLOOD SMEAR, ASPIRATE SMEAR, PARTICLE PREPARATION, BIOPSY AND FLOW CYTOMETRY: NORMOCELLULAR MARROW WITH MATURING TRILINEAGE HEMATOPOIESIS, ERYTHROID ATYPIA, AND NO INCREASE IN BLASTS; SEE IMPRESSION.   10/4/2021, Myeloid NGS Panel RESULTS SUMMARY: (Final).Inconclusive: A variant of uncertain significance was detected in this patient's sample.   10/4/2021, PERIPHERAL BLOOD SMEAR ([place]):   CBC : WBC 4.6 k/microL, Hgb 9.5 g/dL, Plt-Ct 13 k/microL,  fL, RDW 20.8%.   A Pitt's stained peripheral smear is reviewed. Erythrocytes are decreased and are macrocytic and normochromic with anisopoikilocytosis. Polychromasia is present. Neutrophils are adequate in number, and demonstrate no significant morphologic abnormalities. Lymphocytes are adequate in number, and demonstrate no significant morphologic abnormalities. There are no circulating blasts, plasma cells, or abnormal lymphocytes. Platelets are decreased in number, and demonstrate no significant morphologic abnormalities.     10/13/2021, EPO 1,966.  Haptoglobin < 8.  .  Reticulocyte 4%.  10/15/2021, WBC 3.71 hemoglobin 7.6 .3 platelets 11.  10/18/2021, MARTA negative.    10/22/2021, PNH profile:  Comment: Peripheral Blood:   Glycosylphosphatidylinositol (GPI) anchor deficient cells detected (4.48% of granulocytes, 5.93% of monocytes, 0.07% of red blood cells have features of type II red blood cells, 0.50% of red blood cells have features of type III red blood cells).  10/23/2021, CT chest angio:  No evidence of pulmonary embolus. No acute findings.    10/23/2021, CT abd angio:  1.  No major arterial occlusion or flow-limiting stenosis. Nonaneurysmal  abdominal aorta with heavy atherosclerotic calcification.  2.  2.6 cm indeterminate RIGHT adnexal lesion. Recommend correlation  with pelvic ultrasound.  11/8/2021, type & screen, anti-E  antibody  11/12/2021, type & screen, anti-E antibody and anti-D antibody  11/12/2021, [id number] Activity, >100.0 (>66.8) (Normal result).  11/19/2021, CT chest/abd at [facility], unremarkable.  12/2/221, see hemolytic markers at bottom of PN from 12/2/2021.  12/6/2021, MARTA IgG positive       MEDICAL HISTORY   has a past medical history of Acid reflux, Anemia, and PNH (paroxysmal nocturnal hemoglobinuria) (HCC).   HEALTH MAINTENANCE ITEMS  Health Maintenance Due   Topic Date Due   • COLORECTAL CANCER SCREENING  Never done   • ANNUAL PHYSICAL  Never done   • TDAP/TD VACCINES (1 - Tdap) Never done   • ZOSTER VACCINE (1 of 2) Never done   • INFLUENZA VACCINE  Never done   • HEPATITIS C SCREENING  Never done   • COVID-19 Vaccine (3 - Booster for Moderna series) 09/30/2021     <no information>  Last Completed Colonoscopy     This patient has no relevant Health Maintenance data.        Immunization History   Administered Date(s) Administered   • COVID-19 (MODERNA) 1st, 2nd, 3rd Dose Only 03/01/2021, 03/31/2021   • Hepatitis B Vaccine Adult IM 05/10/2007   • Meningococcal B,(Bexsero) 10/22/2021, 11/23/2021   • Meningococcal MCV4P (Menactra) 10/22/2021     Last Completed Mammogram          MAMMOGRAM (Every 2 Years) Next due on 9/1/2023 09/01/2021  Outside Procedure: HC MAMMOGRAM SCREENING BILAT DIGITAL W CAD              FAMILY HISTORY  Family History   Problem Relation Age of Onset   • Diabetes Mother    • Hypertension Mother    • Heart attack Mother    • Hypertension Father    • Heart attack Father        Cancer-related family history is not on file.   SURGICAL HISTORY   has a past surgical history that includes Appendectomy.  SOCIAL HISTORY  Social History     Socioeconomic History   • Marital status:    Tobacco Use   • Smoking status: Never Smoker   • Smokeless tobacco: Never Used   Vaping Use   • Vaping Use: Never used   Substance and Sexual Activity   • Alcohol use: Not Currently   • Drug use: Never   •  "Sexual activity: Yes     MEDICATIONS:     Current Outpatient Medications   Medication Instructions   • ciprofloxacin (CIPRO) 500 mg, Oral, 2 Times Daily   • folic acid (FOLVITE) 1 mg, Oral, Daily   • pantoprazole (PROTONIX) 20 mg, Oral, Daily   • potassium chloride (K-DUR,KLOR-CON) 20 MEQ CR tablet 20 mEq, Oral, 2 Times Daily   • Promacta 50 mg, Oral, Daily, Administer on an empty stomach, 1 hour before or 2 hours after a meal.      ALLERGIES:   No Known Allergies  ROS:   Pertinent positive and negative findings as noted in HPI.   PHYSICAL EXAM:   /70   Pulse 114   Temp 96.4 °F (35.8 °C)   Resp 16   Ht 154.9 cm (61\")   Wt 62.9 kg (138 lb 11.2 oz)   SpO2 97%   BMI 26.21 kg/m²  Body surface area is 1.62 meters squared.   Pain Score    12/06/21 1022   PainSc: 0-No pain     Alert, oriented x3, cooperative, not in distress.  HEENT: Normocephalic, wearing a facemask.  Lungs: No tachypnea.   Extremities: No ankle edema.  Neurologic: Moves all extremities.    INVESTIGATIONS/LABS:     Lab Results - Last 18 Months   Lab Units 12/06/21  0951 12/02/21  1027 11/29/21  0940 11/24/21  0935 11/22/21  1112 11/15/21  0910 11/12/21  0918 11/12/21  0918 11/09/21  1041 11/09/21  1041 11/08/21  0915 11/05/21  0914 11/05/21  0914 11/01/21  0806 11/01/21  0806 10/27/21  1248 10/23/21  1012 10/21/21  0800 10/21/21  0800 10/18/21  1018 10/15/21  1015   WBC 10*3/mm3 2.40* 2.76* 2.22* 3.09* 2.94* 3.48   < > 3.45   < > 3.61 2.74*   < > 3.40   < > 4.36   < > 9.69   < > 8.71   < > 3.71   HEMOGLOBIN g/dL 7.8* 6.7* 7.3* 7.9* 7.7* 7.9*   < > 6.9*   < > 7.3* 7.3*   < > 8.1*   < > 7.4*   < > 8.9*   < > 9.3*   < > 7.6*   HEMATOCRIT % 23.0* 20.4* 21.6* 23.4* 22.8* 22.9*   < > 20.7*   < > 21.3* 20.9*   < > 24.1*   < > 21.2*   < > 26.5*   < > 26.4*   < > 22.4*   MCV fL 88.5 88.7 87.4 87.6 88.4 94.2   < > 96.3   < > 96.8 95.9   < > 95.6   < > 100.0*   < > 98.9*   < > 98.1*   < > 102.3*   PLATELETS 10*3/mm3 3* 3* 2* 2* 2* 4*   < > 8*   < > 8* " 6*   < > 7*   < > 10*   < > 13*   < > 13*   < > 11*   IMM GRAN % %  --   --   --   --   --   --   --  0.3  --   --  0.4  --   --   --  0.2  --   --   --   --   --  0.3   NEUTROS ABS 10*3/mm3 0.53* 0.74* 0.68* 1.18* 0.45* 1.29*   < > 1.29*  --  1.19* 1.10*   < > 1.09*   < > 1.79   < > 4.67   < > 2.40   < > 1.33*   LYMPHS ABS 10*3/mm3  --   --   --  1.59  --  1.84  --  1.77  --   --  1.32  --  1.81  --  2.14   < > 4.05*  --   --    < > 1.96   MONOS ABS 10*3/mm3  --   --   --  0.30  --  0.32  --  0.34  --   --  0.27  --  0.44  --  0.36   < > 0.84  --   --    < > 0.37   EOS ABS 10*3/mm3  --   --  0.02 0.01  --  0.02  --  0.03  --  0.11 0.04   < > 0.06   < > 0.05   < > 0.06  --   --    < > 0.03   BASOS ABS 10*3/mm3  --   --  0.02 0.00  --  0.00  --  0.01  --   --  0.00  --  0.00   < > 0.01   < > 0.01  --   --    < > 0.01   IMMATURE GRANS (ABS) 10*3/mm3  --   --   --   --   --   --   --  0.01  --   --  0.01  --   --   --  0.01  --   --   --   --   --  0.01   NRBC /100 WBC  --   --   --   --   --   --   --  0.0  --   --  0.0  --   --   --  0.0  --   --   --   --   --  0.5*   NEUTROPHIL % % 22.0* 25.8* 29.6*  --  15.2*  --   --   --   --  33.0*  --   --   --   --   --   --   --   --  24.5*  --   --    MONOCYTES % % 9.0 6.2 2.0*  --  10.1  --   --   --   --  9.0  --   --   --   --   --   --   --   --  4.1*  --   --    BASOPHIL % %  --   --  1.0  --   --   --   --   --   --   --   --   --   --   --   --   --   --   --   --   --   --    ATYP LYMPH % % 4.0 1.0  --   --  7.1*  --   --   --   --   --   --   --   --   --   --   --   --   --   --   --   --    ANISOCYTOSIS  Slight/1+ Slight/1+ Slight/1+  --  Slight/1+  --   --   --   --  Mod/2+  --   --   --   --   --   --  Slight/1+   < > Slight/1+  --   --     < > = values in this interval not displayed.       Lab Results - Last 18 Months   Lab Units 11/29/21  0940 11/22/21  1112 11/15/21  0910 11/12/21  0918 11/09/21  1041 11/08/21  0915   BUN mg/dL 10 13 10 9 7* 8   CREATININE  mg/dL 0.51* 0.57 0.50* 0.56* 0.46* 0.44*   GLUCOSE mg/dL 164* 119* 130* 128* 101* 142*   SODIUM mmol/L 135* 136 137 137 137 137   POTASSIUM mmol/L 3.6 3.6 3.7 3.5 3.8 3.6   CO2 mmol/L 24.0 25.0 24.0 23.0 26.0 25.0   CHLORIDE mmol/L 101 101 102 101 102 103   ANION GAP mmol/L 10.0 10.0 11.0 13.0 9.0 9.0   BUN / CREAT RATIO  19.6 22.8 20.0 16.1 15.2 18.2   CALCIUM mg/dL 9.4 9.0 8.9 8.9 9.2 9.2   EGFR IF NONAFRICN AM mL/min/1.73 121 107 124 109 137 144   ALK PHOS U/L 59 57 68 73 75 69   TOTAL PROTEIN g/dL 8.4 9.2* 7.0 7.3 7.5 7.2   ALT (SGPT) U/L 31 25 25 29 35* 37*   AST (SGOT) U/L 24 20 20 22 24 29   BILIRUBIN mg/dL 0.5 0.8 0.6 0.7 0.7 0.7   ALBUMIN g/dL 4.10 4.00 4.40 4.50 4.60 4.40   GLOBULIN gm/dL 4.3 5.2 2.6 2.8 2.9 2.8       Lab Results - Last 18 Months   Lab Units 12/06/21  0951 11/29/21  0940 11/22/21  1112 11/17/21  0810 11/15/21  0910 11/12/21  0918   LDH U/L 223* 206 223* 222* 230* 247*       Lab Results - Last 18 Months   Lab Units 11/29/21  0940 10/18/21  1018 10/13/21  1151 10/13/21  1008 10/01/21  1116 09/27/21  0942   IRON mcg/dL  --  230*  --   --  163 141   TIBC mcg/dL  --  282*  --   --  222* 280*   IRON SATURATION %  --  82*  --   --  73 50   FERRITIN ng/mL  --  872.00*  --   --  418* 472.40*   VITAMIN B 12 pg/mL 603 665  --  753  --  534   FOLATE ng/mL >20.00 17.10  --  16.6  --  >20.00   TSH mcunit/mL  --   --  1.291  --   --   --        CT Head Without Contrast    Result Date: 10/23/2021   1.  No acute intracranial finding. 2.  Mild paranasal sinus mucosal thickening. 3.  Chronic microvascular ischemic white matter change. This report was finalized on 10/23/2021 11:54 by Dr. Justo Chaves MD.    CT Angiogram Abdomen Pelvis    Result Date: 10/23/2021   1.  No major arterial occlusion or flow-limiting stenosis. Nonaneurysmal abdominal aorta with heavy atherosclerotic calcification. 2.  2.6 cm indeterminate RIGHT adnexal lesion. Recommend correlation with pelvic ultrasound. This report was  finalized on 10/23/2021 13:17 by Dr. Justo Chaves MD.    CT Angiogram Chest    Result Date: 10/23/2021   No evidence of pulmonary embolus. No acute findings. This report was finalized on 10/23/2021 13:12 by Dr. Justo Chaves MD.      ASSESSMENT:   Hemolytic anemia, previously diagnosed as PNH. Today the MARTA was positive, it was negative in 10/2021.  Differential diagnoses include AIHA.    ITP, so far not responding to Promacta.    PLAN/RECOMMENDATIONS:   Continue Promacta, 50 mg daily.    The patient has follow-up appointment at [facility] on 12/8/2021.  We will continue to follow their recommendations.    VST on 12/9/2021, labs 1h PTV.    DX/ORDERS:   Diagnoses and all orders for this visit:    1. PNH (paroxysmal nocturnal hemoglobinuria) (HCC) (Primary)  -     CBC Auto Differential; Future  -     Type & Screen; Future    2. Immune thrombocytopenia (HCC)        Future Appointments   Date Time Provider Department Center   12/9/2021  9:30 AM  [facility]  [facility]   12/9/2021 10:30 AM Devante Galvez [place] [facility]   12/13/2021 10:30 AM  [facility]  [facility]   12/13/2021 11:30 AM Devante Galvez [place] [facility]   12/21/2021  1:00 PM CHAIR 13  [facility] OP INFU ONC  [facility]   1/17/2022 11:45 AM  [facility]  [facility]   1/17/2022 12:15 PM CHAIR 12  [facility] OP INFU ONC  [facility] O[facility] PAD        Devante Galvez MD  12/6/2021    cc:  Faith Alcaraz APRN

## 2021-12-08 ENCOUNTER — SPECIALTY PHARMACY (OUTPATIENT)
Dept: ONCOLOGY | Facility: HOSPITAL | Age: 66
End: 2021-12-08

## 2021-12-08 ENCOUNTER — APPOINTMENT (OUTPATIENT)
Dept: ONCOLOGY | Facility: HOSPITAL | Age: 66
End: 2021-12-08

## 2021-12-08 NOTE — PROGRESS NOTES
[facility]    Oral Oncology Initial Consult      Consult Details  Patient Name (): Ratna Cohen  (1955)   Consulting Provider:   Devante Galvez MD   Medication and Regimen:  Promacta     Script Review and Evaluation:  Dosing & Interactions:   Reviewed, appropriate and no significant interaction noted at this time..   Oral Treatment Plan Signed?: Yes   Oral Treatment Plan Released?: Yes, released on [id number].   Specialty Pharmacy Selected:  [facility]   Prior Authorization Status: Approved   Scheduled Counseling Date: Discussed with patient in person and by phone 21   Predicted Prescription Fill Date: ASAP - patient will receive samples from office     Intervention(s):                     Education and information provided to Patient and  Jayce     Counseling Points  Administration Instructions:  · Dosing regimen.  · Medication should be taken on an empty stomach (1 hour before or 2 hours after a meal)  · Medication should also not be taken with food high in calcium (2 hours before or 4 hours after)  · Proper storage and handling.  · Dosing may be changed based on side effects and lab results.  Side Effects & Management  • Side effects are often individualized and are usually able to be managed with medication or non-pharmacologic strategies.  • Importance of managing side effects preemptively and alerting provider if side effects are unable to be managed at home.    • Highlighted common side effects and management strategies:  ? Muscle spasms or muscle aches - OTC medications unless instructed otherwise  ? Increased liver enzymes - Keep lab appointments, monitor for jaundice symptoms  ? Low blood counts  ? Nausea/Vomiting/Diarrhea possible.  ? Potential for developing and cataracts (though rare)  • Highly variable side effects dependent on indication, complete [facility] handout covered.  · Current medications acceptable from  an interaction standpoint, please alert of any changes as they can sometimes affect the medication.  Generally herbal products are not recommended.   · Barrier birth-control methods are recommended.     Contact Provider If:  · Any of the above side effects are not able to be managed at home, increase dramatically, or become intolerable.  · Signs and symptoms of infection (fever, chills, etc.), unusual bleeding or bruising, or confusion.  · Any unusual symptoms since starting this medication.       Questions/Comments:  None at this time     Consent:  Consent reviewed and signed.      Follow-up  Will follow-up with patient prior to next scheduled fill to monitor for side effects.        Asad Greco, PharmD  11/23/2021 07:24 CST

## 2021-12-09 ENCOUNTER — OFFICE VISIT (OUTPATIENT)
Dept: ONCOLOGY | Facility: CLINIC | Age: 66
End: 2021-12-09

## 2021-12-09 ENCOUNTER — APPOINTMENT (OUTPATIENT)
Dept: LAB | Facility: HOSPITAL | Age: 66
End: 2021-12-09

## 2021-12-09 VITALS
BODY MASS INDEX: 25.81 KG/M2 | WEIGHT: 136.7 LBS | TEMPERATURE: 96.8 F | HEIGHT: 61 IN | HEART RATE: 137 BPM | DIASTOLIC BLOOD PRESSURE: 68 MMHG | RESPIRATION RATE: 16 BRPM | SYSTOLIC BLOOD PRESSURE: 124 MMHG | OXYGEN SATURATION: 98 %

## 2021-12-09 DIAGNOSIS — D69.41 EVAN'S SYNDROME (HCC): ICD-10-CM

## 2021-12-09 DIAGNOSIS — D69.41 EVANS' SYNDROME (HCC): Primary | ICD-10-CM

## 2021-12-09 DIAGNOSIS — D59.10 AIHA (AUTOIMMUNE HEMOLYTIC ANEMIA) (HCC): ICD-10-CM

## 2021-12-09 DIAGNOSIS — D69.3 IMMUNE THROMBOCYTOPENIA (HCC): Primary | ICD-10-CM

## 2021-12-09 DIAGNOSIS — D69.3 IMMUNE THROMBOCYTOPENIA (HCC): ICD-10-CM

## 2021-12-09 PROCEDURE — 99214 OFFICE O/P EST MOD 30 MIN: CPT | Performed by: INTERNAL MEDICINE

## 2021-12-09 RX ORDER — LEVOFLOXACIN 500 MG/1
500 TABLET, FILM COATED ORAL DAILY
Qty: 30 TABLET | Refills: 1 | Status: SHIPPED | OUTPATIENT
Start: 2021-12-16 | End: 2021-12-23

## 2021-12-09 RX ORDER — SULFAMETHOXAZOLE AND TRIMETHOPRIM 800; 160 MG/1; MG/1
1 TABLET ORAL 3 TIMES WEEKLY
Qty: 15 TABLET | Refills: 1 | Status: SHIPPED | OUTPATIENT
Start: 2021-12-10 | End: 2021-12-23

## 2021-12-09 RX ORDER — DEXAMETHASONE 4 MG/1
40 TABLET ORAL DAILY
Qty: 40 TABLET | Refills: 0 | Status: SHIPPED | OUTPATIENT
Start: 2021-12-09 | End: 2021-12-13

## 2021-12-09 RX ORDER — VALACYCLOVIR HYDROCHLORIDE 1 G/1
1000 TABLET, FILM COATED ORAL DAILY
Qty: 30 TABLET | Refills: 3 | Status: SHIPPED | OUTPATIENT
Start: 2021-12-09

## 2021-12-09 NOTE — PROGRESS NOTES
[facility] ONC [facility] GROUP HEMATOLOGY & ONCOLOGY  [address]  [place] [address]  [phone]       12/09/2021     PROGRESS NOTE     PATIENT NAME: Ratna Cohen  YOB: 1955  66 y.o. MR: [id number]    PCP: Faith Alcaraz APRN    CC: Follow-up of hemolytic anemia, ITP.    HPI:   Ms. Ratna Cohen is feeling well today.  She is asymptomatic.  She came accompanied by her .  She went to [facility] yesterday and had follow-up with Dr. Tam.  Today he called.  We discussed the case. 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  We also discussed that here at [facility], on 12/6/2021, the MARTA was positive.    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    The patient informed me that Dr. Tam discussed yesterday with her and her  the treatments that he recommends at this time.  He recommended to start treatment with rituximab weekly x4 doses, dexamethasone 40 mg daily x4 days, prophylactic Septra, Levaquin and Valtrex.  He also recommended to increase Promacta to 100 mg daily.    I discussed the recommendations with the patient and the .  I explained that of course there are risks with this treatment because of immunesuppression and possible allergic reaction to medications and medication interactions but the patient would like to proceed with the treatments.    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  Prior dx of PNH (minimal neutropenia, severe anemia, severe  thrombocytopenia), changed to Naveen's syndrome on 12/9/2021, see PN from 12/9/2021 and PN from Dr. Blanchard, from [facility] from 12/8/2021.  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  Unremarkable BMBX on 10/4/2021. Anemic symptoms started in 2020, petechiae started in 6/2021.    Iron overload, transfusional.    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 Patient is A-.    10/21/2021, iron overload probably 2nd PRBCs.    10/23/2021, CT abd, undetermined 2.6 cm R adnexal lesion, needs further eval. when patient is more stable.    RX:   9/21/2021, 2U PRBCs and 2 platelet transfusions.  10/15/2021, 1U PRBC.  11/1/2021, 1U single donor PLT. 1U PRBC.  11/5/2021, 2U singe donor PLT.  11/12/2021, 1U PRBC.  11/17/2021 to 11/20/2021, Admitted to [facility], received IVIG (did not respond), 1 U PRBC, 3 PLT transfusions.  12/2/2021, 1U PRBC.  12/8/2021, platelet transfusion at [facility].    10/15/2021, prednisone: start 80 mg QD. 10/22/2021, DC prednisone.  10/22/2021, Meningococcus vaccines 1st dose (Bexsero and Menactra)  11/8/2021, Ultomiris, 1st dose (loading dose). 11/22/2021 Ultomiris 2nd dose (start rx Q8wks).   11/8/20201, Cipro 500 mg PO BID x 14 days (prophylactic b/o Ultomiris).  11/24/2021, Nplate 1 mcg/Kg SubQ x one dose.  11/24/2021, folic acid 1 mg PO daily rxd (b/o chronic hemolysis).  11/24/2021, start Promacta 50 mg QD. Changed to 100 mg QD on 12/8/2021.    12/14/2021, rituximab 375 mg/m2 weekly x 4 doses (anticipated).   12/14/2021, dexamethasone: 40 mg daily x 4 doses (anticipated).  12/14/2021, prophylactic meds: Septra DS, one tab PO M/W/F. Valtrex, 1000 mg daily. Levaquin 500 mg daily, start after dexamethasone (anticipated).    OTHER INFO:   No prior hematological history or significant medical history until 9/21/2021.    Stress test was positive during admission at [facility] in 9/21/2021, cardiac cath not  "possible because of thrombocytopenia.  Patient never had chest pain, only dyspnea and weakness.    INFO FROM [facility]:  \"Associated attestation - Felipe Maciel MD - 11/18/2021 12:10 PM CST Formatting of this note might be different from the original. Agree with resident interpretation of serologic testing.  Given stated evaluation of Naveen's syndrome evaluation, note that the patient's plasma did not react with their own RBCs, thus a negative auto-control. This is not consistent with immune mediated red blood cell destruction.\"     INVESTIGATIONS:   9/21/2021-9:48 AM, WBC 3.4 hemoglobin 7.7 .8 platelets 12 neutrophils 1.1 lymphocytes 1.8.  9/21/2021-17:20 PM, WBC 3.9 hemoglobin 8.3 .1 platelets 23.  9/22/2021, folic acid 25.9 ferritin 365 B12 368 HBsAg negative, HIV negative, hep C antibody negative reticulocyte 4.0%, PT 10.7, INR 1.0 PTT 25.3. Fecal occult blood negative.  9/22/2021, 1:37 AM, WBC 3.6 hemoglobin 7.0 .5 platelets 11 neutrophils 1.06 lymphocytes 2.19.  9/23/2021, WBC 4.0 hemoglobin 10.4 .1 platelets 12.  Neutrophils 1.38 lymphocytes 2.0. BUN 11 creatinine 0.47 calcium 8.5 bilirubin 0.7 ALT 31 AST 26 alk phos 68 albumin 3.7 total protein 7.1.  10/4/201, Diagnosis: BONE MARROW - PERIPHERAL BLOOD SMEAR, ASPIRATE SMEAR, PARTICLE PREPARATION, BIOPSY AND FLOW CYTOMETRY: NORMOCELLULAR MARROW WITH MATURING TRILINEAGE HEMATOPOIESIS, ERYTHROID ATYPIA, AND NO INCREASE IN BLASTS; SEE IMPRESSION.   10/4/2021, Myeloid NGS Panel RESULTS SUMMARY: (Final).Inconclusive: A variant of uncertain significance was detected in this patient's sample.   10/4/2021, PERIPHERAL BLOOD SMEAR ([place]):   CBC : WBC 4.6 k/microL, Hgb 9.5 g/dL, Plt-Ct 13 k/microL,  fL, RDW 20.8%.   A Pitt's stained peripheral smear is reviewed. Erythrocytes are decreased and are macrocytic and normochromic with anisopoikilocytosis. Polychromasia is present. Neutrophils are adequate in number, and " demonstrate no significant morphologic abnormalities. Lymphocytes are adequate in number, and demonstrate no significant morphologic abnormalities. There are no circulating blasts, plasma cells, or abnormal lymphocytes. Platelets are decreased in number, and demonstrate no significant morphologic abnormalities.     10/13/2021, EPO 1,966.  Haptoglobin < 8.  .  Reticulocyte 4%.  10/15/2021, WBC 3.71 hemoglobin 7.6 .3 platelets 11.  10/18/2021, MARTA negative.    10/22/2021, PNH profile:  Comment: Peripheral Blood:   Glycosylphosphatidylinositol (GPI) anchor deficient cells detected (4.48% of granulocytes, 5.93% of monocytes, 0.07% of red blood cells have features of type II red blood cells, 0.50% of red blood cells have features of type III red blood cells).  10/23/2021, CT chest angio:  No evidence of pulmonary embolus. No acute findings.    10/23/2021, CT abd angio:  1.  No major arterial occlusion or flow-limiting stenosis. Nonaneurysmal  abdominal aorta with heavy atherosclerotic calcification.  2.  2.6 cm indeterminate RIGHT adnexal lesion. Recommend correlation  with pelvic ultrasound.  11/8/2021, type & screen, anti-E antibody  11/12/2021, type & screen, anti-E antibody and anti-D antibody  11/12/2021, [id number] Activity, >100.0 (>66.8) (Normal result).  11/19/2021, CT chest/abd at [facility], unremarkable.  12/2/221, see hemolytic markers at bottom of PN from 12/2/2021.  12/6/2021, MARTA IgG positive ([facility]).  12/8/2021, MARTA negative ([facility]).       MEDICAL HISTORY   has a past medical history of Acid reflux, Anemia, and PNH (paroxysmal nocturnal hemoglobinuria) (HCC).   HEALTH MAINTENANCE ITEMS  Health Maintenance Due   Topic Date Due   • COLORECTAL CANCER SCREENING  Never done   • ANNUAL PHYSICAL  Never done   • TDAP/TD VACCINES (1 - Tdap) Never done   • ZOSTER VACCINE (1 of 2) Never done   • INFLUENZA VACCINE  Never done   • HEPATITIS C SCREENING  Never done   • COVID-19 Vaccine (3 - Booster for  Moderna series) 09/30/2021     <no information>  Last Completed Colonoscopy     This patient has no relevant Health Maintenance data.        Immunization History   Administered Date(s) Administered   • COVID-19 (MODERNA) 1st, 2nd, 3rd Dose Only 03/01/2021, 03/31/2021   • Hepatitis B Vaccine Adult IM 05/10/2007   • Meningococcal B,(Bexsero) 10/22/2021, 11/23/2021   • Meningococcal MCV4P (Menactra) 10/22/2021     Last Completed Mammogram          MAMMOGRAM (Every 2 Years) Next due on 9/1/2023 09/01/2021  Outside Procedure: HC MAMMOGRAM SCREENING BILAT DIGITAL W CAD              FAMILY HISTORY  Family History   Problem Relation Age of Onset   • Diabetes Mother    • Hypertension Mother    • Heart attack Mother    • Hypertension Father    • Heart attack Father        Cancer-related family history is not on file.   SURGICAL HISTORY   has a past surgical history that includes Appendectomy.  SOCIAL HISTORY  Social History     Socioeconomic History   • Marital status:    Tobacco Use   • Smoking status: Never Smoker   • Smokeless tobacco: Never Used   Vaping Use   • Vaping Use: Never used   Substance and Sexual Activity   • Alcohol use: Not Currently   • Drug use: Never   • Sexual activity: Yes     MEDICATIONS:     Current Outpatient Medications   Medication Instructions   • dexamethasone (DECADRON) 40 mg, Oral, Daily, Take 10 tabs (40 mg) daily for 4 days, start on 12/4/2021.   • eltrombopag (PROMACTA) 100 mg, Oral, Daily, Administer on an empty stomach, 1 hour before or 2 hours after a meal.   • folic acid (FOLVITE) 1 mg, Oral, Daily   • [START ON 12/16/2021] levoFLOXacin (LEVAQUIN) 500 mg, Oral, Daily, Take on tab daily, for prevention of bacterial infection. Start on 12/16/2021, day after last dose of dexamethasone.   • pantoprazole (PROTONIX) 20 mg, Oral, Daily   • potassium chloride (K-DUR,KLOR-CON) 20 MEQ CR tablet 20 mEq, Oral, 2 Times Daily   • [START ON 12/10/2021] sulfamethoxazole-trimethoprim (BACTRIM  "DS,SEPTRA DS) 800-160 MG per tablet 1 tablet, Oral, 3 Times Weekly, Take one tab every Monday, Wednesday and Friday (for prevention of bacterial infection).   • valACYclovir (VALTREX) 1,000 mg, Oral, Daily, Take one tab daily for prevention of shingles.      ALLERGIES:   No Known Allergies  ROS:   Pertinent positive and negative findings as noted in HPI.   PHYSICAL EXAM:   /68   Pulse (!) 137   Temp 96.8 °F (36 °C)   Resp 16   Ht 154.9 cm (61\")   Wt 62 kg (136 lb 11.2 oz)   SpO2 98%   Breastfeeding No   BMI 25.83 kg/m²  Body surface area is 1.61 meters squared.   Pain Score    12/09/21 1021   PainSc: 0-No pain     Alert, oriented x3, cooperative, not in distress.  HEENT: Normocephalic, wearing a facemask.  Lungs: No tachypnea.   Extremities: No ankle edema.  Neurologic: Moves all extremities.    INVESTIGATIONS/LABS:     Lab Results - Last 18 Months   Lab Units 12/08/21  0814 12/06/21  0951 12/02/21  1027 11/29/21  0940 11/24/21  0935 11/22/21  1112 11/15/21  0910 11/12/21  0918 11/12/21  0918 11/09/21  1041 11/09/21  1041 11/08/21  0915 11/05/21  0914 11/05/21  0914 11/01/21  0806 11/01/21  0806 10/23/21  1012 10/21/21  0800 10/21/21  0800 10/18/21  1018 10/15/21  1015   WBC 10*3/mm3  --  2.40* 2.76* 2.22* 3.09* 2.94* 3.48   < > 3.45   < > 3.61 2.74*   < > 3.40   < > 4.36   < >   < > 8.71   < > 3.71   HEMOGLOBIN g/dL  --  7.8* 6.7* 7.3* 7.9* 7.7* 7.9*   < > 6.9*   < > 7.3* 7.3*   < > 8.1*   < > 7.4*   < >   < > 9.3*   < > 7.6*   HEMATOCRIT %  --  23.0* 20.4* 21.6* 23.4* 22.8* 22.9*   < > 20.7*   < > 21.3* 20.9*   < > 24.1*   < > 21.2*   < >   < > 26.4*   < > 22.4*   MCV fL  --  88.5 88.7 87.4 87.6 88.4 94.2   < > 96.3   < > 96.8 95.9   < > 95.6   < > 100.0*   < >   < > 98.1*   < > 102.3*   PLATELETS 10*3/mm3  --  3* 3* 2* 2* 2* 4*   < > 8*   < > 8* 6*   < > 7*   < > 10*   < >   < > 13*   < > 11*   IMM GRAN % %  --   --   --   --   --   --   --   --  0.3  --   --  0.4  --   --   --  0.2  --   --   --   " --  0.3   NEUTROS ABS x10(3)/mcL 0.74* 0.53* 0.74* 0.68* 1.18* 0.45* 1.29*   < > 1.29*   < > 1.19* 1.10*   < > 1.09*   < > 1.79   < >   < > 2.40   < > 1.33*   LYMPHS ABS x10(3)/mcL 1.63  --   --   --  1.59  --  1.84  --  1.77  --   --  1.32  --  1.81   < > 2.14   < >  --   --    < > 1.96   MONOS ABS x10(3)/mcL 0.18*  --   --   --  0.30  --  0.32  --  0.34  --   --  0.27  --  0.44   < > 0.36   < >  --   --    < > 0.37   EOS ABS x10(3)/mcL 0.02*  --   --  0.02 0.01  --  0.02  --  0.03  --  0.11 0.04   < > 0.06   < > 0.05   < >   < >  --    < > 0.03   EOSINOPHIL ABS % 0.9  --   --   --   --   --   --   --   --   --   --   --   --   --   --   --   --   --   --   --   --    BASOS ABS 10*3/mm3  --   --   --  0.02 0.00  --  0.00  --  0.01  --   --  0.00  --  0.00   < > 0.01   < >  --   --    < > 0.01   IMMATURE GRANS (ABS) 10*3/mm3  --   --   --   --   --   --   --   --  0.01  --   --  0.01  --   --   --  0.01  --   --   --   --  0.01   NRBC /100 WBC  --   --   --   --   --   --   --   --  0.0  --   --  0.0  --   --   --  0.0  --   --   --   --  0.5*   NEUTROPHIL % %  --  22.0* 25.8* 29.6*  --  15.2*  --   --   --   --  33.0*  --   --   --   --   --   --   --  24.5*  --   --    MONOCYTES % % 7.0 9.0 6.2 2.0*  --  10.1  --   --   --   --  9.0  --   --   --   --   --   --    < > 4.1*  --   --    BASOPHIL % %  --   --   --  1.0  --   --   --   --   --   --   --   --   --   --   --   --   --   --   --   --   --    ATYP LYMPH % %  --  4.0 1.0  --   --  7.1*  --   --   --   --   --   --   --   --   --   --   --   --   --   --   --    ANISOCYTOSIS  2+ Slight/1+ Slight/1+ Slight/1+  --  Slight/1+  --   --   --   --  Mod/2+  --   --   --   --   --    < >   < > Slight/1+  --   --     < > = values in this interval not displayed.       Lab Results - Last 18 Months   Lab Units 11/29/21  0940 11/22/21  1112 11/15/21  0910 11/12/21  0918 11/09/21  1041 11/08/21  0915   BUN mg/dL 10 13 10 9 7* 8   CREATININE mg/dL 0.51* 0.57 0.50* 0.56*  0.46* 0.44*   GLUCOSE mg/dL 164* 119* 130* 128* 101* 142*   SODIUM mmol/L 135* 136 137 137 137 137   POTASSIUM mmol/L 3.6 3.6 3.7 3.5 3.8 3.6   CO2 mmol/L 24.0 25.0 24.0 23.0 26.0 25.0   CHLORIDE mmol/L 101 101 102 101 102 103   ANION GAP mmol/L 10.0 10.0 11.0 13.0 9.0 9.0   BUN / CREAT RATIO  19.6 22.8 20.0 16.1 15.2 18.2   CALCIUM mg/dL 9.4 9.0 8.9 8.9 9.2 9.2   EGFR IF NONAFRICN AM mL/min/1.73 121 107 124 109 137 144   ALK PHOS U/L 59 57 68 73 75 69   TOTAL PROTEIN g/dL 8.4 9.2* 7.0 7.3 7.5 7.2   ALT (SGPT) U/L 31 25 25 29 35* 37*   AST (SGOT) U/L 24 20 20 22 24 29   BILIRUBIN mg/dL 0.5 0.8 0.6 0.7 0.7 0.7   ALBUMIN g/dL 4.10 4.00 4.40 4.50 4.60 4.40   GLOBULIN gm/dL 4.3 5.2 2.6 2.8 2.9 2.8       Lab Results - Last 18 Months   Lab Units 12/08/21  0814 12/06/21  0951 11/29/21  0940 11/22/21  1112 11/17/21  0810 11/15/21  0910   LDH unit/L 219 223* 206 223* 222* 230*       Lab Results - Last 18 Months   Lab Units 11/29/21  0940 10/18/21  1018 10/13/21  1151 10/13/21  1008 10/01/21  1116 09/27/21  0942   IRON mcg/dL  --  230*  --   --  163 141   TIBC mcg/dL  --  282*  --   --  222* 280*   IRON SATURATION %  --  82*  --   --  73 50   FERRITIN ng/mL  --  872.00*  --   --  418* 472.40*   VITAMIN B 12 pg/mL 603 665  --  753  --  534   FOLATE ng/mL >20.00 17.10  --  16.6  --  >20.00   TSH mcunit/mL  --   --  1.291  --   --   --        CT Head Without Contrast    Result Date: 10/23/2021   1.  No acute intracranial finding. 2.  Mild paranasal sinus mucosal thickening. 3.  Chronic microvascular ischemic white matter change. This report was finalized on 10/23/2021 11:54 by Dr. Justo Chaves MD.    CT Angiogram Abdomen Pelvis    Result Date: 10/23/2021   1.  No major arterial occlusion or flow-limiting stenosis. Nonaneurysmal abdominal aorta with heavy atherosclerotic calcification. 2.  2.6 cm indeterminate RIGHT adnexal lesion. Recommend correlation with pelvic ultrasound. This report was finalized on 10/23/2021 13:17 by  Dr. Justo Chaves MD.    CT Angiogram Chest    Result Date: 10/23/2021   No evidence of pulmonary embolus. No acute findings. This report was finalized on 10/23/2021 13:12 by Dr. Justo Chaves MD.      ASSESSMENT:   Chronic refractory hemolytic anemia.  ITP.    Up to this point, we had the diagnosis regarding the hemolytic anemia as PNH because of a small PNH clone present on the flow cytometry and negative Gia test.  However, the patient remains with refractory ITP and has only had a modest response to Ultomiris.  Dr. 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  He recommended the treatment summarized in the HPI.  The patient requested to receive the treatments here in [place] since is much closer to her so I agreed to proceed with the treatment noted below.    PLAN/RECOMMENDATIONS:   Change Promacta to 100 mg daily, starting today.    Start rituximab 375 mg/m2 weekly x 4 doses, start on 12/13/2021.    VST 12/13/2021 prior to starting rituximab, labs 1h PTV.    Dexamethasone: 40 mg daily x 4 doses.  Prophylactic meds: Septra DS one tab PO M/W/F. Valtrex 1000 mg daily. Levaquin 500 mg daily, start after dexamethasone.    DX/ORDERS:   Diagnoses and all orders for this visit:    1. Palmer' syndrome (HCC) (Primary)  -     Cancel: CBC Auto Differential  -     Cancel: Direct Antiglobulin Test (Direct Gia)  -     CBC Auto Differential; Future  -     Direct Antiglobulin Test (Direct Gia); Future    2. Immune thrombocytopenia (HCC)  -     Cancel: CBC Auto Differential  -     Cancel: Direct Antiglobulin Test (Direct Gia)  -     CBC Auto Differential; Future  -     Direct Antiglobulin Test (Direct Gia); Future    Other orders  -     dexamethasone (DECADRON) 4 MG tablet; Take 10 tablets by mouth Daily for 4 days. Take 10 tabs (40 mg) daily for 4 days, start on 12/4/2021.  Dispense: 40 tablet; Refill: 0  -     levoFLOXacin (LEVAQUIN) 500 MG tablet; Take 1 tablet by  mouth Daily. Take on tab daily, for prevention of bacterial infection. Start on 12/16/2021, day after last dose of dexamethasone.  Dispense: 30 tablet; Refill: 1  -     valACYclovir (VALTREX) 1000 MG tablet; Take 1 tablet by mouth Daily. Take one tab daily for prevention of shingles.  Dispense: 30 tablet; Refill: 3  -     sulfamethoxazole-trimethoprim (BACTRIM DS,SEPTRA DS) 800-160 MG per tablet; Take 1 tablet by mouth 3 (Three) Times a Week. Take one tab every Monday, Wednesday and Friday (for prevention of bacterial infection).  Dispense: 15 tablet; Refill: 1        Future Appointments   Date Time Provider Department Center   12/13/2021 10:30 AM [facility] LAB [facility] PAD CCLAB PAD   12/13/2021 11:30 AM Devante Galvez MD [facility] [facility] PAD   12/14/2021  8:30 AM CHAIR 23 [facility] PAD OP INFU ONC [facility] PAD OIONC PAD   12/21/2021  7:30 AM [facility] LAB [facility] PAD CCLAB PAD   12/21/2021  8:00 AM CHAIR 02 [facility] PAD OP INFU ONC BH PAD OIONC PAD   12/21/2021  1:00 PM CHAIR 13 BH PAD OP INFU ONC BH PAD OIONC PAD   12/28/2021  8:30 AM [facility] LAB [facility] PAD CCLAB PAD   12/28/2021  9:00 AM CHAIR 07 BH PAD OP INFU ONC BH PAD OIONC PAD   1/4/2022  8:05 AM [facility] P[facility] CANCER CTR LAB BH PAD CCLAB PAD   1/4/2022  8:30 AM CHAIR 23 BH PAD OP INFU ONC BH PAD OIONC PAD   1/17/2022 11:45 AM [facility] CAN[facility] LAB BH PAD CCLAB PAD   1/17/2022 12:15 PM CHAIR 12 BH PAD OP INFU ONC BH PAD OIONC PAD        Devante Galvez MD  12/9/2021    cc:  Faith Alcaraz APRN

## 2021-12-13 ENCOUNTER — TELEPHONE (OUTPATIENT)
Dept: ONCOLOGY | Facility: CLINIC | Age: 66
End: 2021-12-13

## 2021-12-13 ENCOUNTER — APPOINTMENT (OUTPATIENT)
Dept: LAB | Facility: HOSPITAL | Age: 66
End: 2021-12-13

## 2021-12-13 NOTE — TELEPHONE ENCOUNTER
Caller: KIKO CHAN    Relationship: Emergency Contact    Best call back number: [phone]    What is the best time to reach you: ANYTIME    Who are you requesting to speak with (clinical staff, provider,  specific staff member): DR GOLDBERG OR NURSE    What was the call regarding: PTS  CALLED. THEY WOULD LIKE TO SPEAK WITH DR GOLDBERG BEFORE PTS APPT WITH DR HONEYCUTT TOMORROW IF POSSIBLE. PT WILL BE SWITCHING TO DR GOLDBERG FOR CARE AFTER THIS WEEK AND THEY WOULD LIKE HER OPINION ON THE TREATMENT PLAN THAT [place] AND DR HONEYCUTT HAVE PT STARTING TOMORROW.     Do you require a callback: YES, PLS CALL TO ADVISE.

## 2021-12-13 NOTE — TELEPHONE ENCOUNTER
Spoke to pt and pts . They understand that Dr. GARCIA will not have time to review pts chart Prior to Dr. Galvez seeing her tomorrow. They will get future appts and plans lined out tomorrow after they see Lenny. V/u

## 2021-12-14 ENCOUNTER — OFFICE VISIT (OUTPATIENT)
Dept: ONCOLOGY | Facility: CLINIC | Age: 66
End: 2021-12-14

## 2021-12-14 ENCOUNTER — LAB (OUTPATIENT)
Dept: LAB | Facility: HOSPITAL | Age: 66
End: 2021-12-14

## 2021-12-14 ENCOUNTER — INFUSION (OUTPATIENT)
Dept: ONCOLOGY | Facility: HOSPITAL | Age: 66
End: 2021-12-14

## 2021-12-14 VITALS
RESPIRATION RATE: 18 BRPM | BODY MASS INDEX: 26.06 KG/M2 | TEMPERATURE: 98.2 F | OXYGEN SATURATION: 96 % | SYSTOLIC BLOOD PRESSURE: 126 MMHG | WEIGHT: 138 LBS | HEART RATE: 88 BPM | DIASTOLIC BLOOD PRESSURE: 51 MMHG | HEIGHT: 61 IN

## 2021-12-14 VITALS
HEIGHT: 61 IN | OXYGEN SATURATION: 98 % | RESPIRATION RATE: 16 BRPM | BODY MASS INDEX: 26.09 KG/M2 | SYSTOLIC BLOOD PRESSURE: 122 MMHG | DIASTOLIC BLOOD PRESSURE: 66 MMHG | TEMPERATURE: 97.1 F | WEIGHT: 138.2 LBS | HEART RATE: 103 BPM

## 2021-12-14 DIAGNOSIS — D69.41 EVAN'S SYNDROME (HCC): ICD-10-CM

## 2021-12-14 DIAGNOSIS — D59.5 PNH (PAROXYSMAL NOCTURNAL HEMOGLOBINURIA) (HCC): ICD-10-CM

## 2021-12-14 DIAGNOSIS — D69.41 EVANS' SYNDROME (HCC): ICD-10-CM

## 2021-12-14 DIAGNOSIS — D59.10 AIHA (AUTOIMMUNE HEMOLYTIC ANEMIA) (HCC): ICD-10-CM

## 2021-12-14 DIAGNOSIS — D69.41 EVANS' SYNDROME (HCC): Primary | ICD-10-CM

## 2021-12-14 DIAGNOSIS — D69.3 IMMUNE THROMBOCYTOPENIA (HCC): ICD-10-CM

## 2021-12-14 LAB
ABO GROUP BLD: NORMAL
ANTI-D: NORMAL
ANTI-E: NORMAL
BLD GP AB SCN SERPL QL: POSITIVE
DAT POLY-SP REAG RBC QL: NEGATIVE
DEPRECATED RDW RBC AUTO: 51.8 FL (ref 37–54)
EOSINOPHIL # BLD MANUAL: 0.11 10*3/MM3 (ref 0–0.4)
EOSINOPHIL NFR BLD MANUAL: 4 % (ref 0.3–6.2)
ERYTHROCYTE [DISTWIDTH] IN BLOOD BY AUTOMATED COUNT: 18.4 % (ref 12.3–15.4)
HBV SURFACE AB SER RIA-ACNC: REACTIVE
HBV SURFACE AG SERPL QL IA: NORMAL
HCT VFR BLD AUTO: 18.7 % (ref 34–46.6)
HGB BLD-MCNC: 6.3 G/DL (ref 12–15.9)
LYMPHOCYTES # BLD MANUAL: 0.54 10*3/MM3 (ref 0.7–3.1)
LYMPHOCYTES NFR BLD MANUAL: 3 % (ref 5–12)
MCH RBC QN AUTO: 30 PG (ref 26.6–33)
MCHC RBC AUTO-ENTMCNC: 33.7 G/DL (ref 31.5–35.7)
MCV RBC AUTO: 89 FL (ref 79–97)
MONOCYTES # BLD: 0.08 10*3/MM3 (ref 0.1–0.9)
NEUTROPHILS # BLD AUTO: 1.99 10*3/MM3 (ref 1.7–7)
NEUTROPHILS NFR BLD MANUAL: 73 % (ref 42.7–76)
PLATELET # BLD AUTO: 3 10*3/MM3 (ref 140–450)
PMV BLD AUTO: ABNORMAL FL
RBC # BLD AUTO: 2.1 10*6/MM3 (ref 3.77–5.28)
RBC MORPH BLD: NORMAL
RH BLD: NEGATIVE
SMALL PLATELETS BLD QL SMEAR: ABNORMAL
T&S EXPIRATION DATE: NORMAL
VARIANT LYMPHS NFR BLD MANUAL: 13 % (ref 19.6–45.3)
VARIANT LYMPHS NFR BLD MANUAL: 7 % (ref 0–5)
WBC MORPH BLD: NORMAL
WBC NRBC COR # BLD: 2.72 10*3/MM3 (ref 3.4–10.8)

## 2021-12-14 PROCEDURE — 36415 COLL VENOUS BLD VENIPUNCTURE: CPT

## 2021-12-14 PROCEDURE — P9016 RBC LEUKOCYTES REDUCED: HCPCS

## 2021-12-14 PROCEDURE — 96375 TX/PRO/DX INJ NEW DRUG ADDON: CPT

## 2021-12-14 PROCEDURE — 25010000002 HYDROCORTISONE SODIUM SUCCINATE 100 MG RECONSTITUTED SOLUTION: Performed by: INTERNAL MEDICINE

## 2021-12-14 PROCEDURE — 86880 COOMBS TEST DIRECT: CPT

## 2021-12-14 PROCEDURE — 36430 TRANSFUSION BLD/BLD COMPNT: CPT

## 2021-12-14 PROCEDURE — 99214 OFFICE O/P EST MOD 30 MIN: CPT | Performed by: INTERNAL MEDICINE

## 2021-12-14 PROCEDURE — 86922 COMPATIBILITY TEST ANTIGLOB: CPT

## 2021-12-14 PROCEDURE — 86900 BLOOD TYPING SEROLOGIC ABO: CPT

## 2021-12-14 PROCEDURE — 87340 HEPATITIS B SURFACE AG IA: CPT

## 2021-12-14 PROCEDURE — 86870 RBC ANTIBODY IDENTIFICATION: CPT

## 2021-12-14 PROCEDURE — 86902 BLOOD TYPE ANTIGEN DONOR EA: CPT

## 2021-12-14 PROCEDURE — 86706 HEP B SURFACE ANTIBODY: CPT

## 2021-12-14 PROCEDURE — 86901 BLOOD TYPING SEROLOGIC RH(D): CPT

## 2021-12-14 PROCEDURE — 86850 RBC ANTIBODY SCREEN: CPT

## 2021-12-14 PROCEDURE — 96374 THER/PROPH/DIAG INJ IV PUSH: CPT

## 2021-12-14 PROCEDURE — 25010000002 DIPHENHYDRAMINE PER 50 MG: Performed by: INTERNAL MEDICINE

## 2021-12-14 PROCEDURE — 86704 HEP B CORE ANTIBODY TOTAL: CPT

## 2021-12-14 PROCEDURE — 86920 COMPATIBILITY TEST SPIN: CPT

## 2021-12-14 PROCEDURE — 85025 COMPLETE CBC W/AUTO DIFF WBC: CPT

## 2021-12-14 RX ORDER — ACETAMINOPHEN 325 MG/1
650 TABLET ORAL ONCE
Status: CANCELLED | OUTPATIENT
Start: 2021-12-14 | End: 2021-12-14

## 2021-12-14 RX ORDER — SODIUM CHLORIDE 9 MG/ML
250 INJECTION, SOLUTION INTRAVENOUS AS NEEDED
Status: CANCELLED | OUTPATIENT
Start: 2021-12-14

## 2021-12-14 RX ORDER — FAMOTIDINE 10 MG/ML
20 INJECTION, SOLUTION INTRAVENOUS ONCE
Status: COMPLETED | OUTPATIENT
Start: 2021-12-14 | End: 2021-12-14

## 2021-12-14 RX ORDER — DIPHENHYDRAMINE HYDROCHLORIDE 50 MG/ML
25 INJECTION INTRAMUSCULAR; INTRAVENOUS ONCE
Status: COMPLETED | OUTPATIENT
Start: 2021-12-14 | End: 2021-12-14

## 2021-12-14 RX ORDER — ACETAMINOPHEN 325 MG/1
650 TABLET ORAL ONCE
Status: COMPLETED | OUTPATIENT
Start: 2021-12-14 | End: 2021-12-14

## 2021-12-14 RX ORDER — SODIUM CHLORIDE 9 MG/ML
250 INJECTION, SOLUTION INTRAVENOUS AS NEEDED
Status: DISCONTINUED | OUTPATIENT
Start: 2021-12-14 | End: 2021-12-14 | Stop reason: HOSPADM

## 2021-12-14 RX ORDER — DIPHENHYDRAMINE HYDROCHLORIDE 50 MG/ML
25 INJECTION INTRAMUSCULAR; INTRAVENOUS ONCE
Status: CANCELLED | OUTPATIENT
Start: 2021-12-14 | End: 2021-12-14

## 2021-12-14 RX ORDER — FAMOTIDINE 10 MG/ML
20 INJECTION, SOLUTION INTRAVENOUS ONCE
Status: CANCELLED | OUTPATIENT
Start: 2021-12-14 | End: 2021-12-14

## 2021-12-14 RX ADMIN — FAMOTIDINE 20 MG: 10 INJECTION INTRAVENOUS at 13:27

## 2021-12-14 RX ADMIN — HYDROCORTISONE SODIUM SUCCINATE 100 MG: 100 INJECTION, POWDER, FOR SOLUTION INTRAMUSCULAR; INTRAVENOUS at 13:19

## 2021-12-14 RX ADMIN — DIPHENHYDRAMINE HYDROCHLORIDE 25 MG: 50 INJECTION INTRAMUSCULAR; INTRAVENOUS at 13:24

## 2021-12-14 RX ADMIN — ACETAMINOPHEN 650 MG: 325 TABLET ORAL at 13:18

## 2021-12-14 NOTE — PROGRESS NOTES
[facility] ONC [facility] GROUP HEMATOLOGY & ONCOLOGY  [address]  [place] [address]  [phone]       12/14/2021     PROGRESS NOTE     PATIENT NAME: Ratna Cohen  YOB: 1955  66 y.o. MR: [id number]    PCP: Faith Alcaraz APRN    CC: Here for follow-up of hemolytic anemia, ITP.     HPI:   Ms. Ratna Cohen feels fair today.  We discussed that unfortunately the anemia has worsened again today.  The hemoglobin is 6.3.  Despite that, she denies dyspnea, palpitations or chest pain.  She has minimal abdominal discomfort. Because of the severe anemia, we decided to proceed with transfusion of another unit of PRBC today.    Last week, we had a very lengthy discussion here and later by phone with the  about the management of the hemolytic anemia and ITP.  I explained to the patient that after discussing with Dr. Blanchard from [facility] last week, that I agreed with the treatments that he is recommending.  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 The patient and the  had had that discussion already with Dr. Blanchard last week and also with me last week but nonetheless we had a very lengthy discussion again today.    The patient and the  had ample questions today regarding the treatments and potential side effects of rituximab and dexamethasone.  I explained to them that there are indeed potential side effects including immune suppression and potential for developing infections associated with the immunosuppression. I explained again to the patient that at their request the patient was going to be treated here but if she is not comfortable receiving the treatments here she can be treated at [facility]. The patient and the  told me that they will call Dr. Blanchard before they agree to start the treatment here.    Since the patient has to be  transfused today, there will not be enough time to receive the blood and rituximab on the same day, therefore we are anticipating starting rituximab tomorrow or the day after depending on the availability at the infusion room.  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  Prior dx of PNH (minimal neutropenia, severe anemia, severe thrombocytopenia), changed to Naveen's syndrome on 12/9/2021, see PN from 12/9/2021 and PN from Dr. Blanchard, from [facility] from 12/8/2021.  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  Unremarkable BMBX on 10/4/2021. Anemic symptoms started in 2020, petechiae started in 6/2021.    Iron overload, transfusional.    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 Patient is A-.    10/21/2021, iron overload probably 2nd PRBCs.    10/23/2021, CT abd, undetermined 2.6 cm R adnexal lesion, needs further eval. when patient is more stable.    RX:   9/21/2021, 2U PRBCs and 2 platelet transfusions.  10/15/2021, 1U PRBC.  11/1/2021, 1U single donor PLT. 1U PRBC.  11/5/2021, 2U singe donor PLT.  11/12/2021, 1U PRBC.  11/17/2021 to 11/20/2021, Admitted to [facility], received IVIG (did not respond), 1 U PRBC, 3 PLT transfusions.  12/2/2021, 1U PRBC.  12/8/2021, platelet transfusion at [facility].  12/14/2021, 1U PRBC.    10/15/2021, prednisone: start 80 mg QD. 10/22/2021, DC prednisone.  10/22/2021, Meningococcus vaccines 1st dose (Bexsero and Menactra)  11/8/2021, Ultomiris, 1st dose (loading dose). 11/22/2021 Ultomiris 2nd dose (start rx Q8wks).   11/8/20201, Cipro 500 mg PO BID x 14 days (prophylactic b/o Ultomiris).  11/24/2021, Nplate 1 mcg/Kg SubQ x one dose.  11/24/2021, folic acid 1 mg PO daily rxd (b/o chronic hemolysis).  11/24/2021, start Promacta 50 mg QD. Changed to 100 mg  "QD on 12/8/2021.    12/16/2021, rituximab 375 mg/m2 weekly x 4 doses (anticipated).   12/16/2021, dexamethasone: 40 mg daily x 4 doses (anticipated).  12/16/2021, prophylactic meds: Septra DS, one tab PO M/W/F. Valtrex, 1000 mg daily. Levaquin 500 mg daily, start after dexamethasone (anticipated).    OTHER INFO:   No prior hematological history or significant medical history until 9/21/2021.    Stress test was positive during admission at [facility] in 9/21/2021, cardiac cath not possible because of thrombocytopenia.  Patient never had chest pain, only dyspnea and weakness.    INFO FROM [facility]:  \"Associated attestation - Felipe Maciel MD - 11/18/2021 12:10 PM CST Formatting of this note might be different from the original. Agree with resident interpretation of serologic testing.  Given stated evaluation of Naveen's syndrome evaluation, note that the patient's plasma did not react with their own RBCs, thus a negative auto-control. This is not consistent with immune mediated red blood cell destruction.\"     INVESTIGATIONS:   9/21/2021-9:48 AM, WBC 3.4 hemoglobin 7.7 .8 platelets 12 neutrophils 1.1 lymphocytes 1.8.  9/21/2021-17:20 PM, WBC 3.9 hemoglobin 8.3 .1 platelets 23.  9/22/2021, folic acid 25.9 ferritin 365 B12 368 HBsAg negative, HIV negative, hep C antibody negative reticulocyte 4.0%, PT 10.7, INR 1.0 PTT 25.3. Fecal occult blood negative.  9/22/2021, 1:37 AM, WBC 3.6 hemoglobin 7.0 .5 platelets 11 neutrophils 1.06 lymphocytes 2.19.  9/23/2021, WBC 4.0 hemoglobin 10.4 .1 platelets 12.  Neutrophils 1.38 lymphocytes 2.0. BUN 11 creatinine 0.47 calcium 8.5 bilirubin 0.7 ALT 31 AST 26 alk phos 68 albumin 3.7 total protein 7.1.  10/4/201, Diagnosis: BONE MARROW - PERIPHERAL BLOOD SMEAR, ASPIRATE SMEAR, PARTICLE PREPARATION, BIOPSY AND FLOW CYTOMETRY: NORMOCELLULAR MARROW WITH MATURING TRILINEAGE HEMATOPOIESIS, ERYTHROID ATYPIA, AND NO INCREASE IN BLASTS; " SEE IMPRESSION.   10/4/2021, Myeloid NGS Panel RESULTS SUMMARY: (Final).Inconclusive: A variant of uncertain significance was detected in this patient's sample.   10/4/2021, PERIPHERAL BLOOD SMEAR ([place]):   CBC : WBC 4.6 k/microL, Hgb 9.5 g/dL, Plt-Ct 13 k/microL,  fL, RDW 20.8%.   A Pitt's stained peripheral smear is reviewed. Erythrocytes are decreased and are macrocytic and normochromic with anisopoikilocytosis. Polychromasia is present. Neutrophils are adequate in number, and demonstrate no significant morphologic abnormalities. Lymphocytes are adequate in number, and demonstrate no significant morphologic abnormalities. There are no circulating blasts, plasma cells, or abnormal lymphocytes. Platelets are decreased in number, and demonstrate no significant morphologic abnormalities.     10/13/2021, EPO 1,966.  Haptoglobin < 8.  .  Reticulocyte 4%.  10/15/2021, WBC 3.71 hemoglobin 7.6 .3 platelets 11.  10/18/2021, MARTA negative.    10/22/2021, PNH profile:  Comment: Peripheral Blood:   Glycosylphosphatidylinositol (GPI) anchor deficient cells detected (4.48% of granulocytes, 5.93% of monocytes, 0.07% of red blood cells have features of type II red blood cells, 0.50% of red blood cells have features of type III red blood cells).  10/23/2021, CT chest angio:  No evidence of pulmonary embolus. No acute findings.    10/23/2021, CT abd angio:  1.  No major arterial occlusion or flow-limiting stenosis. Nonaneurysmal  abdominal aorta with heavy atherosclerotic calcification.  2.  2.6 cm indeterminate RIGHT adnexal lesion. Recommend correlation  with pelvic ultrasound.  11/8/2021, type & screen, anti-E antibody  11/12/2021, type & screen, anti-E antibody and anti-D antibody  11/12/2021, [id number] Activity, >100.0 (>66.8) (Normal result).  11/19/2021, CT chest/abd at [place], unremarkable.  12/2/221, see hemolytic markers at bottom of PN from 12/2/2021.  12/6/2021, MARTA IgG positive  ([facility]).  12/8/2021, MARTA negative ([place]).  12/14/2021, MARTA negative ([facility]).       MEDICAL HISTORY   has a past medical history of Acid reflux, Anemia, and PNH (paroxysmal nocturnal hemoglobinuria) (HCC).   HEALTH MAINTENANCE ITEMS  Health Maintenance Due   Topic Date Due   • COLORECTAL CANCER SCREENING  Never done   • ANNUAL PHYSICAL  Never done   • TDAP/TD VACCINES (1 - Tdap) Never done   • ZOSTER VACCINE (1 of 2) Never done   • HEPATITIS C SCREENING  Never done   • COVID-19 Vaccine (3 - Booster for Moderna series) 09/30/2021     <no information>  Last Completed Colonoscopy     This patient has no relevant Health Maintenance data.        Immunization History   Administered Date(s) Administered   • COVID-19 (MODERNA) 1st, 2nd, 3rd Dose Only 03/01/2021, 03/31/2021   • Hepatitis B Vaccine Adult IM 05/10/2007   • Meningococcal B,(Bexsero) 10/22/2021, 11/23/2021   • Meningococcal MCV4P (Menactra) 10/22/2021     Last Completed Mammogram          MAMMOGRAM (Every 2 Years) Next due on 9/1/2023 09/01/2021  Outside Procedure: HC MAMMOGRAM SCREENING BILAT DIGITAL W CAD              FAMILY HISTORY  Family History   Problem Relation Age of Onset   • Diabetes Mother    • Hypertension Mother    • Heart attack Mother    • Hypertension Father    • Heart attack Father        Cancer-related family history is not on file.   SURGICAL HISTORY   has a past surgical history that includes Appendectomy.  SOCIAL HISTORY  Social History     Socioeconomic History   • Marital status:    Tobacco Use   • Smoking status: Never Smoker   • Smokeless tobacco: Never Used   Vaping Use   • Vaping Use: Never used   Substance and Sexual Activity   • Alcohol use: Not Currently   • Drug use: Never   • Sexual activity: Yes     MEDICATIONS:     Current Outpatient Medications   Medication Instructions   • eltrombopag (PROMACTA) 100 mg, Oral, Daily, Administer on an empty stomach, 1 hour before or 2 hours after a meal.   • folic acid (FOLVITE) 1  "mg, Oral, Daily   • [START ON 12/16/2021] levoFLOXacin (LEVAQUIN) 500 mg, Oral, Daily, Take on tab daily, for prevention of bacterial infection. Start on 12/16/2021, day after last dose of dexamethasone.   • pantoprazole (PROTONIX) 20 mg, Oral, Daily   • potassium chloride (K-DUR,KLOR-CON) 20 MEQ CR tablet 20 mEq, Oral, 2 Times Daily   • sulfamethoxazole-trimethoprim (BACTRIM DS,SEPTRA DS) 800-160 MG per tablet 1 tablet, Oral, 3 Times Weekly, Take one tab every Monday, Wednesday and Friday (for prevention of bacterial infection).   • valACYclovir (VALTREX) 1,000 mg, Oral, Daily, Take one tab daily for prevention of shingles.      ALLERGIES:   No Known Allergies  ROS:   Pertinent positive and negative findings as noted in HPI.   PHYSICAL EXAM:   /66   Pulse 103   Temp 97.1 °F (36.2 °C)   Resp 16   Ht 154.9 cm (61\")   Wt 62.7 kg (138 lb 3.2 oz)   SpO2 98%   Breastfeeding No   BMI 26.11 kg/m²  Body surface area is 1.61 meters squared.   Pain Score    12/14/21 0823   PainSc:   2     Alert, oriented x3, cooperative, not in distress.  HEENT: Normocephalic, wearing a facemask.  Lungs: No tachypnea.   Extremities: No ankle edema.  Neurologic: Moves all extremities.    INVESTIGATIONS/LABS:     Lab Results - Last 18 Months   Lab Units 12/14/21  0811 12/08/21  0814 12/06/21  0951 12/02/21  1027 11/29/21  0940 11/24/21  0935 11/22/21  1112 11/15/21  0910 11/15/21  0910 11/12/21  0918 11/12/21  0918 11/09/21  1041 11/09/21  1041 11/08/21  0915 11/05/21  0914 11/05/21  0914 11/01/21  0806 11/01/21  0806 10/21/21  0800 10/18/21  1018 10/15/21  1015   WBC 10*3/mm3 2.72*  --  2.40* 2.76* 2.22* 3.09* 2.94*  --  3.48   < > 3.45   < > 3.61 2.74*   < > 3.40   < > 4.36   < >   < > 3.71   HEMOGLOBIN g/dL 6.3*  --  7.8* 6.7* 7.3* 7.9* 7.7*  --  7.9*   < > 6.9*   < > 7.3* 7.3*   < > 8.1*   < > 7.4*   < >   < > 7.6*   HEMATOCRIT % 18.7*  --  23.0* 20.4* 21.6* 23.4* 22.8*  --  22.9*   < > 20.7*   < > 21.3* 20.9*   < > 24.1*   < " > 21.2*   < >   < > 22.4*   MCV fL 89.0  --  88.5 88.7 87.4 87.6 88.4  --  94.2   < > 96.3   < > 96.8 95.9   < > 95.6   < > 100.0*   < >   < > 102.3*   PLATELETS 10*3/mm3 3*  --  3* 3* 2* 2* 2*  --  4*   < > 8*   < > 8* 6*   < > 7*   < > 10*   < >   < > 11*   IMM GRAN % %  --   --   --   --   --   --   --   --   --   --  0.3  --   --  0.4  --   --   --  0.2  --   --  0.3   NEUTROS ABS 10*3/mm3 1.99 0.74* 0.53* 0.74* 0.68* 1.18* 0.45*   < > 1.29*   < > 1.29*   < > 1.19* 1.10*   < > 1.09*   < > 1.79   < >   < > 1.33*   LYMPHS ABS 10*3/mm3 0.54*  --  1.66 1.82 1.45  --  2.20  --   --   --   --   --  1.99  --   --   --   --   --    < >   < >  --    MONOS ABS x10(3)/mcL  --  0.18*  --   --   --  0.30  --   --  0.32  --  0.34  --   --  0.27  --  0.44   < > 0.36  --    < > 0.37   EOS ABS 10*3/mm3 0.11 0.02*  --   --  0.02 0.01  --   --  0.02   < > 0.03  --  0.11 0.04   < > 0.06   < > 0.05   < >   < > 0.03   EOSINOPHIL ABS %  --  0.9  --   --   --   --   --   --   --   --   --   --   --   --   --   --   --   --   --   --   --    BASOS ABS 10*3/mm3  --   --   --   --  0.02 0.00  --   --  0.00  --  0.01  --   --  0.00  --  0.00   < > 0.01  --    < > 0.01   IMMATURE GRANS (ABS) 10*3/mm3  --   --   --   --   --   --   --   --   --   --  0.01  --   --  0.01  --   --   --  0.01  --   --  0.01   NRBC /100 WBC  --   --   --   --   --   --   --   --   --   --  0.0  --   --  0.0  --   --   --  0.0  --   --  0.5*   NEUTROPHIL % % 73.0  --  22.0* 25.8* 29.6*  --  15.2*  --   --   --   --   --  33.0*  --   --   --   --   --    < >   < >  --    MONOCYTES % % 3.0* 7.0 9.0 6.2 2.0*  --  10.1  --   --   --   --    < > 9.0  --   --   --   --   --    < >   < >  --    BASOPHIL % %  --   --   --   --  1.0  --   --   --   --   --   --   --   --   --   --   --   --   --   --   --   --    ATYP LYMPH % % 7.0*  --  4.0 1.0  --   --  7.1*  --   --   --   --   --   --   --   --   --   --   --   --   --   --    ANISOCYTOSIS   --  2+ Slight/1+ Slight/1+  Slight/1+  --  Slight/1+  --   --   --   --   --  Mod/2+  --   --   --   --   --    < >   < >  --     < > = values in this interval not displayed.       Lab Results - Last 18 Months   Lab Units 11/29/21  0940 11/22/21  1112 11/15/21  0910 11/12/21  0918 11/09/21  1041 11/08/21  0915   BUN mg/dL 10 13 10 9 7* 8   CREATININE mg/dL 0.51* 0.57 0.50* 0.56* 0.46* 0.44*   GLUCOSE mg/dL 164* 119* 130* 128* 101* 142*   SODIUM mmol/L 135* 136 137 137 137 137   POTASSIUM mmol/L 3.6 3.6 3.7 3.5 3.8 3.6   CO2 mmol/L 24.0 25.0 24.0 23.0 26.0 25.0   CHLORIDE mmol/L 101 101 102 101 102 103   ANION GAP mmol/L 10.0 10.0 11.0 13.0 9.0 9.0   BUN / CREAT RATIO  19.6 22.8 20.0 16.1 15.2 18.2   CALCIUM mg/dL 9.4 9.0 8.9 8.9 9.2 9.2   EGFR IF NONAFRICN AM mL/min/1.73 121 107 124 109 137 144   ALK PHOS U/L 59 57 68 73 75 69   TOTAL PROTEIN g/dL 8.4 9.2* 7.0 7.3 7.5 7.2   ALT (SGPT) U/L 31 25 25 29 35* 37*   AST (SGOT) U/L 24 20 20 22 24 29   BILIRUBIN mg/dL 0.5 0.8 0.6 0.7 0.7 0.7   ALBUMIN g/dL 4.10 4.00 4.40 4.50 4.60 4.40   GLOBULIN gm/dL 4.3 5.2 2.6 2.8 2.9 2.8       Lab Results - Last 18 Months   Lab Units 12/08/21  0814 12/06/21  0951 11/29/21  0940 11/22/21  1112 11/17/21  0810 11/15/21  0910   LDH unit/L 219 223* 206 223* 222* 230*       Lab Results - Last 18 Months   Lab Units 11/29/21  0940 10/18/21  1018 10/13/21  1151 10/13/21  1008 10/01/21  1116 09/27/21  0942   IRON mcg/dL  --  230*  --   --  163 141   TIBC mcg/dL  --  282*  --   --  222* 280*   IRON SATURATION %  --  82*  --   --  73 50   FERRITIN ng/mL  --  872.00*  --   --  418* 472.40*   VITAMIN B 12 pg/mL 603 665  --  753  --  534   FOLATE ng/mL >20.00 17.10  --  16.6  --  >20.00   TSH mcunit/mL  --   --  1.291  --   --   --        CT Head Without Contrast    Result Date: 10/23/2021   1.  No acute intracranial finding. 2.  Mild paranasal sinus mucosal thickening. 3.  Chronic microvascular ischemic white matter change. This report was finalized on 10/23/2021 11:54 by   Justo Chaves MD.    CT Angiogram Abdomen Pelvis    Result Date: 10/23/2021   1.  No major arterial occlusion or flow-limiting stenosis. Nonaneurysmal abdominal aorta with heavy atherosclerotic calcification. 2.  2.6 cm indeterminate RIGHT adnexal lesion. Recommend correlation with pelvic ultrasound. This report was finalized on 10/23/2021 13:17 by Dr. Justo Chaves MD.    CT Angiogram Chest    Result Date: 10/23/2021   No evidence of pulmonary embolus. No acute findings. This report was finalized on 10/23/2021 13:12 by Dr. Justo Chaves MD.      ASSESSMENT:   Refractory hemolytic anemia and ITP. The current working diagnosis is Naveen's syndrome.  See HPI and summary above.    Severe anemia today.  The hemoglobin was 6.3.  Platelets were 3 today.  Fortunately she is tolerating the anemia well and there is no evidence of bleeding.    PLAN/RECOMMENDATIONS:   Type and screen today.    Transfuse one unit of PRBC today.    Hold chemotherapy today, start chemotherapy tomorrow or ASAP.    VST same day before starting chemotherapy (tomorrow or later).    DX/ORDERS:   Diagnoses and all orders for this visit:    1. Palmer' syndrome (HCC) (Primary)  -     Type & Screen        Future Appointments   Date Time Provider Department Center   12/16/2021  8:00 AM Devante Galvez MD [facility]   12/16/2021  8:30 AM CHAIR 01 [facility] OP INFU ONC [facility] O[facility] P[facility]   12/21/2021  1:00 PM CHAIR 13 [facility]AD OP INFU ONC [facility] P[facility]   12/23/2021  8:00 AM [facility] CANCER CTR LAB [facility] CCLAB PAD   12/23/2021  8:15 AM CHAIR 21 [facility] PAD OP INFU ONC [facility] PAD   12/30/2021  8:15 AM [facility] CANCER CTR LAB [facility] PAD   12/30/2021  8:30 AM CHAIR 23 [facility] PAD OP INFU ONC [facility] PAD   1/6/2022  8:15 AM [facility] CANCER CTR LAB [facility] CCL[facility] PAD   1/6/2022  8:30 AM CHAIR 04 [facility] PAD OP INFU ONC [facility] O[facility] PAD   1/17/2022 11:45 AM  PAD CANCER CTR LAB [facility] P[facility] CCLAB PAD   1/17/2022 12:15 PM CHAIR 12 [facility] PAD OP INFU ONC  PAD OIONC PAD         Devante Galvez MD  12/14/2021    cc:  Faith Alcaraz APRN

## 2021-12-14 NOTE — PROGRESS NOTES
Subjective     PATIENT NAME:  Ratna Cohen  YOB: 1955  PATIENTS AGE:  66 y.o.  PATIENTS SEX:  female  DATE OF SERVICE:  12/14/2021  PROVIDER:  Devante Galvez MD      ____________________PATIENT EDUCATION____________________    PATIENT EDUCATION:  Today I met with the patient to discuss the immunotherapy regimen recommended for treatment of her disease.  The patient was given explanation of treatment premed side effects including office policy that prohibits patients to drive if sedating medications are administered, MD explanation given regarding benefits, side effects, toxicities and goals of treatment.  The patient received a Chemotherapy/Biotherapy Plan Summary including diagnosis and specific treatment plan.    SIDE EFFECTS:  Common side effects were discussed with the patient and her .  Discussion included anemia/fatigue, infection/chills/fever, appetite, diarrhea, loss of appetite,nausea/vomiting, rash, muscle aches/weakness, photosensitivity, high blood pressure, heart damage, liver damage, lung damage, kidney damage, DVT/PE risk, fluid retention.  The patient was advised that if complications occur, additional medical treatment is available.    Discussion also included side effects specific to drugs in the treatment plan, specifically Rituxan.      A total of 15 minutes were spent with the patient, with 100% of time spent in education and counseling.

## 2021-12-14 NOTE — PROGRESS NOTES
Called and S/W [facility] for chemo consent for Rituxan, the patient is in the office now.  They will obtain. JAZLYN Ramires    11:05:  Called blood bank to check about T & C status, they said it could be another hour, patient notified. JAZLYN Ramires    12:12:  Called blood bank to check about her T & C status, they said it could be another hour,  Pt notified. JAZLYN Ramires  12:50:  Called Blood bank to check about T & C, they stated it is ready and can start premeds now. JAZLYN Ramires

## 2021-12-15 LAB — HBV CORE AB SERPL QL IA: NEGATIVE

## 2021-12-16 ENCOUNTER — OFFICE VISIT (OUTPATIENT)
Dept: ONCOLOGY | Facility: CLINIC | Age: 66
End: 2021-12-16

## 2021-12-16 ENCOUNTER — INFUSION (OUTPATIENT)
Dept: ONCOLOGY | Facility: HOSPITAL | Age: 66
End: 2021-12-16

## 2021-12-16 VITALS
DIASTOLIC BLOOD PRESSURE: 66 MMHG | TEMPERATURE: 95.7 F | OXYGEN SATURATION: 98 % | BODY MASS INDEX: 26.13 KG/M2 | RESPIRATION RATE: 16 BRPM | HEIGHT: 61 IN | SYSTOLIC BLOOD PRESSURE: 122 MMHG | HEART RATE: 117 BPM | WEIGHT: 138.4 LBS

## 2021-12-16 VITALS
OXYGEN SATURATION: 97 % | RESPIRATION RATE: 18 BRPM | HEIGHT: 61 IN | BODY MASS INDEX: 26.06 KG/M2 | DIASTOLIC BLOOD PRESSURE: 47 MMHG | WEIGHT: 138 LBS | HEART RATE: 105 BPM | SYSTOLIC BLOOD PRESSURE: 142 MMHG | TEMPERATURE: 97 F

## 2021-12-16 DIAGNOSIS — D69.41 EVAN'S SYNDROME (HCC): Primary | ICD-10-CM

## 2021-12-16 DIAGNOSIS — D69.41 EVANS' SYNDROME (HCC): Primary | ICD-10-CM

## 2021-12-16 DIAGNOSIS — D69.41 EVAN'S SYNDROME (HCC): ICD-10-CM

## 2021-12-16 PROCEDURE — 96367 TX/PROPH/DG ADDL SEQ IV INF: CPT

## 2021-12-16 PROCEDURE — 99214 OFFICE O/P EST MOD 30 MIN: CPT | Performed by: INTERNAL MEDICINE

## 2021-12-16 PROCEDURE — 96415 CHEMO IV INFUSION ADDL HR: CPT

## 2021-12-16 PROCEDURE — 25010000002 RITUXIMAB 10 MG/ML SOLUTION 50 ML VIAL: Performed by: INTERNAL MEDICINE

## 2021-12-16 PROCEDURE — 96413 CHEMO IV INFUSION 1 HR: CPT

## 2021-12-16 PROCEDURE — 25010000002 DIPHENHYDRAMINE PER 50 MG: Performed by: INTERNAL MEDICINE

## 2021-12-16 PROCEDURE — 25010000002 RITUXIMAB 10 MG/ML SOLUTION 10 ML VIAL: Performed by: INTERNAL MEDICINE

## 2021-12-16 RX ORDER — FAMOTIDINE 10 MG/ML
20 INJECTION, SOLUTION INTRAVENOUS AS NEEDED
Status: DISCONTINUED | OUTPATIENT
Start: 2021-12-16 | End: 2021-12-16 | Stop reason: HOSPADM

## 2021-12-16 RX ORDER — SODIUM CHLORIDE 9 MG/ML
250 INJECTION, SOLUTION INTRAVENOUS ONCE
OUTPATIENT
Start: 2022-01-06

## 2021-12-16 RX ORDER — ACETAMINOPHEN 325 MG/1
650 TABLET ORAL ONCE
Status: CANCELLED | OUTPATIENT
Start: 2021-12-23

## 2021-12-16 RX ORDER — DIPHENHYDRAMINE HYDROCHLORIDE 50 MG/ML
50 INJECTION INTRAMUSCULAR; INTRAVENOUS AS NEEDED
Status: DISCONTINUED | OUTPATIENT
Start: 2021-12-16 | End: 2021-12-16 | Stop reason: HOSPADM

## 2021-12-16 RX ORDER — MEPERIDINE HYDROCHLORIDE 25 MG/ML
25 INJECTION INTRAMUSCULAR; INTRAVENOUS; SUBCUTANEOUS
Status: DISCONTINUED | OUTPATIENT
Start: 2021-12-16 | End: 2021-12-16 | Stop reason: HOSPADM

## 2021-12-16 RX ORDER — ACETAMINOPHEN 325 MG/1
650 TABLET ORAL ONCE
Status: CANCELLED | OUTPATIENT
Start: 2021-12-30

## 2021-12-16 RX ORDER — DIPHENHYDRAMINE HYDROCHLORIDE 50 MG/ML
50 INJECTION INTRAMUSCULAR; INTRAVENOUS AS NEEDED
Status: CANCELLED | OUTPATIENT
Start: 2021-12-30

## 2021-12-16 RX ORDER — SODIUM CHLORIDE 9 MG/ML
250 INJECTION, SOLUTION INTRAVENOUS ONCE
Status: CANCELLED | OUTPATIENT
Start: 2021-12-23

## 2021-12-16 RX ORDER — SODIUM CHLORIDE 9 MG/ML
250 INJECTION, SOLUTION INTRAVENOUS ONCE
Status: CANCELLED | OUTPATIENT
Start: 2021-12-16

## 2021-12-16 RX ORDER — MEPERIDINE HYDROCHLORIDE 25 MG/ML
25 INJECTION INTRAMUSCULAR; INTRAVENOUS; SUBCUTANEOUS
Status: CANCELLED | OUTPATIENT
Start: 2021-12-23

## 2021-12-16 RX ORDER — DIPHENHYDRAMINE HYDROCHLORIDE 50 MG/ML
50 INJECTION INTRAMUSCULAR; INTRAVENOUS AS NEEDED
OUTPATIENT
Start: 2022-01-06

## 2021-12-16 RX ORDER — ACETAMINOPHEN 325 MG/1
650 TABLET ORAL ONCE
Status: CANCELLED | OUTPATIENT
Start: 2021-12-16

## 2021-12-16 RX ORDER — SODIUM CHLORIDE 9 MG/ML
250 INJECTION, SOLUTION INTRAVENOUS ONCE
Status: COMPLETED | OUTPATIENT
Start: 2021-12-16 | End: 2021-12-16

## 2021-12-16 RX ORDER — SODIUM CHLORIDE 9 MG/ML
250 INJECTION, SOLUTION INTRAVENOUS ONCE
Status: CANCELLED | OUTPATIENT
Start: 2021-12-30

## 2021-12-16 RX ORDER — FAMOTIDINE 10 MG/ML
20 INJECTION, SOLUTION INTRAVENOUS AS NEEDED
Status: CANCELLED | OUTPATIENT
Start: 2021-12-23

## 2021-12-16 RX ORDER — DIPHENHYDRAMINE HYDROCHLORIDE 50 MG/ML
50 INJECTION INTRAMUSCULAR; INTRAVENOUS AS NEEDED
Status: CANCELLED | OUTPATIENT
Start: 2021-12-16

## 2021-12-16 RX ORDER — POTASSIUM CHLORIDE 20 MEQ/1
20 TABLET, EXTENDED RELEASE ORAL 2 TIMES DAILY
Qty: 180 TABLET | Refills: 0 | Status: SHIPPED | OUTPATIENT
Start: 2021-12-16 | End: 2021-12-23 | Stop reason: ALTCHOICE

## 2021-12-16 RX ORDER — DIPHENHYDRAMINE HYDROCHLORIDE 50 MG/ML
50 INJECTION INTRAMUSCULAR; INTRAVENOUS AS NEEDED
Status: CANCELLED | OUTPATIENT
Start: 2021-12-23

## 2021-12-16 RX ORDER — FAMOTIDINE 10 MG/ML
20 INJECTION, SOLUTION INTRAVENOUS AS NEEDED
OUTPATIENT
Start: 2022-01-06

## 2021-12-16 RX ORDER — MEPERIDINE HYDROCHLORIDE 25 MG/ML
25 INJECTION INTRAMUSCULAR; INTRAVENOUS; SUBCUTANEOUS
Status: CANCELLED | OUTPATIENT
Start: 2021-12-16

## 2021-12-16 RX ORDER — MEPERIDINE HYDROCHLORIDE 25 MG/ML
25 INJECTION INTRAMUSCULAR; INTRAVENOUS; SUBCUTANEOUS
OUTPATIENT
Start: 2022-01-06

## 2021-12-16 RX ORDER — MEPERIDINE HYDROCHLORIDE 25 MG/ML
25 INJECTION INTRAMUSCULAR; INTRAVENOUS; SUBCUTANEOUS
Status: CANCELLED | OUTPATIENT
Start: 2021-12-30

## 2021-12-16 RX ORDER — FAMOTIDINE 10 MG/ML
20 INJECTION, SOLUTION INTRAVENOUS AS NEEDED
Status: CANCELLED | OUTPATIENT
Start: 2021-12-30

## 2021-12-16 RX ORDER — FAMOTIDINE 10 MG/ML
20 INJECTION, SOLUTION INTRAVENOUS AS NEEDED
Status: CANCELLED | OUTPATIENT
Start: 2021-12-16

## 2021-12-16 RX ORDER — ACETAMINOPHEN 325 MG/1
650 TABLET ORAL ONCE
Status: COMPLETED | OUTPATIENT
Start: 2021-12-16 | End: 2021-12-16

## 2021-12-16 RX ORDER — ACETAMINOPHEN 325 MG/1
650 TABLET ORAL ONCE
OUTPATIENT
Start: 2022-01-06

## 2021-12-16 RX ADMIN — SODIUM CHLORIDE 250 ML: 9 INJECTION, SOLUTION INTRAVENOUS at 09:05

## 2021-12-16 RX ADMIN — DIPHENHYDRAMINE HYDROCHLORIDE 50 MG: 50 INJECTION, SOLUTION INTRAMUSCULAR; INTRAVENOUS at 09:32

## 2021-12-16 RX ADMIN — ACETAMINOPHEN 650 MG: 325 TABLET ORAL at 09:32

## 2021-12-16 RX ADMIN — RITUXIMAB 600 MG: 10 INJECTION, SOLUTION INTRAVENOUS at 09:52

## 2021-12-16 NOTE — PROGRESS NOTES
[facility] HEMATOLOGY & ONCOLOGY  [address]  [place] [address]  [phone]       12/16/2021     PROGRESS NOTE     PATIENT NAME: Ratna Cohen  YOB: 1955  66 y.o. 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 Ratna Cohen feels well today.  She is entirely asymptomatic.  She had a discussion with Dr. Blanchard from [facility] over the phone 2 days ago.  She decided to proceed with the treatment that he recommended, rituximab, dexamethasone and prophylactic Levaquin, Septra and Valtrex.  She received PRBCs 2 days ago.  There was no availability in the infusion room yesterday therefore the rituximab will be started today.  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  Prior dx of PNH (minimal neutropenia, severe anemia, severe thrombocytopenia), changed to Naveen's syndrome on 12/9/2021, see PN from 12/9/2021 and PN from Dr. Blanchard, from [facility] from 12/8/2021.  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  Unremarkable BMBX on 10/4/2021. Anemic symptoms started in 2020, petechiae started in 6/2021.    Iron overload, transfusional.    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 Patient is A-.    10/21/2021, iron overload probably 2nd PRBCs.    10/23/2021, CT abd, undetermined 2.6 cm R adnexal lesion, needs further eval. when patient is more stable.    RX:   9/21/2021, 2U PRBCs and 2 platelet transfusions.  10/15/2021, 1U PRBC.  11/1/2021, 1U single donor PLT. 1U PRBC.  11/5/2021, 2U singe donor PLT.  11/12/2021, 1U PRBC.  11/17/2021 to 11/20/2021, Admitted to [facility], received IVIG (did not respond), 1 U PRBC, 3 PLT  "transfusions.  12/2/2021, 1U PRBC.  12/8/2021, platelet transfusion at [place].  12/14/2021, 1U PRBC.    10/15/2021, prednisone: start 80 mg QD. 10/22/2021, DC prednisone.  10/22/2021, Meningococcus vaccines 1st dose (Bexsero and Menactra)  11/8/2021, Ultomiris, 1st dose (loading dose). 11/22/2021 Ultomiris 2nd dose (start rx Q8wks).   11/8/20201, Cipro 500 mg PO BID x 14 days (prophylactic b/o Ultomiris).  11/24/2021, Nplate 1 mcg/Kg SubQ x one dose.  11/24/2021, folic acid 1 mg PO daily rxd (b/o chronic hemolysis).  11/24/2021, start Promacta 50 mg QD. Changed to 100 mg QD on 12/8/2021.    12/16/2021, rituximab 375 mg/m2 weekly x 4 doses.   12/16/2021, dexamethasone: 40 mg daily x 4 doses.  12/16/2021, prophylactic meds: Septra DS, one tab PO M/W/F. Valtrex, 1000 mg daily. Levaquin 500 mg daily, start after dexamethasone.    OTHER INFO:   No prior hematological history or significant medical history until 9/21/2021.      INFO FROM [facility]:  \"Associated attestation - Felipe Maciel MD - 11/18/2021 12:10 PM CST Formatting of this note might be different from the original. Agree with resident interpretation of serologic testing.  Given stated evaluation of Naveen's syndrome evaluation, note that the patient's plasma did not react with their own RBCs, thus a negative auto-control. This is not consistent with immune mediated red blood cell destruction.\"     INVESTIGATIONS:   9/21/2021-9:48 AM, WBC 3.4 hemoglobin 7.7 .8 platelets 12 neutrophils 1.1 lymphocytes 1.8.  9/21/2021-17:20 PM, WBC 3.9 hemoglobin 8.3 .1 platelets 23.  9/22/2021, folic acid 25.9 ferritin 365 B12 368 HBsAg negative, HIV negative, hep C antibody negative reticulocyte 4.0%, PT 10.7, INR 1.0 PTT 25.3. Fecal occult blood negative.  9/22/2021, 1:37 AM, WBC 3.6 hemoglobin 7.0 .5 platelets 11 neutrophils 1.06 lymphocytes 2.19.  9/23/2021, WBC 4.0 hemoglobin 10.4 .1 platelets 12.  Neutrophils 1.38 lymphocytes 2.0. " BUN 11 creatinine 0.47 calcium 8.5 bilirubin 0.7 ALT 31 AST 26 alk phos 68 albumin 3.7 total protein 7.1.  10/4/201, Diagnosis: BONE MARROW - PERIPHERAL BLOOD SMEAR, ASPIRATE SMEAR, PARTICLE PREPARATION, BIOPSY AND FLOW CYTOMETRY: NORMOCELLULAR MARROW WITH MATURING TRILINEAGE HEMATOPOIESIS, ERYTHROID ATYPIA, AND NO INCREASE IN BLASTS; SEE IMPRESSION.   10/4/2021, Myeloid NGS Panel RESULTS SUMMARY: (Final).Inconclusive: A variant of uncertain significance was detected in this patient's sample.   10/4/2021, PERIPHERAL BLOOD SMEAR ([place]):   CBC : WBC 4.6 k/microL, Hgb 9.5 g/dL, Plt-Ct 13 k/microL,  fL, RDW 20.8%.   A Pitt's stained peripheral smear is reviewed. Erythrocytes are decreased and are macrocytic and normochromic with anisopoikilocytosis. Polychromasia is present. Neutrophils are adequate in number, and demonstrate no significant morphologic abnormalities. Lymphocytes are adequate in number, and demonstrate no significant morphologic abnormalities. There are no circulating blasts, plasma cells, or abnormal lymphocytes. Platelets are decreased in number, and demonstrate no significant morphologic abnormalities.     10/13/2021, EPO 1,966.  Haptoglobin < 8.  .  Reticulocyte 4%.  10/15/2021, WBC 3.71 hemoglobin 7.6 .3 platelets 11.  10/18/2021, MARTA negative.    10/22/2021, PNH profile:  Comment: Peripheral Blood:   Glycosylphosphatidylinositol (GPI) anchor deficient cells detected (4.48% of granulocytes, 5.93% of monocytes, 0.07% of red blood cells have features of type II red blood cells, 0.50% of red blood cells have features of type III red blood cells).  10/23/2021, CT chest angio:  No evidence of pulmonary embolus. No acute findings.    10/23/2021, CT abd angio:  1.  No major arterial occlusion or flow-limiting stenosis. Nonaneurysmal  abdominal aorta with heavy atherosclerotic calcification.  2.  2.6 cm indeterminate RIGHT adnexal lesion. Recommend correlation  with pelvic  ultrasound.  11/8/2021, type & screen, anti-E antibody  11/12/2021, type & screen, anti-E antibody and anti-D antibody  11/12/2021, [id number] Activity, >100.0 (>66.8) (Normal result).  11/19/2021, CT chest/abd at [place], unremarkable.  12/2/221, see hemolytic markers at bottom of PN from 12/2/2021.  12/6/2021, MARTA IgG positive ([facility]).  12/8/2021, MARTA negative ([place]).  12/14/2021, MARTA negative ([facility]).       MEDICAL HISTORY   has a past medical history of Acid reflux, Anemia, and PNH (paroxysmal nocturnal hemoglobinuria) ([facility]).   HEALTH MAINTENANCE ITEMS  Health Maintenance Due   Topic Date Due   • COLORECTAL CANCER SCREENING  Never done   • ANNUAL PHYSICAL  Never done   • TDAP/TD VACCINES (1 - Tdap) Never done   • ZOSTER VACCINE (1 of 2) Never done   • HEPATITIS C SCREENING  Never done   • COVID-19 Vaccine (3 - Booster for Moderna series) 09/30/2021     <no information>  Last Completed Colonoscopy     This patient has no relevant Health Maintenance data.        Immunization History   Administered Date(s) Administered   • COVID-19 (MODERNA) 1st, 2nd, 3rd Dose Only 03/01/2021, 03/31/2021   • Hepatitis B Vaccine Adult IM 05/10/2007   • Meningococcal B,(Bexsero) 10/22/2021, 11/23/2021   • Meningococcal MCV4P (Menactra) 10/22/2021     Last Completed Mammogram          MAMMOGRAM (Every 2 Years) Next due on 9/1/2023 09/01/2021  Outside Procedure: HC MAMMOGRAM SCREENING BILAT DIGITAL W CAD              FAMILY HISTORY  Family History   Problem Relation Age of Onset   • Diabetes Mother    • Hypertension Mother    • Heart attack Mother    • Hypertension Father    • Heart attack Father        Cancer-related family history is not on file.   SURGICAL HISTORY   has a past surgical history that includes Appendectomy.  SOCIAL HISTORY  Social History     Socioeconomic History   • Marital status:    Tobacco Use   • Smoking status: Never Smoker   • Smokeless tobacco: Never Used   Vaping Use   • Vaping Use: Never used  "  Substance and Sexual Activity   • Alcohol use: Not Currently   • Drug use: Never   • Sexual activity: Yes     MEDICATIONS:     Current Outpatient Medications   Medication Instructions   • eltrombopag (PROMACTA) 100 mg, Oral, Daily, Administer on an empty stomach, 1 hour before or 2 hours after a meal.   • folic acid (FOLVITE) 1 mg, Oral, Daily   • levoFLOXacin (LEVAQUIN) 500 mg, Oral, Daily, Take on tab daily, for prevention of bacterial infection. Start on 12/16/2021, day after last dose of dexamethasone.   • pantoprazole (PROTONIX) 20 mg, Oral, Daily   • potassium chloride (K-DUR,KLOR-CON) 20 MEQ CR tablet 20 mEq, Oral, 2 Times Daily   • sulfamethoxazole-trimethoprim (BACTRIM DS,SEPTRA DS) 800-160 MG per tablet 1 tablet, Oral, 3 Times Weekly, Take one tab every Monday, Wednesday and Friday (for prevention of bacterial infection).   • valACYclovir (VALTREX) 1,000 mg, Oral, Daily, Take one tab daily for prevention of shingles.      ALLERGIES:   No Known Allergies  ROS:   Pertinent positive and negative findings as noted in HPI.   PHYSICAL EXAM:   /66   Pulse 117   Temp 95.7 °F (35.4 °C)   Resp 16   Ht 154.9 cm (61\")   Wt 62.8 kg (138 lb 6.4 oz)   SpO2 98%   BMI 26.15 kg/m²  Body surface area is 1.62 meters squared.   Pain Score    12/16/21 0802   PainSc: 0-No pain     Alert, oriented x3, cooperative, not in distress.  HEENT: Normocephalic, wearing a facemask.  Lungs: No tachypnea.   Extremities: No ankle edema.  Neurologic: Moves all extremities.    INVESTIGATIONS/LABS:     Lab Results - Last 18 Months   Lab Units 12/14/21  0811 12/08/21  0814 12/06/21  0951 12/02/21  1027 11/29/21  0940 11/24/21  0935 11/22/21  1112 11/15/21  0910 11/15/21  0910 11/12/21  0918 11/12/21  0918 11/09/21  1041 11/09/21  1041 11/08/21  0915 11/05/21  0914 11/05/21  0914 11/01/21  0806 11/01/21  0806 10/21/21  0800 10/18/21  1018 10/15/21  1015   WBC 10*3/mm3 2.72*  --  2.40* 2.76* 2.22* 3.09* 2.94*  --  3.48   < > 3.45   " < > 3.61 2.74*   < > 3.40   < > 4.36   < >   < > 3.71   HEMOGLOBIN g/dL 6.3*  --  7.8* 6.7* 7.3* 7.9* 7.7*  --  7.9*   < > 6.9*   < > 7.3* 7.3*   < > 8.1*   < > 7.4*   < >   < > 7.6*   HEMATOCRIT % 18.7*  --  23.0* 20.4* 21.6* 23.4* 22.8*  --  22.9*   < > 20.7*   < > 21.3* 20.9*   < > 24.1*   < > 21.2*   < >   < > 22.4*   MCV fL 89.0  --  88.5 88.7 87.4 87.6 88.4  --  94.2   < > 96.3   < > 96.8 95.9   < > 95.6   < > 100.0*   < >   < > 102.3*   PLATELETS 10*3/mm3 3*  --  3* 3* 2* 2* 2*  --  4*   < > 8*   < > 8* 6*   < > 7*   < > 10*   < >   < > 11*   IMM GRAN % %  --   --   --   --   --   --   --   --   --   --  0.3  --   --  0.4  --   --   --  0.2  --   --  0.3   NEUTROS ABS 10*3/mm3 1.99 0.74* 0.53* 0.74* 0.68* 1.18* 0.45*   < > 1.29*   < > 1.29*   < > 1.19* 1.10*   < > 1.09*   < > 1.79   < >   < > 1.33*   LYMPHS ABS 10*3/mm3 0.54* 1.63 1.66 1.82 1.45 1.59 2.20  --  1.84  --  1.77  --  1.99 1.32  --  1.81   < > 2.14   < >   < > 1.96   MONOS ABS x10(3)/mcL  --  0.18*  --   --   --  0.30  --   --  0.32  --  0.34  --   --  0.27  --  0.44   < > 0.36  --    < > 0.37   EOS ABS 10*3/mm3 0.11 0.02*  --   --  0.02 0.01  --   --  0.02   < > 0.03  --  0.11 0.04   < > 0.06   < > 0.05   < >   < > 0.03   EOSINOPHIL ABS %  --  0.9  --   --   --   --   --   --   --   --   --   --   --   --   --   --   --   --   --   --   --    BASOS ABS 10*3/mm3  --   --   --   --  0.02 0.00  --   --  0.00  --  0.01  --   --  0.00  --  0.00   < > 0.01  --    < > 0.01   IMMATURE GRANS (ABS) 10*3/mm3  --   --   --   --   --   --   --   --   --   --  0.01  --   --  0.01  --   --   --  0.01  --   --  0.01   NRBC /100 WBC  --   --   --   --   --   --   --   --   --   --  0.0  --   --  0.0  --   --   --  0.0  --   --  0.5*   NEUTROPHIL % % 73.0  --  22.0* 25.8* 29.6*  --  15.2*  --   --   --   --   --  33.0*  --   --   --   --   --    < >   < >  --    MONOCYTES % % 3.0* 7.0 9.0 6.2 2.0*  --  10.1  --   --   --   --    < > 9.0  --   --   --   --   --     < >   < >  --    BASOPHIL % %  --   --   --   --  1.0  --   --   --   --   --   --   --   --   --   --   --   --   --   --   --   --    ATYP LYMPH % % 7.0*  --  4.0 1.0  --   --  7.1*  --   --   --   --   --   --   --   --   --   --   --   --   --   --    ANISOCYTOSIS   --  2+ Slight/1+ Slight/1+ Slight/1+  --  Slight/1+  --   --   --   --   --  Mod/2+  --   --   --   --   --    < >   < >  --     < > = values in this interval not displayed.       Lab Results - Last 18 Months   Lab Units 11/29/21  0940 11/22/21  1112 11/15/21  0910 11/12/21  0918 11/09/21  1041 11/08/21  0915   BUN mg/dL 10 13 10 9 7* 8   CREATININE mg/dL 0.51* 0.57 0.50* 0.56* 0.46* 0.44*   GLUCOSE mg/dL 164* 119* 130* 128* 101* 142*   SODIUM mmol/L 135* 136 137 137 137 137   POTASSIUM mmol/L 3.6 3.6 3.7 3.5 3.8 3.6   CO2 mmol/L 24.0 25.0 24.0 23.0 26.0 25.0   CHLORIDE mmol/L 101 101 102 101 102 103   ANION GAP mmol/L 10.0 10.0 11.0 13.0 9.0 9.0   BUN / CREAT RATIO  19.6 22.8 20.0 16.1 15.2 18.2   CALCIUM mg/dL 9.4 9.0 8.9 8.9 9.2 9.2   EGFR IF NONAFRICN AM mL/min/1.73 121 107 124 109 137 144   ALK PHOS U/L 59 57 68 73 75 69   TOTAL PROTEIN g/dL 8.4 9.2* 7.0 7.3 7.5 7.2   ALT (SGPT) U/L 31 25 25 29 35* 37*   AST (SGOT) U/L 24 20 20 22 24 29   BILIRUBIN mg/dL 0.5 0.8 0.6 0.7 0.7 0.7   ALBUMIN g/dL 4.10 4.00 4.40 4.50 4.60 4.40   GLOBULIN gm/dL 4.3 5.2 2.6 2.8 2.9 2.8       Lab Results - Last 18 Months   Lab Units 12/08/21  0814 12/06/21  0951 11/29/21  0940 11/22/21  1112 11/17/21  0810 11/15/21  0910   LDH unit/L 219 223* 206 223* 222* 230*       Lab Results - Last 18 Months   Lab Units 11/29/21  0940 10/18/21  1018 10/13/21  1151 10/13/21  1008 10/01/21  1116 09/27/21  0942   IRON mcg/dL  --  230*  --   --  163 141   TIBC mcg/dL  --  282*  --   --  222* 280*   IRON SATURATION %  --  82*  --   --  73 50   FERRITIN ng/mL  --  872.00*  --   --  418* 472.40*   VITAMIN B 12 pg/mL 603 665  --  753  --  534   FOLATE ng/mL >20.00 17.10  --  16.6  --   >20.00   TSH mcunit/mL  --   --  1.291  --   --   --        CT Head Without Contrast    Result Date: 10/23/2021   1.  No acute intracranial finding. 2.  Mild paranasal sinus mucosal thickening. 3.  Chronic microvascular ischemic white matter change. This report was finalized on 10/23/2021 11:54 by Dr. Justo Chaves MD.    CT Angiogram Abdomen Pelvis    Result Date: 10/23/2021   1.  No major arterial occlusion or flow-limiting stenosis. Nonaneurysmal abdominal aorta with heavy atherosclerotic calcification. 2.  2.6 cm indeterminate RIGHT adnexal lesion. Recommend correlation with pelvic ultrasound. This report was finalized on 10/23/2021 13:17 by Dr. Justo Chaves MD.    CT Angiogram Chest    Result Date: 10/23/2021   No evidence of pulmonary embolus. No acute findings. This report was finalized on 10/23/2021 13:12 by Dr. Justo Chaves MD.      ASSESSMENT:   Naveen's syndrome, see discussion in HPI and summary above.  The patient has chronic severe hemolytic anemia and severe ITP.  She is refractory to IVIG, Promacta, platelet transfusions.  She has received multiple PRBCs and fortunately has responded to the PRBCs.  She is being evaluated at [facility], we are following their recommendations regarding management of Naveen's syndrome.    Transfusional iron overload, untreated, it may need to be treated eventually.    PLAN/RECOMMENDATIONS:   Start chemo today (rituximab weekly x 4).    Dexamethasone 40 mg PO daily x4, first dose today.  The patient is on prophylactic: Septra DS Mondays, Wednesdays, Fridays. Valtrex 500 mg daily.  Levaquin 500 mg daily (to start after finishing dexamethasone. The Levaquin duration needs to be addressed often depending on ANC, as per recommendation from Dr. Blanchard from [facility].  He recommends that if the ANC is less than 1000 the patient should remain on Levaquin.  The patient has had neutropenia probably autoimmune and with dexamethasone the ANC is expected to  change.    VST then chemo in one week (12/23/2021).    (patient will have labs at [facility] on 12/22/2021).    DX/ORDERS:   Diagnoses and all orders for this visit:    1. Palmer' syndrome (HCC) (Primary)    2. Naveen's syndrome (HCC)  -     CBC and Differential; Future  -     Comprehensive metabolic panel; Future  -     Lab Appointment Request; Future  -     Clinic Appointment Request; Future  -     ONCBCN CALCULATED LENGTH INFUSION APPOINTMENT REQUEST 4; Future  -     CBC and Differential; Future  -     Lab Appointment Request; Future  -     ONCBCN CALCULATED LENGTH INFUSION APPOINTMENT REQUEST 4; Future  -     CBC and Differential; Future  -     Lab Appointment Request; Future  -     ONCBCN CALCULATED LENGTH INFUSION APPOINTMENT REQUEST 4; Future  -     CBC and Differential; Future  -     Lab Appointment Request; Future  -     ONCBCN CALCULATED LENGTH INFUSION APPOINTMENT REQUEST 4; Future        Future Appointments   Date Time Provider Department Center   12/21/2021  1:00 PM CHAIR 13 [facility] PAD OP INFU ONC [facility] PAD OIONC PAD   12/23/2021  8:00 AM BH PAD CANCER CTR LAB BH PAD CCLAB PAD   12/23/2021  8:15 AM CHAIR 21 [facility] PAD OP INFU ONC BH PAD O[facility] PAD   12/30/2021  8:15 AM BH PAD CANCER CTR LAB BH PAD CCLAB PAD   12/30/2021  8:30 AM CHAIR 23 BH PAD OP INFU ONC BH PAD OIONC PAD   1/6/2022  8:15 AM BH PAD CANCER CTR LAB BH PAD CCLAB PAD   1/6/2022  8:30 AM CHAIR 04 BH PAD OP INFU ONC BH PAD OIONC PAD   1/13/2022  7:30 AM BH PAD CANCER CTR LAB BH PAD CCLAB PAD   1/13/2022  8:00 AM Amador Medrano MD [facility] ONC PAD PAD   1/17/2022 11:45 AM BH PAD CANCER CTR LAB BH PAD CCLAB PAD   1/17/2022 12:15 PM CHAIR 12 BH PAD OP INFU ONC BH PAD OIONC PAD        Devante Galvez MD  12/16/2021    cc: Faith Alcaraz APRN

## 2021-12-16 NOTE — PROGRESS NOTES
0855 Spoke with Donna Rendon LPN, patient saw Dr. Galvez today Ok to start Rituxan without labs, noted labs from 12/14/21.Mónica HOBSON

## 2021-12-17 ENCOUNTER — TELEPHONE (OUTPATIENT)
Dept: PHARMACY | Facility: HOSPITAL | Age: 66
End: 2021-12-17

## 2021-12-17 ENCOUNTER — SPECIALTY PHARMACY (OUTPATIENT)
Dept: ONCOLOGY | Facility: HOSPITAL | Age: 66
End: 2021-12-17

## 2021-12-17 NOTE — TELEPHONE ENCOUNTER
"[facility] Pharmacy Services    Oncology Pharmacy telephone encounter        Received call from The Noel family, Ratna and Jayce today. They called to question a symptom that Ratna was dealing with today. Overall she feels okay, and per their report she has been \"taking it easy\" today and resting a lot purposefully. However, they noted that when Tal got up from rest to go to the bathroom, she experienced mild to moderate shortness of breath and tachycardia. Both of these symptoms resolved quickly after sitting back down. They report that there is just an increase in fatigue, shortness of breath, and tachycardia with activity. Again, this rapidly resolves when she is not doing activities.     Patient denies: fever, severe shortness of breath, the feeling of throat closing, chest pain, dizziness, headache, gastrointestinal symptoms, cough, or any severe symptoms.     Patient's most recent blood pressure was approximately 140/50 monitored with an at-home machine. Her pulse oximeter read 95% at this episode.     We discussed the above symptoms. They were instructed to monitor the patient's symptoms and vitals hourly. If blood pressure decreases, or if heart rate decreases to a dangerous level, or if heart rate increases to a tachycardic range persistently they should seek immediate healthcare evaluation. Also instructed to seek evaluation for the following issues: worsening shortness of breath or shortness of breath at rest, fever, chills, rigors, hypotension, hypertension, bradycardia, persistent tachycardia at rest, gastrointestinal symptoms, worsening malaise or severe malaise, persistent or worsening fatigue, or any symptom which is significantly bothersome. They were instructed to seek emergency care for any medical emergency.     They were instructed to take care when standing from a sitting position. While laying, sit up for a few minutes before standing. Do not stand " directly from a supine position.     The patient was instructed to stay well hydrated. Monitor vitals hourly. Do not make any changes to diet. Patient may take Tylenol if appropriate per her physicians if she experiences headache or body aches. She was instructed to continue to rest and not perform strenuous activities.     The Cohen family was given the triage nurse line at [facility] if they need to discuss symptoms should they arise again.     In summary: patient got up to go to the restroom and while standing up and walking around she had a mild to moderate (non-severe and non-emergent) episode of shortness of breath and tachycardia (with a reported pulse rate of around 110-120). These symptoms resolved almost immediately when the patient sat to rest. There are no reported symptoms at rest. There were no other associated symptoms at all. The symptoms were non-severe in nature. The patient was instructed to seek emergency care in the event of a medical emergency. The patient was given the [facility] 24-hour nurse hotline to call if they have questions later about symptoms and need to know what to do.     The patient was counseled on side effects of Rituxan as she had an infusion of this yesterday. They understand the effects of the medication.     Asad Greco, PharmD  12/17/21  14:54 CST

## 2021-12-18 LAB
BH BB BLOOD EXPIRATION DATE: NORMAL
BH BB BLOOD EXPIRATION DATE: NORMAL
BH BB BLOOD TYPE BARCODE: 600
BH BB BLOOD TYPE BARCODE: 600
BH BB DISPENSE STATUS: NORMAL
BH BB DISPENSE STATUS: NORMAL
BH BB PRODUCT CODE: NORMAL
BH BB PRODUCT CODE: NORMAL
BH BB UNIT NUMBER: NORMAL
BH BB UNIT NUMBER: NORMAL
CROSSMATCH INTERPRETATION: NORMAL
CROSSMATCH INTERPRETATION: NORMAL
UNIT  ABO: NORMAL
UNIT  ABO: NORMAL
UNIT  RH: NORMAL
UNIT  RH: NORMAL

## 2021-12-20 RX ORDER — FOLIC ACID 1 MG/1
1 TABLET ORAL DAILY
Qty: 90 TABLET | Refills: 3 | Status: SHIPPED | OUTPATIENT
Start: 2021-12-20

## 2021-12-21 ENCOUNTER — APPOINTMENT (OUTPATIENT)
Dept: LAB | Facility: HOSPITAL | Age: 66
End: 2021-12-21

## 2021-12-21 ENCOUNTER — SPECIALTY PHARMACY (OUTPATIENT)
Dept: ONCOLOGY | Facility: HOSPITAL | Age: 66
End: 2021-12-21

## 2021-12-21 ENCOUNTER — APPOINTMENT (OUTPATIENT)
Dept: ONCOLOGY | Facility: HOSPITAL | Age: 66
End: 2021-12-21

## 2021-12-21 ENCOUNTER — INFUSION (OUTPATIENT)
Dept: ONCOLOGY | Facility: HOSPITAL | Age: 66
End: 2021-12-21

## 2021-12-21 VITALS
RESPIRATION RATE: 18 BRPM | HEART RATE: 119 BPM | SYSTOLIC BLOOD PRESSURE: 151 MMHG | WEIGHT: 135.8 LBS | HEIGHT: 61 IN | BODY MASS INDEX: 25.64 KG/M2 | TEMPERATURE: 97.6 F | OXYGEN SATURATION: 100 % | DIASTOLIC BLOOD PRESSURE: 53 MMHG

## 2021-12-21 DIAGNOSIS — D69.3 IMMUNE THROMBOCYTOPENIA (HCC): ICD-10-CM

## 2021-12-21 DIAGNOSIS — D59.5 PNH (PAROXYSMAL NOCTURNAL HEMOGLOBINURIA) (HCC): Primary | ICD-10-CM

## 2021-12-21 PROCEDURE — 25010000002: Performed by: INTERNAL MEDICINE

## 2021-12-21 PROCEDURE — 90734 MENACWYD/MENACWYCRM VACC IM: CPT | Performed by: INTERNAL MEDICINE

## 2021-12-21 PROCEDURE — 96372 THER/PROPH/DIAG INJ SC/IM: CPT

## 2021-12-21 PROCEDURE — 90471 IMMUNIZATION ADMIN: CPT

## 2021-12-21 RX ADMIN — NEISSERIA MENINGITIDIS GROUP A CAPSULAR POLYSACCHARIDE DIPHTHERIA TOXOID CONJUGATE ANTIGEN, NEISSERIA MENINGITIDIS GROUP C CAPSULAR POLYSACCHARIDE DIPHTHERIA TOXOID CONJUGATE ANTIGEN, NEISSERIA MENINGITIDIS GROUP Y CAPSULAR POLYSACCHARIDE DIPHTHERIA TOXOID CONJUGATE ANTIGEN, AND NEISSERIA MENINGITIDIS GROUP W-135 CAPSULAR POLYSACCHARIDE DIPHTHERIA TOXOID CONJUGATE ANTIGEN 0.5 ML: 4; 4; 4; 4 INJECTION, SOLUTION INTRAMUSCULAR at 13:41

## 2021-12-21 NOTE — PROGRESS NOTES
[facility] Specialty Pharmacy Services    Oral Oncology Patient Follow-Up      Consult Details  Consulting Provider:   Amador Medrano MD ([facility]) - FUTURE   Medication and Regimen:  PROMACTA 100 mg PO DAILY     Prescription Review  Dosing & Interactions:   Reviewed, interaction noted, will  patient regarding separation of medications with large cations, FOLVITE could be one. She takes Promacta first thing in AM and waits two hours prior to food or other meds..   Current Specialty Pharmacy [facility]     Intervention(s):                  I spoke with Jayce, patient's , today. We have been talking often regarding her various treatments and care coordination.     Ratna has been taking Promacta as prescribed. She has been very adherent to her treatment plan and medication regimen as well as multiple office visits between multiple providers and lab checks.     There are no tolerability issues reported with Promacta.     Her new dose of 100 mg PO daily that she has been taking from the 50 mg daily RX has now been approved on insurance. Unfortunately she cannot any longer fill at our pharmacy here but she can at [facility]. The prescription was forwarded there. We discussed this and they understand. Jayce has a coupon card that may assist with the copayment. Right now a 30 D/S of medication at this dose costs $41.60. They are okay with that, but I did recommend that they call this card's processing information into the pharmacy to see if it results in costs savings. They will do so.     The prescription is at [facility] and I put a rush on it.      Summary:    Patient doing well on current dose of Promacta. She is compliant and has appropriate questions which are answered to the best of my ability. No issues at present. Continue routine follow up.      Asad Greco, PatriciaD  12/21/2021 11:10 CST

## 2021-12-22 DIAGNOSIS — D69.41 EVAN'S SYNDROME (HCC): ICD-10-CM

## 2021-12-22 DIAGNOSIS — D69.3 IMMUNE THROMBOCYTOPENIA (HCC): Primary | ICD-10-CM

## 2021-12-23 ENCOUNTER — SPECIALTY PHARMACY (OUTPATIENT)
Dept: ONCOLOGY | Facility: HOSPITAL | Age: 66
End: 2021-12-23

## 2021-12-23 ENCOUNTER — LAB (OUTPATIENT)
Dept: LAB | Facility: HOSPITAL | Age: 66
End: 2021-12-23

## 2021-12-23 ENCOUNTER — INFUSION (OUTPATIENT)
Dept: ONCOLOGY | Facility: HOSPITAL | Age: 66
End: 2021-12-23

## 2021-12-23 VITALS
TEMPERATURE: 98.8 F | OXYGEN SATURATION: 99 % | WEIGHT: 134 LBS | RESPIRATION RATE: 18 BRPM | HEART RATE: 95 BPM | SYSTOLIC BLOOD PRESSURE: 135 MMHG | BODY MASS INDEX: 25.3 KG/M2 | HEIGHT: 61 IN | DIASTOLIC BLOOD PRESSURE: 42 MMHG

## 2021-12-23 DIAGNOSIS — D69.41 EVAN'S SYNDROME (HCC): ICD-10-CM

## 2021-12-23 DIAGNOSIS — D69.41 EVAN'S SYNDROME (HCC): Primary | ICD-10-CM

## 2021-12-23 DIAGNOSIS — D59.5 PNH (PAROXYSMAL NOCTURNAL HEMOGLOBINURIA) (HCC): Primary | ICD-10-CM

## 2021-12-23 DIAGNOSIS — D69.3 IMMUNE THROMBOCYTOPENIA (HCC): ICD-10-CM

## 2021-12-23 LAB
ANISOCYTOSIS BLD QL: ABNORMAL
DEPRECATED RDW RBC AUTO: 49.1 FL (ref 37–54)
ERYTHROCYTE [DISTWIDTH] IN BLOOD BY AUTOMATED COUNT: 17.4 % (ref 12.3–15.4)
HCT VFR BLD AUTO: 22.4 % (ref 34–46.6)
HGB BLD-MCNC: 7.5 G/DL (ref 12–15.9)
HYPOCHROMIA BLD QL: ABNORMAL
LYMPHOCYTES # BLD MANUAL: 1.3 10*3/MM3 (ref 0.7–3.1)
LYMPHOCYTES NFR BLD MANUAL: 8 % (ref 5–12)
MCH RBC QN AUTO: 28.8 PG (ref 26.6–33)
MCHC RBC AUTO-ENTMCNC: 33.5 G/DL (ref 31.5–35.7)
MCV RBC AUTO: 86.2 FL (ref 79–97)
MICROCYTES BLD QL: ABNORMAL
MONOCYTES # BLD: 0.19 10*3/MM3 (ref 0.1–0.9)
NEUTROPHILS # BLD AUTO: 0.91 10*3/MM3 (ref 1.7–7)
NEUTROPHILS NFR BLD MANUAL: 38 % (ref 42.7–76)
PLATELET # BLD AUTO: 57 10*3/MM3 (ref 140–450)
PMV BLD AUTO: 7.8 FL (ref 6–12)
POLYCHROMASIA BLD QL SMEAR: ABNORMAL
RBC # BLD AUTO: 2.6 10*6/MM3 (ref 3.77–5.28)
SMALL PLATELETS BLD QL SMEAR: ABNORMAL
VARIANT LYMPHS NFR BLD MANUAL: 48 % (ref 19.6–45.3)
VARIANT LYMPHS NFR BLD MANUAL: 6 % (ref 0–5)
WBC MORPH BLD: NORMAL
WBC NRBC COR # BLD: 2.4 10*3/MM3 (ref 3.4–10.8)

## 2021-12-23 PROCEDURE — 96415 CHEMO IV INFUSION ADDL HR: CPT

## 2021-12-23 PROCEDURE — 85025 COMPLETE CBC W/AUTO DIFF WBC: CPT

## 2021-12-23 PROCEDURE — 96367 TX/PROPH/DG ADDL SEQ IV INF: CPT

## 2021-12-23 PROCEDURE — 85007 BL SMEAR W/DIFF WBC COUNT: CPT

## 2021-12-23 PROCEDURE — 25010000002 RITUXIMAB 10 MG/ML SOLUTION 10 ML VIAL: Performed by: INTERNAL MEDICINE

## 2021-12-23 PROCEDURE — 25010000002 DIPHENHYDRAMINE PER 50 MG: Performed by: INTERNAL MEDICINE

## 2021-12-23 PROCEDURE — 36415 COLL VENOUS BLD VENIPUNCTURE: CPT

## 2021-12-23 PROCEDURE — 96413 CHEMO IV INFUSION 1 HR: CPT

## 2021-12-23 PROCEDURE — 25010000002 RITUXIMAB 10 MG/ML SOLUTION 50 ML VIAL: Performed by: INTERNAL MEDICINE

## 2021-12-23 RX ORDER — LORATADINE 10 MG/1
10 TABLET ORAL DAILY
COMMUNITY

## 2021-12-23 RX ORDER — MEPERIDINE HYDROCHLORIDE 25 MG/ML
25 INJECTION INTRAMUSCULAR; INTRAVENOUS; SUBCUTANEOUS
Status: DISCONTINUED | OUTPATIENT
Start: 2021-12-23 | End: 2021-12-23 | Stop reason: HOSPADM

## 2021-12-23 RX ORDER — DIPHENHYDRAMINE HYDROCHLORIDE 50 MG/ML
50 INJECTION INTRAMUSCULAR; INTRAVENOUS AS NEEDED
Status: DISCONTINUED | OUTPATIENT
Start: 2021-12-23 | End: 2021-12-23 | Stop reason: HOSPADM

## 2021-12-23 RX ORDER — ACETAMINOPHEN 325 MG/1
650 TABLET ORAL ONCE
Status: COMPLETED | OUTPATIENT
Start: 2021-12-23 | End: 2021-12-23

## 2021-12-23 RX ORDER — FAMOTIDINE 10 MG/ML
20 INJECTION, SOLUTION INTRAVENOUS AS NEEDED
Status: DISCONTINUED | OUTPATIENT
Start: 2021-12-23 | End: 2021-12-23 | Stop reason: HOSPADM

## 2021-12-23 RX ORDER — SODIUM CHLORIDE 9 MG/ML
250 INJECTION, SOLUTION INTRAVENOUS ONCE
Status: COMPLETED | OUTPATIENT
Start: 2021-12-23 | End: 2021-12-23

## 2021-12-23 RX ADMIN — ACETAMINOPHEN 650 MG: 325 TABLET ORAL at 10:10

## 2021-12-23 RX ADMIN — SODIUM CHLORIDE 250 ML: 9 INJECTION, SOLUTION INTRAVENOUS at 10:10

## 2021-12-23 RX ADMIN — DIPHENHYDRAMINE HYDROCHLORIDE 25 MG: 50 INJECTION, SOLUTION INTRAMUSCULAR; INTRAVENOUS at 10:12

## 2021-12-23 RX ADMIN — RITUXIMAB 600 MG: 10 INJECTION, SOLUTION INTRAVENOUS at 10:34

## 2021-12-23 NOTE — PROGRESS NOTES
[facility] Specialty Pharmacy Services    Oral Oncology Patient Follow-Up      Consult Details  Consulting Provider:   Amador Medrano MD ([facility] [place] [facility]) - FUTURE   Medication and Regimen:  PROMACTA 100 mg PO DAILY     Prescription Review  Dosing & Interactions:   Reviewed, appropriate and no significant interaction noted at this time..   Current Specialty Pharmacy [facility]     Intervention(s):                  Ratna was here for a Rituxan infusion today, and they had requested for Jayce to have an in-person visit with me today to discuss treatment plan. We went over their concerns and questions and spent a lot of time discussing the plan of care. Main questions were as follows:  1. Doctor in [place] wanted to increase Promacta to 150 mg PO daily but we just got approved the 100 mg PO Daily. Just spoke with Jayce again, the doctor has OK'd to continue the current dose for another 2 weeks.     2. Signs/symptoms of adverse effects discussed regarding Promacta and Rituxan    3. Neutropenia precautions - patient has been taken off of Levaquin and other antimicrobial prophylaxis as her ANC is acceptable. We discussed typical parameters for neutropenia prophylaxis    All questions answered to his apparent satisfaction. I then met with Ratna at her chair to answer any questions she had.      Summary:    Promacta will stay at present dose for another 2 weeks at least as platelets were acceptable today. It is worthy to note that she had platelet transfusion yesterday in [place]. Will continue to follow this patient closely.      Asad Greco, PharmD  12/23/2021 15:07 CST

## 2021-12-27 ENCOUNTER — TELEPHONE (OUTPATIENT)
Dept: ONCOLOGY | Facility: CLINIC | Age: 66
End: 2021-12-27

## 2021-12-27 NOTE — TELEPHONE ENCOUNTER
Call from the patient's . He is requesting a lab appt for tomorrow. The patient often requires blood transfusions and he reports that she is having symptoms of anemia. She has sob with exertion. Dr. Medrano has standing weekly CBC orders in the chart. Call sent to Mehreen to schedule.

## 2021-12-28 ENCOUNTER — TELEPHONE (OUTPATIENT)
Dept: ONCOLOGY | Facility: CLINIC | Age: 66
End: 2021-12-28

## 2021-12-28 ENCOUNTER — LAB (OUTPATIENT)
Dept: LAB | Facility: HOSPITAL | Age: 66
End: 2021-12-28

## 2021-12-28 ENCOUNTER — APPOINTMENT (OUTPATIENT)
Dept: LAB | Facility: HOSPITAL | Age: 66
End: 2021-12-28

## 2021-12-28 ENCOUNTER — INFUSION (OUTPATIENT)
Dept: ONCOLOGY | Facility: HOSPITAL | Age: 66
End: 2021-12-28

## 2021-12-28 ENCOUNTER — APPOINTMENT (OUTPATIENT)
Dept: ONCOLOGY | Facility: HOSPITAL | Age: 66
End: 2021-12-28

## 2021-12-28 VITALS
OXYGEN SATURATION: 97 % | TEMPERATURE: 97.4 F | RESPIRATION RATE: 16 BRPM | HEART RATE: 100 BPM | SYSTOLIC BLOOD PRESSURE: 145 MMHG | DIASTOLIC BLOOD PRESSURE: 54 MMHG

## 2021-12-28 DIAGNOSIS — D69.41 EVAN'S SYNDROME (HCC): ICD-10-CM

## 2021-12-28 DIAGNOSIS — D69.41 EVAN'S SYNDROME: ICD-10-CM

## 2021-12-28 DIAGNOSIS — D69.41 EVAN'S SYNDROME (HCC): Primary | ICD-10-CM

## 2021-12-28 DIAGNOSIS — D59.5 PNH (PAROXYSMAL NOCTURNAL HEMOGLOBINURIA): Primary | ICD-10-CM

## 2021-12-28 LAB
ABO GROUP BLD: NORMAL
ANTI-D: NORMAL
ANTI-E: NORMAL
BASOPHILS # BLD AUTO: 0 10*3/MM3 (ref 0–0.2)
BASOPHILS NFR BLD AUTO: 0 % (ref 0–1.5)
BLD GP AB SCN SERPL QL: POSITIVE
DEPRECATED RDW RBC AUTO: 46.4 FL (ref 37–54)
EOSINOPHIL # BLD AUTO: 0 10*3/MM3 (ref 0–0.4)
EOSINOPHIL NFR BLD AUTO: 0 % (ref 0.3–6.2)
ERYTHROCYTE [DISTWIDTH] IN BLOOD BY AUTOMATED COUNT: 16.8 % (ref 12.3–15.4)
HCT VFR BLD AUTO: 18.7 % (ref 34–46.6)
HGB BLD-MCNC: 6.2 G/DL (ref 12–15.9)
HOLD SPECIMEN: NORMAL
LYMPHOCYTES # BLD AUTO: 1.15 10*3/MM3 (ref 0.7–3.1)
LYMPHOCYTES NFR BLD AUTO: 51.6 % (ref 19.6–45.3)
MCH RBC QN AUTO: 28.2 PG (ref 26.6–33)
MCHC RBC AUTO-ENTMCNC: 33.2 G/DL (ref 31.5–35.7)
MCV RBC AUTO: 85 FL (ref 79–97)
MONOCYTES # BLD AUTO: 0.23 10*3/MM3 (ref 0.1–0.9)
MONOCYTES NFR BLD AUTO: 10.3 % (ref 5–12)
NEUTROPHILS NFR BLD AUTO: 0.84 10*3/MM3 (ref 1.7–7)
NEUTROPHILS NFR BLD AUTO: 37.7 % (ref 42.7–76)
PLATELET # BLD AUTO: 5 10*3/MM3 (ref 140–450)
PMV BLD AUTO: ABNORMAL FL
RBC # BLD AUTO: 2.2 10*6/MM3 (ref 3.77–5.28)
RH BLD: NEGATIVE
T&S EXPIRATION DATE: NORMAL
WBC NRBC COR # BLD: 2.23 10*3/MM3 (ref 3.4–10.8)

## 2021-12-28 PROCEDURE — 96374 THER/PROPH/DIAG INJ IV PUSH: CPT

## 2021-12-28 PROCEDURE — 86870 RBC ANTIBODY IDENTIFICATION: CPT

## 2021-12-28 PROCEDURE — 86902 BLOOD TYPE ANTIGEN DONOR EA: CPT

## 2021-12-28 PROCEDURE — 86900 BLOOD TYPING SEROLOGIC ABO: CPT

## 2021-12-28 PROCEDURE — 86920 COMPATIBILITY TEST SPIN: CPT

## 2021-12-28 PROCEDURE — P9016 RBC LEUKOCYTES REDUCED: HCPCS

## 2021-12-28 PROCEDURE — 86922 COMPATIBILITY TEST ANTIGLOB: CPT

## 2021-12-28 PROCEDURE — 36415 COLL VENOUS BLD VENIPUNCTURE: CPT

## 2021-12-28 PROCEDURE — 86901 BLOOD TYPING SEROLOGIC RH(D): CPT

## 2021-12-28 PROCEDURE — 25010000002 DIPHENHYDRAMINE PER 50 MG: Performed by: INTERNAL MEDICINE

## 2021-12-28 PROCEDURE — 85025 COMPLETE CBC W/AUTO DIFF WBC: CPT

## 2021-12-28 PROCEDURE — 36430 TRANSFUSION BLD/BLD COMPNT: CPT

## 2021-12-28 PROCEDURE — 86850 RBC ANTIBODY SCREEN: CPT

## 2021-12-28 PROCEDURE — 96375 TX/PRO/DX INJ NEW DRUG ADDON: CPT

## 2021-12-28 PROCEDURE — 25010000002 HYDROCORTISONE SODIUM SUCCINATE 100 MG RECONSTITUTED SOLUTION: Performed by: INTERNAL MEDICINE

## 2021-12-28 RX ORDER — FAMOTIDINE 10 MG/ML
20 INJECTION, SOLUTION INTRAVENOUS ONCE
Status: CANCELLED | OUTPATIENT
Start: 2021-12-28 | End: 2021-12-28

## 2021-12-28 RX ORDER — SODIUM CHLORIDE 9 MG/ML
250 INJECTION, SOLUTION INTRAVENOUS AS NEEDED
Status: CANCELLED | OUTPATIENT
Start: 2021-12-28

## 2021-12-28 RX ORDER — ACETAMINOPHEN 325 MG/1
650 TABLET ORAL ONCE
Status: CANCELLED | OUTPATIENT
Start: 2021-12-28 | End: 2021-12-28

## 2021-12-28 RX ORDER — DIPHENHYDRAMINE HYDROCHLORIDE 50 MG/ML
25 INJECTION INTRAMUSCULAR; INTRAVENOUS ONCE
Status: CANCELLED | OUTPATIENT
Start: 2021-12-28 | End: 2021-12-28

## 2021-12-28 RX ORDER — SODIUM CHLORIDE 9 MG/ML
250 INJECTION, SOLUTION INTRAVENOUS AS NEEDED
Status: DISCONTINUED | OUTPATIENT
Start: 2021-12-28 | End: 2021-12-28 | Stop reason: HOSPADM

## 2021-12-28 RX ORDER — DIPHENHYDRAMINE HYDROCHLORIDE 50 MG/ML
25 INJECTION INTRAMUSCULAR; INTRAVENOUS ONCE
Status: COMPLETED | OUTPATIENT
Start: 2021-12-28 | End: 2021-12-28

## 2021-12-28 RX ORDER — ACETAMINOPHEN 325 MG/1
650 TABLET ORAL ONCE
Status: COMPLETED | OUTPATIENT
Start: 2021-12-28 | End: 2021-12-28

## 2021-12-28 RX ORDER — FAMOTIDINE 10 MG/ML
20 INJECTION, SOLUTION INTRAVENOUS ONCE
Status: COMPLETED | OUTPATIENT
Start: 2021-12-28 | End: 2021-12-28

## 2021-12-28 RX ADMIN — HYDROCORTISONE SODIUM SUCCINATE 100 MG: 100 INJECTION, POWDER, FOR SOLUTION INTRAMUSCULAR; INTRAVENOUS at 11:49

## 2021-12-28 RX ADMIN — ACETAMINOPHEN 650 MG: 325 TABLET ORAL at 11:48

## 2021-12-28 RX ADMIN — SODIUM CHLORIDE 250 ML: 9 INJECTION, SOLUTION INTRAVENOUS at 11:48

## 2021-12-28 RX ADMIN — DIPHENHYDRAMINE HYDROCHLORIDE 25 MG: 50 INJECTION INTRAMUSCULAR; INTRAVENOUS at 11:48

## 2021-12-28 RX ADMIN — FAMOTIDINE 20 MG: 10 INJECTION INTRAVENOUS at 11:49

## 2021-12-28 NOTE — TELEPHONE ENCOUNTER
----- Message from Amit Goldberg, MD sent at 12/28/2021 10:19 AM CST -----  Needs platelets and PRBC's plus please send these results to [place]

## 2021-12-28 NOTE — TELEPHONE ENCOUNTER
Spoke with Jayce. Patient will have 1 unit prbcs  today with 1 unit platelets. She will have another unit of prbcs tomorrow.

## 2021-12-28 NOTE — TELEPHONE ENCOUNTER
Ana with Lab called with critical labs for Ratna.    Hemoglobin: 6.2  Hematocrit: 18.7  Platelet: 5    rp

## 2021-12-29 ENCOUNTER — INFUSION (OUTPATIENT)
Dept: ONCOLOGY | Facility: HOSPITAL | Age: 66
End: 2021-12-29

## 2021-12-29 ENCOUNTER — LAB (OUTPATIENT)
Dept: LAB | Facility: HOSPITAL | Age: 66
End: 2021-12-29

## 2021-12-29 VITALS
SYSTOLIC BLOOD PRESSURE: 125 MMHG | TEMPERATURE: 97.7 F | RESPIRATION RATE: 14 BRPM | HEART RATE: 79 BPM | OXYGEN SATURATION: 100 % | DIASTOLIC BLOOD PRESSURE: 44 MMHG

## 2021-12-29 DIAGNOSIS — D69.3 IMMUNE THROMBOCYTOPENIA: ICD-10-CM

## 2021-12-29 DIAGNOSIS — D69.41 EVAN'S SYNDROME: ICD-10-CM

## 2021-12-29 DIAGNOSIS — D69.41 EVAN'S SYNDROME (HCC): ICD-10-CM

## 2021-12-29 DIAGNOSIS — D69.41 EVAN'S SYNDROME (HCC): Primary | ICD-10-CM

## 2021-12-29 LAB
BH BB BLOOD EXPIRATION DATE: NORMAL
BH BB BLOOD TYPE BARCODE: 600
BH BB DISPENSE STATUS: NORMAL
BH BB PRODUCT CODE: NORMAL
BH BB UNIT NUMBER: NORMAL
CROSSMATCH INTERPRETATION: NORMAL
HCT VFR BLD AUTO: 22.2 % (ref 34–46.6)
HGB BLD-MCNC: 8.1 G/DL (ref 12–15.9)
HOLD SPECIMEN: NORMAL
UNIT  ABO: NORMAL
UNIT  RH: NORMAL

## 2021-12-29 PROCEDURE — 85014 HEMATOCRIT: CPT

## 2021-12-29 PROCEDURE — P9100 PATHOGEN TEST FOR PLATELETS: HCPCS

## 2021-12-29 PROCEDURE — P9035 PLATELET PHERES LEUKOREDUCED: HCPCS

## 2021-12-29 PROCEDURE — 36415 COLL VENOUS BLD VENIPUNCTURE: CPT

## 2021-12-29 PROCEDURE — 36430 TRANSFUSION BLD/BLD COMPNT: CPT

## 2021-12-29 PROCEDURE — 85018 HEMOGLOBIN: CPT

## 2021-12-29 PROCEDURE — 96374 THER/PROPH/DIAG INJ IV PUSH: CPT

## 2021-12-29 PROCEDURE — 96375 TX/PRO/DX INJ NEW DRUG ADDON: CPT

## 2021-12-29 PROCEDURE — 25010000002 DIPHENHYDRAMINE PER 50 MG: Performed by: INTERNAL MEDICINE

## 2021-12-29 RX ORDER — FAMOTIDINE 10 MG/ML
20 INJECTION, SOLUTION INTRAVENOUS ONCE
Status: COMPLETED | OUTPATIENT
Start: 2021-12-29 | End: 2021-12-29

## 2021-12-29 RX ORDER — SODIUM CHLORIDE 9 MG/ML
250 INJECTION, SOLUTION INTRAVENOUS AS NEEDED
Status: CANCELLED | OUTPATIENT
Start: 2021-12-29

## 2021-12-29 RX ORDER — ACETAMINOPHEN 325 MG/1
650 TABLET ORAL ONCE
Status: CANCELLED | OUTPATIENT
Start: 2021-12-29 | End: 2021-12-29

## 2021-12-29 RX ORDER — ACETAMINOPHEN 325 MG/1
650 TABLET ORAL ONCE
Status: COMPLETED | OUTPATIENT
Start: 2021-12-29 | End: 2021-12-29

## 2021-12-29 RX ORDER — FAMOTIDINE 10 MG/ML
20 INJECTION, SOLUTION INTRAVENOUS ONCE
Status: CANCELLED | OUTPATIENT
Start: 2021-12-29 | End: 2021-12-29

## 2021-12-29 RX ORDER — DIPHENHYDRAMINE HYDROCHLORIDE 50 MG/ML
25 INJECTION INTRAMUSCULAR; INTRAVENOUS ONCE
Status: CANCELLED | OUTPATIENT
Start: 2021-12-29 | End: 2021-12-29

## 2021-12-29 RX ORDER — DIPHENHYDRAMINE HYDROCHLORIDE 50 MG/ML
25 INJECTION INTRAMUSCULAR; INTRAVENOUS ONCE
Status: COMPLETED | OUTPATIENT
Start: 2021-12-29 | End: 2021-12-29

## 2021-12-29 RX ORDER — SODIUM CHLORIDE 9 MG/ML
250 INJECTION, SOLUTION INTRAVENOUS AS NEEDED
Status: DISCONTINUED | OUTPATIENT
Start: 2021-12-29 | End: 2021-12-29 | Stop reason: HOSPADM

## 2021-12-29 RX ADMIN — DIPHENHYDRAMINE HYDROCHLORIDE 25 MG: 50 INJECTION, SOLUTION INTRAMUSCULAR; INTRAVENOUS at 11:35

## 2021-12-29 RX ADMIN — FAMOTIDINE 20 MG: 10 INJECTION INTRAVENOUS at 11:35

## 2021-12-29 RX ADMIN — ACETAMINOPHEN 650 MG: 325 TABLET ORAL at 11:35

## 2021-12-29 RX ADMIN — SODIUM CHLORIDE 250 ML: 9 INJECTION, SOLUTION INTRAVENOUS at 11:35

## 2021-12-29 NOTE — PROGRESS NOTES
Notified Becki at Dr Antonio's office, Dr Goldberg taking call for Dr Antonio today, patient's HGB 8.1.Per new blood guidelines rt shortage hold second unit of blood r/t HGB 8.1 and will transfuse 1 unit of platelets per order.

## 2021-12-30 ENCOUNTER — TELEPHONE (OUTPATIENT)
Dept: ONCOLOGY | Facility: CLINIC | Age: 66
End: 2021-12-30

## 2021-12-30 ENCOUNTER — LAB (OUTPATIENT)
Dept: LAB | Facility: HOSPITAL | Age: 66
End: 2021-12-30

## 2021-12-30 ENCOUNTER — INFUSION (OUTPATIENT)
Dept: ONCOLOGY | Facility: HOSPITAL | Age: 66
End: 2021-12-30

## 2021-12-30 VITALS
WEIGHT: 140 LBS | TEMPERATURE: 98.9 F | DIASTOLIC BLOOD PRESSURE: 42 MMHG | HEART RATE: 83 BPM | SYSTOLIC BLOOD PRESSURE: 129 MMHG | RESPIRATION RATE: 16 BRPM | HEIGHT: 61 IN | BODY MASS INDEX: 26.43 KG/M2 | OXYGEN SATURATION: 100 %

## 2021-12-30 DIAGNOSIS — D69.3 IMMUNE THROMBOCYTOPENIA: ICD-10-CM

## 2021-12-30 DIAGNOSIS — D69.41 EVAN'S SYNDROME: ICD-10-CM

## 2021-12-30 DIAGNOSIS — D69.41 EVAN'S SYNDROME (HCC): Primary | ICD-10-CM

## 2021-12-30 LAB
BASOPHILS # BLD AUTO: 0 10*3/MM3 (ref 0–0.2)
BASOPHILS NFR BLD AUTO: 0 % (ref 0–1.5)
BH BB BLOOD EXPIRATION DATE: NORMAL
BH BB BLOOD TYPE BARCODE: 2800
BH BB DISPENSE STATUS: NORMAL
BH BB PRODUCT CODE: NORMAL
BH BB UNIT NUMBER: NORMAL
DEPRECATED RDW RBC AUTO: 47.1 FL (ref 37–54)
EOSINOPHIL # BLD AUTO: 0.01 10*3/MM3 (ref 0–0.4)
EOSINOPHIL NFR BLD AUTO: 0.4 % (ref 0.3–6.2)
ERYTHROCYTE [DISTWIDTH] IN BLOOD BY AUTOMATED COUNT: 16.1 % (ref 12.3–15.4)
HCT VFR BLD AUTO: 23 % (ref 34–46.6)
HGB BLD-MCNC: 7.8 G/DL (ref 12–15.9)
LYMPHOCYTES # BLD AUTO: 1.27 10*3/MM3 (ref 0.7–3.1)
LYMPHOCYTES NFR BLD AUTO: 52 % (ref 19.6–45.3)
MCH RBC QN AUTO: 29 PG (ref 26.6–33)
MCHC RBC AUTO-ENTMCNC: 33.9 G/DL (ref 31.5–35.7)
MCV RBC AUTO: 85.5 FL (ref 79–97)
MONOCYTES # BLD AUTO: 0.26 10*3/MM3 (ref 0.1–0.9)
MONOCYTES NFR BLD AUTO: 10.7 % (ref 5–12)
NEUTROPHILS NFR BLD AUTO: 0.89 10*3/MM3 (ref 1.7–7)
NEUTROPHILS NFR BLD AUTO: 36.5 % (ref 42.7–76)
PLATELET # BLD AUTO: 7 10*3/MM3 (ref 140–450)
RBC # BLD AUTO: 2.69 10*6/MM3 (ref 3.77–5.28)
UNIT  ABO: NORMAL
UNIT  RH: NORMAL
WBC NRBC COR # BLD: 2.44 10*3/MM3 (ref 3.4–10.8)

## 2021-12-30 PROCEDURE — 96413 CHEMO IV INFUSION 1 HR: CPT

## 2021-12-30 PROCEDURE — 85025 COMPLETE CBC W/AUTO DIFF WBC: CPT

## 2021-12-30 PROCEDURE — 25010000002 DIPHENHYDRAMINE PER 50 MG: Performed by: INTERNAL MEDICINE

## 2021-12-30 PROCEDURE — 96415 CHEMO IV INFUSION ADDL HR: CPT

## 2021-12-30 PROCEDURE — 25010000002 RITUXIMAB 10 MG/ML SOLUTION 10 ML VIAL: Performed by: INTERNAL MEDICINE

## 2021-12-30 PROCEDURE — 96367 TX/PROPH/DG ADDL SEQ IV INF: CPT

## 2021-12-30 PROCEDURE — 25010000002 RITUXIMAB 10 MG/ML SOLUTION 50 ML VIAL: Performed by: INTERNAL MEDICINE

## 2021-12-30 RX ORDER — FAMOTIDINE 10 MG/ML
20 INJECTION, SOLUTION INTRAVENOUS AS NEEDED
Status: DISCONTINUED | OUTPATIENT
Start: 2021-12-30 | End: 2021-12-30 | Stop reason: HOSPADM

## 2021-12-30 RX ORDER — DIPHENHYDRAMINE HYDROCHLORIDE 50 MG/ML
50 INJECTION INTRAMUSCULAR; INTRAVENOUS AS NEEDED
Status: DISCONTINUED | OUTPATIENT
Start: 2021-12-30 | End: 2021-12-30 | Stop reason: HOSPADM

## 2021-12-30 RX ORDER — MEPERIDINE HYDROCHLORIDE 25 MG/ML
25 INJECTION INTRAMUSCULAR; INTRAVENOUS; SUBCUTANEOUS
Status: DISCONTINUED | OUTPATIENT
Start: 2021-12-30 | End: 2021-12-30 | Stop reason: HOSPADM

## 2021-12-30 RX ORDER — ACETAMINOPHEN 325 MG/1
650 TABLET ORAL ONCE
Status: COMPLETED | OUTPATIENT
Start: 2021-12-30 | End: 2021-12-30

## 2021-12-30 RX ORDER — SODIUM CHLORIDE 9 MG/ML
250 INJECTION, SOLUTION INTRAVENOUS ONCE
Status: COMPLETED | OUTPATIENT
Start: 2021-12-30 | End: 2021-12-30

## 2021-12-30 RX ADMIN — ACETAMINOPHEN 650 MG: 325 TABLET ORAL at 09:20

## 2021-12-30 RX ADMIN — SODIUM CHLORIDE 250 ML: 9 INJECTION, SOLUTION INTRAVENOUS at 09:22

## 2021-12-30 RX ADMIN — RITUXIMAB 600 MG: 10 INJECTION, SOLUTION INTRAVENOUS at 09:49

## 2021-12-30 RX ADMIN — DIPHENHYDRAMINE HYDROCHLORIDE 25 MG: 50 INJECTION, SOLUTION INTRAMUSCULAR; INTRAVENOUS at 09:22

## 2021-12-30 NOTE — TELEPHONE ENCOUNTER
Angie with Lab called with a critical on Ratna.    Platelet: 7    Per Olivia we should continue with her treatment today.    rp

## 2022-01-01 ENCOUNTER — HOSPITAL ENCOUNTER (EMERGENCY)
Facility: HOSPITAL | Age: 67
Discharge: ANOTHER HOSPITAL | End: 2022-01-01
Admitting: EMERGENCY MEDICINE

## 2022-01-01 ENCOUNTER — APPOINTMENT (OUTPATIENT)
Dept: GENERAL RADIOLOGY | Facility: HOSPITAL | Age: 67
End: 2022-01-01

## 2022-01-01 ENCOUNTER — APPOINTMENT (OUTPATIENT)
Dept: CT IMAGING | Facility: HOSPITAL | Age: 67
End: 2022-01-01

## 2022-01-01 VITALS
BODY MASS INDEX: 26.06 KG/M2 | DIASTOLIC BLOOD PRESSURE: 48 MMHG | TEMPERATURE: 98.7 F | OXYGEN SATURATION: 96 % | HEART RATE: 99 BPM | RESPIRATION RATE: 16 BRPM | WEIGHT: 138 LBS | HEIGHT: 61 IN | SYSTOLIC BLOOD PRESSURE: 130 MMHG

## 2022-01-01 DIAGNOSIS — D69.41 EVAN'S SYNDROME: ICD-10-CM

## 2022-01-01 DIAGNOSIS — D69.3 IMMUNE THROMBOCYTOPENIA: ICD-10-CM

## 2022-01-01 DIAGNOSIS — K92.2 GASTROINTESTINAL HEMORRHAGE, UNSPECIFIED GASTROINTESTINAL HEMORRHAGE TYPE: Primary | ICD-10-CM

## 2022-01-01 DIAGNOSIS — D59.5 PNH (PAROXYSMAL NOCTURNAL HEMOGLOBINURIA): ICD-10-CM

## 2022-01-01 DIAGNOSIS — D59.10 AUTOIMMUNE HEMOLYTIC ANEMIA: ICD-10-CM

## 2022-01-01 LAB
ABO GROUP BLD: NORMAL
ALBUMIN SERPL-MCNC: 4.2 G/DL (ref 3.5–5.2)
ALBUMIN/GLOB SERPL: 1.2 G/DL
ALP SERPL-CCNC: 75 U/L (ref 39–117)
ALT SERPL W P-5'-P-CCNC: 29 U/L (ref 1–33)
ANION GAP SERPL CALCULATED.3IONS-SCNC: 13 MMOL/L (ref 5–15)
ANTI-D: NORMAL
APTT PPP: 33.2 SECONDS (ref 24.1–35)
AST SERPL-CCNC: 17 U/L (ref 1–32)
BACTERIA UR QL AUTO: ABNORMAL /HPF
BASOPHILS # BLD AUTO: 0 10*3/MM3 (ref 0–0.2)
BASOPHILS NFR BLD AUTO: 0 % (ref 0–1.5)
BH BB BLOOD EXPIRATION DATE: NORMAL
BH BB BLOOD EXPIRATION DATE: NORMAL
BH BB BLOOD TYPE BARCODE: 600
BH BB BLOOD TYPE BARCODE: 6200
BH BB DISPENSE STATUS: NORMAL
BH BB DISPENSE STATUS: NORMAL
BH BB PRODUCT CODE: NORMAL
BH BB PRODUCT CODE: NORMAL
BH BB UNIT NUMBER: NORMAL
BH BB UNIT NUMBER: NORMAL
BILIRUB SERPL-MCNC: 0.4 MG/DL (ref 0–1.2)
BILIRUB UR QL STRIP: NEGATIVE
BLD GP AB SCN SERPL QL: POSITIVE
BUN SERPL-MCNC: 8 MG/DL (ref 8–23)
BUN/CREAT SERPL: 11.9 (ref 7–25)
CALCIUM SPEC-SCNC: 9 MG/DL (ref 8.6–10.5)
CHLORIDE SERPL-SCNC: 103 MMOL/L (ref 98–107)
CLARITY UR: CLEAR
CO2 SERPL-SCNC: 25 MMOL/L (ref 22–29)
COLOR UR: YELLOW
CREAT SERPL-MCNC: 0.67 MG/DL (ref 0.57–1)
CROSSMATCH INTERPRETATION: NORMAL
DEPRECATED RDW RBC AUTO: 43.8 FL (ref 37–54)
DEVELOPER EXPIRATION DATE: 211
DEVELOPER LOT NUMBER: 211
EOSINOPHIL # BLD AUTO: 0.01 10*3/MM3 (ref 0–0.4)
EOSINOPHIL NFR BLD AUTO: 0.5 % (ref 0.3–6.2)
ERYTHROCYTE [DISTWIDTH] IN BLOOD BY AUTOMATED COUNT: 15.7 % (ref 12.3–15.4)
EXPIRATION DATE: ABNORMAL
FECAL OCCULT BLOOD SCREEN, POC: POSITIVE
GFR SERPL CREATININE-BSD FRML MDRD: 89 ML/MIN/1.73
GLOBULIN UR ELPH-MCNC: 3.4 GM/DL
GLUCOSE SERPL-MCNC: 139 MG/DL (ref 65–99)
GLUCOSE UR STRIP-MCNC: NEGATIVE MG/DL
HCT VFR BLD AUTO: 21.2 % (ref 34–46.6)
HGB BLD-MCNC: 7.7 G/DL (ref 12–15.9)
HGB UR QL STRIP.AUTO: ABNORMAL
HYALINE CASTS UR QL AUTO: ABNORMAL /LPF
INR PPP: 0.94 (ref 0.91–1.09)
KETONES UR QL STRIP: NEGATIVE
LEUKOCYTE ESTERASE UR QL STRIP.AUTO: NEGATIVE
LIPASE SERPL-CCNC: 26 U/L (ref 13–60)
LYMPHOCYTES # BLD AUTO: 1.14 10*3/MM3 (ref 0.7–3.1)
LYMPHOCYTES NFR BLD AUTO: 52.5 % (ref 19.6–45.3)
Lab: 211
MCH RBC QN AUTO: 29.7 PG (ref 26.6–33)
MCHC RBC AUTO-ENTMCNC: 36.3 G/DL (ref 31.5–35.7)
MCV RBC AUTO: 81.9 FL (ref 79–97)
MONOCYTES # BLD AUTO: 0.28 10*3/MM3 (ref 0.1–0.9)
MONOCYTES NFR BLD AUTO: 12.9 % (ref 5–12)
NEGATIVE CONTROL: NEGATIVE
NEUTROPHILS NFR BLD AUTO: 0.74 10*3/MM3 (ref 1.7–7)
NEUTROPHILS NFR BLD AUTO: 34.1 % (ref 42.7–76)
NITRITE UR QL STRIP: NEGATIVE
PH UR STRIP.AUTO: 5.5 [PH] (ref 5–8)
PLATELET # BLD AUTO: 3 10*3/MM3 (ref 140–450)
PMV BLD AUTO: ABNORMAL FL
POSITIVE CONTROL: POSITIVE
POTASSIUM SERPL-SCNC: 3.2 MMOL/L (ref 3.5–5.2)
PROT SERPL-MCNC: 7.6 G/DL (ref 6–8.5)
PROT UR QL STRIP: NEGATIVE
PROTHROMBIN TIME: 12.2 SECONDS (ref 11.9–14.6)
RBC # BLD AUTO: 2.59 10*6/MM3 (ref 3.77–5.28)
RBC # UR STRIP: ABNORMAL /HPF
REF LAB TEST METHOD: ABNORMAL
RH BLD: NEGATIVE
SARS-COV-2 RNA PNL SPEC NAA+PROBE: NOT DETECTED
SODIUM SERPL-SCNC: 141 MMOL/L (ref 136–145)
SP GR UR STRIP: <=1.005 (ref 1–1.03)
SQUAMOUS #/AREA URNS HPF: ABNORMAL /HPF
T&S EXPIRATION DATE: NORMAL
UNIT  ABO: NORMAL
UNIT  ABO: NORMAL
UNIT  RH: NORMAL
UNIT  RH: NORMAL
UROBILINOGEN UR QL STRIP: ABNORMAL
WBC # UR STRIP: ABNORMAL /HPF
WBC NRBC COR # BLD: 2.17 10*3/MM3 (ref 3.4–10.8)

## 2022-01-01 PROCEDURE — 86920 COMPATIBILITY TEST SPIN: CPT

## 2022-01-01 PROCEDURE — 74177 CT ABD & PELVIS W/CONTRAST: CPT

## 2022-01-01 PROCEDURE — 80053 COMPREHEN METABOLIC PANEL: CPT | Performed by: NURSE PRACTITIONER

## 2022-01-01 PROCEDURE — 71045 X-RAY EXAM CHEST 1 VIEW: CPT

## 2022-01-01 PROCEDURE — 93010 ELECTROCARDIOGRAM REPORT: CPT | Performed by: INTERNAL MEDICINE

## 2022-01-01 PROCEDURE — 25010000002 IOPAMIDOL 61 % SOLUTION: Performed by: NURSE PRACTITIONER

## 2022-01-01 PROCEDURE — 87635 SARS-COV-2 COVID-19 AMP PRB: CPT | Performed by: NURSE PRACTITIONER

## 2022-01-01 PROCEDURE — 86900 BLOOD TYPING SEROLOGIC ABO: CPT | Performed by: NURSE PRACTITIONER

## 2022-01-01 PROCEDURE — 85730 THROMBOPLASTIN TIME PARTIAL: CPT | Performed by: NURSE PRACTITIONER

## 2022-01-01 PROCEDURE — 82270 OCCULT BLOOD FECES: CPT | Performed by: NURSE PRACTITIONER

## 2022-01-01 PROCEDURE — 81001 URINALYSIS AUTO W/SCOPE: CPT | Performed by: NURSE PRACTITIONER

## 2022-01-01 PROCEDURE — 85610 PROTHROMBIN TIME: CPT | Performed by: NURSE PRACTITIONER

## 2022-01-01 PROCEDURE — 86901 BLOOD TYPING SEROLOGIC RH(D): CPT | Performed by: NURSE PRACTITIONER

## 2022-01-01 PROCEDURE — 83690 ASSAY OF LIPASE: CPT | Performed by: NURSE PRACTITIONER

## 2022-01-01 PROCEDURE — 86850 RBC ANTIBODY SCREEN: CPT | Performed by: NURSE PRACTITIONER

## 2022-01-01 PROCEDURE — 85025 COMPLETE CBC W/AUTO DIFF WBC: CPT | Performed by: NURSE PRACTITIONER

## 2022-01-01 PROCEDURE — 86922 COMPATIBILITY TEST ANTIGLOB: CPT

## 2022-01-01 PROCEDURE — 93005 ELECTROCARDIOGRAM TRACING: CPT | Performed by: NURSE PRACTITIONER

## 2022-01-01 PROCEDURE — 86870 RBC ANTIBODY IDENTIFICATION: CPT | Performed by: NURSE PRACTITIONER

## 2022-01-01 PROCEDURE — 99284 EMERGENCY DEPT VISIT MOD MDM: CPT

## 2022-01-01 RX ORDER — FAMOTIDINE 10 MG/ML
20 INJECTION, SOLUTION INTRAVENOUS ONCE
Status: DISCONTINUED | OUTPATIENT
Start: 2022-01-01 | End: 2022-01-01 | Stop reason: HOSPADM

## 2022-01-01 RX ORDER — DIPHENHYDRAMINE HYDROCHLORIDE 50 MG/ML
25 INJECTION INTRAMUSCULAR; INTRAVENOUS ONCE
Status: DISCONTINUED | OUTPATIENT
Start: 2022-01-01 | End: 2022-01-01 | Stop reason: HOSPADM

## 2022-01-01 RX ORDER — POTASSIUM CHLORIDE 750 MG/1
20 CAPSULE, EXTENDED RELEASE ORAL ONCE
Status: COMPLETED | OUTPATIENT
Start: 2022-01-01 | End: 2022-01-01

## 2022-01-01 RX ORDER — ACETAMINOPHEN 325 MG/1
650 TABLET ORAL EVERY 6 HOURS PRN
Status: DISCONTINUED | OUTPATIENT
Start: 2022-01-01 | End: 2022-01-01 | Stop reason: HOSPADM

## 2022-01-01 RX ORDER — SODIUM CHLORIDE 0.9 % (FLUSH) 0.9 %
10 SYRINGE (ML) INJECTION AS NEEDED
Status: DISCONTINUED | OUTPATIENT
Start: 2022-01-01 | End: 2022-01-01 | Stop reason: HOSPADM

## 2022-01-01 RX ADMIN — SODIUM CHLORIDE, POTASSIUM CHLORIDE, SODIUM LACTATE AND CALCIUM CHLORIDE 500 ML: 600; 310; 30; 20 INJECTION, SOLUTION INTRAVENOUS at 16:29

## 2022-01-01 RX ADMIN — IOPAMIDOL 100 ML: 612 INJECTION, SOLUTION INTRAVENOUS at 17:12

## 2022-01-01 RX ADMIN — POTASSIUM CHLORIDE 20 MEQ: 10 CAPSULE, COATED, EXTENDED RELEASE ORAL at 18:31

## 2022-01-01 NOTE — ED PROVIDER NOTES
Subjective   Patient is a 66-year-old pleasant female who presents ER today with complaint of rectal bleeding and shortness of breath.  The patient states that she had an episode of rectal bleeding this afternoon when she had a bowel movement.  She states that it was bright red blood with clots.  Patient has a history of a bleeding disorder.  She have persistent proximal nocturnal hem myoglobinuria, history of a transfusion reaction, immune thrombocytopenia.  Patient also has autoimmune hemolytic anemia, and Naveen syndrome.  She is recently started rituximab for 4 doses.  She is getting this weekly.  The patient has weekly appointments for count checks and receives blood transfusions every 2 weeks.  She last had 1 unit of blood on Tuesday and a unit of platelets on Wednesday.  She presents the ER today for further evaluation. Pt has been following with Dr. Galvez at this facility and is scheduled to now see Dr. Medrano; she also follows up with hematology at [place].       History provided by:  Patient   used: No    Rectal Bleeding  Quality:  Bright red  Amount:  Moderate  Duration:  1 day  Timing:  Intermittent  Chronicity:  New  Context: spontaneously    Context: not anal fissures, not anal penetration, not constipation, not defecation, not diarrhea, not foreign body, not hemorrhoids, not rectal injury and not rectal pain    Similar prior episodes: no    Relieved by:  Nothing  Worsened by:  Nothing  Ineffective treatments:  None tried  Associated symptoms: abdominal pain    Associated symptoms: no dizziness, no epistaxis, no fever, no hematemesis, no light-headedness, no loss of consciousness, no recent illness and no vomiting    Risk factors: no anticoagulant use, no hx of colorectal cancer, no hx of colorectal surgery, no hx of IBD, no liver disease, no NSAID use and no steroid use        Review of Systems   Constitutional: Negative for fever.   HENT: Negative for nosebleeds.     Gastrointestinal: Positive for abdominal pain and hematochezia. Negative for hematemesis and vomiting.   Neurological: Negative for dizziness, loss of consciousness and light-headedness.   All other systems reviewed and are negative.      Past Medical History:   Diagnosis Date   • Acid reflux    • Anemia    • PNH (paroxysmal nocturnal hemoglobinuria) (HCC)        No Known Allergies    Past Surgical History:   Procedure Laterality Date   • APPENDECTOMY         Family History   Problem Relation Age of Onset   • Diabetes Mother    • Hypertension Mother    • Heart attack Mother    • Hypertension Father    • Heart attack Father        Social History     Socioeconomic History   • Marital status:    Tobacco Use   • Smoking status: Never Smoker   • Smokeless tobacco: Never Used   Vaping Use   • Vaping Use: Never used   Substance and Sexual Activity   • Alcohol use: Not Currently   • Drug use: Never   • Sexual activity: Yes           Objective   Physical Exam  Vitals and nursing note reviewed.   Constitutional:       Appearance: Normal appearance.   HENT:      Head: Normocephalic and atraumatic.      Mouth/Throat:      Pharynx: Oropharynx is clear.   Eyes:      Conjunctiva/sclera: Conjunctivae normal.   Cardiovascular:      Rate and Rhythm: Normal rate and regular rhythm.   Pulmonary:      Effort: Pulmonary effort is normal.      Breath sounds: Normal breath sounds.   Abdominal:      General: Bowel sounds are normal.      Palpations: Abdomen is soft.   Genitourinary:     Rectum: Guaiac result positive.      Comments: Hemocult stool positive; exam chaperoned by La Mauricio RN  Skin:     General: Skin is dry.      Capillary Refill: Capillary refill takes less than 2 seconds.      Coloration: Skin is pale.      Findings: No bruising.   Neurological:      General: No focal deficit present.      Mental Status: She is alert.   Psychiatric:         Mood and Affect: Mood normal.         Procedures           ED Course  ED  Course as of 01/01/22 1856   Sat Jan 01, 2022   1708 I did discuss the patient's case with Dr. Goldberg.  She is familiar with the patient recommends the patient be transferred to [facility] due to her very complicated medical condition.  I discussed this with the patient and her spouse and they are agreeable to this. [LF]   1720 Pt has had transfusion reaction to platelets in the past. Pt and spouse report that the pt is medicated with tylenol/benadryl and pepcid prior to any tranfusion. This has been ordered. Call placed to [facility] transfer center.  [LF]   1733 Lab called; as the pt has had a previous transfusion reaction, they have to have certain platelets to give her; it will be about 6 hours before they can have this ready as they currently do not have any at this facility.  [LF]   1752 Pt has been accepted to [facility]; accepting physician is Dr. Kristopher Elliott. Pt will be an ED to ED transfer. I have updated Dr. Goldberg. She has advised that the pt is to not go anywhere without assistance and is advised to not hit her head as this could be deadly for her. I have advised the nursing staff, pt and pt spouse of this and they understand this and that they must be very carefull. [LF]   1803 Attempted to find transportation for pt; they are not flying due to weather. [facility] ground transport is backed up by at least 6 hours. Have checked other area EMS crews; unable to transfer. Nursing staff calling [facility]. [LF]   1855 Pt will be transferred at this time in stable cond.  [LF]      ED Course User Index  [LF] Donna Rodriguez, APRN                                         XR Chest 1 View   Final Result   1. Diminished level of inspiration with mild vascular crowding basilar   atelectasis, otherwise no acute process identified.   This report was finalized on 01/01/2022 18:02 by Dr. Elin Rossi MD.      CT Abdomen Pelvis With Contrast   Final Result   1. Under distended distal colon.  Moderate fecal stasis. No free air or   abscess. Absent appendix.   2. Prominent left pelvic venous structures may be incidental or can be   seen with pelvic venous congestion syndrome. Stable 2.5 cm soft tissue   lesion inseparable from the right aspect of the uterus favoring an   exophytic fibroid/leiomyoma. This is similar to the 10/23/2021 exam.   Follow-up outpatient nonemergent ultrasound could be performed for   further assessment.   3. Stable 7 mm hypodense right hepatic lesion too small to definitively   characterize, however incidental cyst or hemangioma favored.   This report was finalized on 01/01/2022 17:28 by Dr. Elin Rossi MD.        Labs Reviewed   COMPREHENSIVE METABOLIC PANEL - Abnormal; Notable for the following components:       Result Value    Glucose 139 (*)     Potassium 3.2 (*)     All other components within normal limits    Narrative:     GFR Normal >60  Chronic Kidney Disease <60  Kidney Failure <15     CBC WITH AUTO DIFFERENTIAL - Abnormal; Notable for the following components:    WBC 2.17 (*)     RBC 2.59 (*)     Hemoglobin 7.7 (*)     Hematocrit 21.2 (*)     MCHC 36.3 (*)     RDW 15.7 (*)     Platelets 3 (*)     Neutrophil % 34.1 (*)     Lymphocyte % 52.5 (*)     Monocyte % 12.9 (*)     Neutrophils, Absolute 0.74 (*)     All other components within normal limits   URINALYSIS W/ CULTURE IF INDICATED - Abnormal; Notable for the following components:    Blood, UA Trace (*)     All other components within normal limits   URINALYSIS, MICROSCOPIC ONLY - Abnormal; Notable for the following components:    RBC, UA 0-2 (*)     All other components within normal limits   POCT OCCULT BLOOD STOOL - Abnormal; Notable for the following components:    Fecal Occult Blood Positive (*)     All other components within normal limits   PROTIME-INR - Normal   APTT - Normal   LIPASE - Normal   COVID PRE-OP / PRE-PROCEDURE SCREENING ORDER (NO ISOLATION)    Narrative:     The following orders were created  for panel order COVID PRE-OP / PRE-PROCEDURE SCREENING ORDER (NO ISOLATION) - Swab, Nasal Cavity.  Procedure                               Abnormality         Status                     ---------                               -----------         ------                     COVID-19,[facility] IN-LALIT...[[id number]]                      In process                   Please view results for these tests on the individual orders.   COVID-19,[facility] IN-HOUSE,NASAL SWAB NO TRANSPORT MEDIA 2 HR TAT   TYPE AND SCREEN   PREPARE PLATELET PHERESIS   PREPARE RBC   CBC AND DIFFERENTIAL    Narrative:     The following orders were created for panel order CBC & Differential.  Procedure                               Abnormality         Status                     ---------                               -----------         ------                     CBC Auto Differential[[id number]]        Abnormal            Final result                 Please view results for these tests on the individual orders.             MDM  Number of Diagnoses or Management Options  Autoimmune hemolytic anemia (HCC): new and requires workup  Naveen's syndrome (HCC): new and requires workup  Gastrointestinal hemorrhage, unspecified gastrointestinal hemorrhage type: new and requires workup  Immune thrombocytopenia (HCC): new and requires workup  PNH (paroxysmal nocturnal hemoglobinuria) (HCC): new and requires workup     Amount and/or Complexity of Data Reviewed  Clinical lab tests: ordered and reviewed  Tests in the radiology section of CPT®: ordered and reviewed  Tests in the medicine section of CPT®: ordered and reviewed  Discuss the patient with other providers: yes    Risk of Complications, Morbidity, and/or Mortality  General comments: Total Critical Care time: 61 mins    Critical Care  Total time providing critical care: 30-74 minutes    Patient Progress  Patient progress: stable      Final diagnoses:   Gastrointestinal hemorrhage, unspecified gastrointestinal  hemorrhage type   Immune thrombocytopenia (HCC)   Naveen's syndrome (HCC)   Autoimmune hemolytic anemia (HCC)   PNH (paroxysmal nocturnal hemoglobinuria) (HCC)       ED Disposition  ED Disposition     ED Disposition Condition Comment    Transfer to Another Facility             No follow-up provider specified.       Medication List      No changes were made to your prescriptions during this visit.          Donna Rodriguez, APRN  01/01/22 1856

## 2022-01-01 NOTE — ED NOTES
Called [facility] at this time but declined to weather. [facility] called at this time for fixed wing. [facility] states called they will call back with answer on transport.      La Zuñiga RN  01/01/22 8022

## 2022-01-01 NOTE — ED NOTES
Patient was educated on the signs and symptoms of a transfusion reaction which may include:    • Hives  • Itching/Rash/Urticarias  • Flushing  • Chills  • Fever (if greater than 1.8 degrees F or 1 degree C from the pre-transfusion baseline temperature And the increased result is a temperature greater than or equal to 100.4 °F)  • Nausea/Vomiting  • Chest/Abdominal/Back Pain  • Pain at the Infusion Site  • Headache  • Muscle Aches/Myalgia  • Dyspnea/Pulmonary Edema/Rales  • Cyanosis  • Coughing/Wheezing  • Shock  • Rigors  • Hypoxemia  • Hypertension  • Hypotension  • Abnormal Bleeding  • Oliguria/Anuria  • Hemoglobinuria  • Tachycardia    Patient to notify staff if any signs/symptoms occur.  Patient verbalizes understanding.     Dioni Beltran, RN  01/01/22 5758

## 2022-01-02 LAB
QT INTERVAL: 356 MS
QTC INTERVAL: 456 MS

## 2022-01-04 ENCOUNTER — TELEPHONE (OUTPATIENT)
Dept: ONCOLOGY | Facility: CLINIC | Age: 67
End: 2022-01-04

## 2022-01-04 NOTE — TELEPHONE ENCOUNTER
Received phone call from Jayce Ratna is in the hospital at [facility] and won't be out by her treatment on 1/6/22. They have asked for me to get the appointment canceled then they we see Dr. Medrano next week and go from there. I have messaged Callie and Alvaro to cancel the appointment for 1/6/22.    rp

## 2022-01-06 ENCOUNTER — APPOINTMENT (OUTPATIENT)
Dept: ONCOLOGY | Facility: HOSPITAL | Age: 67
End: 2022-01-06

## 2022-01-06 ENCOUNTER — APPOINTMENT (OUTPATIENT)
Dept: LAB | Facility: HOSPITAL | Age: 67
End: 2022-01-06

## 2022-01-11 ENCOUNTER — TELEPHONE (OUTPATIENT)
Dept: ONCOLOGY | Facility: CLINIC | Age: 67
End: 2022-01-11

## 2022-01-11 DIAGNOSIS — D69.3 IMMUNE THROMBOCYTOPENIA: Primary | ICD-10-CM

## 2022-01-11 NOTE — TELEPHONE ENCOUNTER
PROMACTA REFILL:    Received call from Asad Pharmacist in [facility]. He calls to report he had received phone call from patient Ratna Cohen  requesting refill of patient's Promacta.100 mg QD.  reports Ratna is still @ [facility] and has not seen Dr Medrano in Consult to date. She is a transfer patient of Dr Galvez. She will establish care with Dr Medrano.    Relayed all information to Dr Medrano, informed patient was currently in hospital in [place], Dr Medrano v/u and will sign prescription for the Promacta w one refill to cover patient RX until she is d/c from hospital and seen in consult here in office with him    Relayed information to Asad, he reports he will place order for the medication and send it to Dr Medrano in-box for Sig. Asad will also inform patient , if [place] decides to make adjustments or increase patient Promacta medication then they will be responsible ([place] [facility]) obtaining PA and ordering the medication.

## 2022-01-13 ENCOUNTER — APPOINTMENT (OUTPATIENT)
Dept: LAB | Facility: HOSPITAL | Age: 67
End: 2022-01-13

## 2022-01-14 ENCOUNTER — SPECIALTY PHARMACY (OUTPATIENT)
Dept: ONCOLOGY | Facility: HOSPITAL | Age: 67
End: 2022-01-14

## 2022-01-14 NOTE — PROGRESS NOTES
[facility]ty Pharmacy Services    Oral Oncology Patient Follow-Up      Consult Details  Consulting Provider:   Amador Medrano MD ([facility] [place] [facility]) - FUTURE   Medication and Regimen:  Promacta 100 mg PO daily     Prescription Review  Dosing & Interactions:   Reviewed, appropriate and no significant interaction noted at this time..   Current Specialty Pharmacy [facility]     Specialty Pharmacy Follow Up Note     Ratna is a 66 y.o. female, who is followed by the specialty pharmacy service for ITP/Palmer Syndrome therapy with Promacta.       Indication, effectiveness, safety and convenience of her specialty medication(s) were reviewed today.      Patient Consultation  History provided by: Rufino Eduardo  History limited by: None  Oral Chemo Agent: Hematology - Promacta  Consultation complete: yes  Drug Interactions Reviewed: yes    Toxicity Assessment  Dermatologic Toxicity: no  Fluid Retention: no  Gastrointestinal Tolerability Issues: no  Neurologic Toxicity: no  Genitourinary Toxicity: no  Other Toxicity: no    Results  Abnormal renal function:no - Estimated Creatinine Clearance: 98.3 mL/min (A) (by C-G formula based on SCr of 0.49 mg/dL (L)).   Abnormal LFTs: no  Abnormal CBC: Reviewed - patient hospitalized    Medications and Health Conditions  Changes in Medication Regimen:YES - patient in hospital at present  Changes in Health Conditions: Workup ongoing in hospital  Required Hospitalization or ED visit in past 30 days: YES - patient hospitalized at [facility].     Discussion with Patient:  Rufino Eduardo called today. Over the past couple of weeks I have been working with them to get refills on Promacta sent to the appropriate pharmacy. Ratna remains hospitalized at [facility] where she has been since around the first of the year. She is taking Promacta while inpatient. The dose of Promacta has not changed. [facility] had to transfer the prescription to [facility], I gave Jayce the phone number to this new place. They can either send the medication to their home or to the (across from) [facility] where they picked up last time. The pharmacy was having issues with insurance adjudication so I provided information to Jayce to pass along to the pharmacy. No other questions, needs, or concerns at this time.     We will continue to follow.        Electronically signed 01/14/22 at 17:37 CST by:  Asad Greco, PharmD  Specialty Pharmacist - Hematology & Oncology  [facility]  [phone]

## 2022-01-17 ENCOUNTER — APPOINTMENT (OUTPATIENT)
Dept: ONCOLOGY | Facility: HOSPITAL | Age: 67
End: 2022-01-17

## 2022-01-17 ENCOUNTER — APPOINTMENT (OUTPATIENT)
Dept: LAB | Facility: HOSPITAL | Age: 67
End: 2022-01-17

## 2022-01-31 ENCOUNTER — TELEPHONE (OUTPATIENT)
Dept: ONCOLOGY | Facility: CLINIC | Age: 67
End: 2022-01-31

## 2022-01-31 ENCOUNTER — LAB (OUTPATIENT)
Dept: LAB | Facility: HOSPITAL | Age: 67
End: 2022-01-31

## 2022-01-31 DIAGNOSIS — D69.41 EVAN'S SYNDROME: Primary | ICD-10-CM

## 2022-01-31 DIAGNOSIS — D69.41 EVAN'S SYNDROME: ICD-10-CM

## 2022-01-31 LAB
BASOPHILS # BLD AUTO: 0 10*3/MM3 (ref 0–0.2)
BASOPHILS NFR BLD AUTO: 0 % (ref 0–1.5)
DEPRECATED RDW RBC AUTO: 39 FL (ref 37–54)
EOSINOPHIL # BLD AUTO: 0 10*3/MM3 (ref 0–0.4)
EOSINOPHIL NFR BLD AUTO: 0 % (ref 0.3–6.2)
ERYTHROCYTE [DISTWIDTH] IN BLOOD BY AUTOMATED COUNT: 13.5 % (ref 12.3–15.4)
HCT VFR BLD AUTO: 29.6 % (ref 34–46.6)
HGB BLD-MCNC: 10.3 G/DL (ref 12–15.9)
LYMPHOCYTES # BLD AUTO: 0.97 10*3/MM3 (ref 0.7–3.1)
LYMPHOCYTES NFR BLD AUTO: 28.5 % (ref 19.6–45.3)
MCH RBC QN AUTO: 28.4 PG (ref 26.6–33)
MCHC RBC AUTO-ENTMCNC: 34.8 G/DL (ref 31.5–35.7)
MCV RBC AUTO: 81.5 FL (ref 79–97)
MONOCYTES # BLD AUTO: 0.16 10*3/MM3 (ref 0.1–0.9)
MONOCYTES NFR BLD AUTO: 4.7 % (ref 5–12)
NEUTROPHILS NFR BLD AUTO: 2.25 10*3/MM3 (ref 1.7–7)
NEUTROPHILS NFR BLD AUTO: 66.2 % (ref 42.7–76)
PLATELET # BLD AUTO: 8 10*3/MM3 (ref 140–450)
RBC # BLD AUTO: 3.63 10*6/MM3 (ref 3.77–5.28)
WBC NRBC COR # BLD: 3.4 10*3/MM3 (ref 3.4–10.8)

## 2022-01-31 PROCEDURE — 85025 COMPLETE CBC W/AUTO DIFF WBC: CPT

## 2022-01-31 PROCEDURE — 36415 COLL VENOUS BLD VENIPUNCTURE: CPT

## 2022-01-31 NOTE — TELEPHONE ENCOUNTER
Called patient and her  to let her know that her platelet is an 8 and she would need to have platelets. We don't have them available for her at this time so per Dr. Medrano I have put a message into [facility] with Dr. Flaco Blanchard with her lab results and they state they will call the patient and let her know what she needs to do. Patient verbalized understanding.    rp

## 2022-02-01 ENCOUNTER — TELEPHONE (OUTPATIENT)
Dept: ONCOLOGY | Facility: CLINIC | Age: 67
End: 2022-02-01

## 2022-02-01 NOTE — PROGRESS NOTES
[facility] ONC [facility] HEMATOLOGY & ONCOLOGY  [address]  [place] [address] [phone]    Patient Name: Ratna Cohen  Encounter Date: 02/07/2022  YOB: 1955  Patient Number: [id number]      REASON FOR FOLLOW-UP: Ratna Cohen is a pleasant 66 y.o.  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  She was treated with rituximab 12/16/2021, 12/23/2021, 12/30/2021 and 01/07/2022 and had Decadron 40 mg daily completed 02/02/2022.  She is on Promacta 150 mg daily from 12/22/2021 through present. >She was started on cyclosporine 175 mg twice daily from 01/11/2022.   She is seen with spouse Jayce.  She is a reliable historian.    She had required 1 unit of packed RBC transfusion 12/29/2021.  She had platelet transfusion 02/01/2022 at [place].        Problem List Items Addressed This Visit     None            PAST MEDICAL HISTORY:  ALLERGIES:  No Known Allergies  CURRENT MEDICATIONS:  Outpatient Encounter Medications as of 2/7/2022   Medication Sig Dispense Refill   • amLODIPine (NORVASC) 5 MG tablet Take 5 mg by mouth Daily.     • cycloSPORINE (sandIMMUNE) 25 MG capsule Take 25 mg by mouth 2 (Two) Times a Day.     • cycloSPORINE modified (GENGRAF) 50 MG capsule Take  by mouth 2 (Two) Times a Day.     • eltrombopag (Promacta) 50 MG tablet Take 2 tablets by mouth Daily. Administer on an empty stomach, 1 hour before or 2 hours after a meal. 60 tablet 2   • fluconazole (DIFLUCAN) 200 MG tablet Take 200 mg by mouth Daily.     • folic acid (FOLVITE) 1 MG tablet Take 1 tablet by mouth Daily. 90 tablet 3   • loratadine (CLARITIN) 10 MG tablet Take 10 mg by mouth Daily.     • pantoprazole (PROTONIX) 20 MG EC tablet Take 20 mg by mouth 2 (Two) Times a Day.     • valACYclovir (VALTREX) 1000 MG tablet Take 1 tablet by mouth Daily. Take one tab daily for prevention of shingles. 30 tablet 3     No  facility-administered encounter medications on file as of 2/7/2022.     ADULT ILLNESSES:  Patient Active Problem List   Diagnosis Code   • Need for meningococcal vaccination Z23   • PNH (paroxysmal nocturnal hemoglobinuria) ([facility]) D59.5   • HENSLEY (dyspnea on exertion) R06.00   • Transfusion reaction T80.92XA   • Immune thrombocytopenia ([facility]) D69.3   • AIHA (autoimmune hemolytic anemia) ([facility]) D59.10   • Naveen's syndrome ([facility]) D69.41     SURGERIES:  Past Surgical History:   Procedure Laterality Date   • APPENDECTOMY       HEALTH MAINTENANCE ITEMS:  Health Maintenance Due   Topic Date Due   • ANNUAL PHYSICAL  Never done   • TDAP/TD VACCINES (1 - Tdap) Never done   • ZOSTER VACCINE (1 of 2) Never done   • COVID-19 Vaccine (3 - Booster for Moderna series) 08/31/2021   • HEPATITIS C SCREENING  Never done       <no information>  Last Completed Colonoscopy          COLORECTAL CANCER SCREENING (FECAL OCCULT BLOOD TEST - Yearly) Next due on 1/1/2023 01/01/2022  Fecal Occult Blood component of POC Occult Blood Stool              Immunization History   Administered Date(s) Administered   • COVID-19 (MODERNA) 1st, 2nd, 3rd Dose Only 03/01/2021, 03/31/2021   • Hepatitis B Vaccine Adult IM 05/10/2007   • Meningococcal B,(Bexsero) 10/22/2021, 11/23/2021   • Meningococcal MCV4P (Menactra) 10/22/2021, 12/21/2021     Last Completed Mammogram          MAMMOGRAM (Every 2 Years) Next due on 9/1/2023 09/01/2021  Outside Procedure: HC MAMMOGRAM SCREENING BILAT DIGITAL W CAD                  FAMILY HISTORY:  Family History   Problem Relation Age of Onset   • Diabetes Mother    • Hypertension Mother    • Heart attack Mother    • Hypertension Father    • Heart attack Father      SOCIAL HISTORY:  Social History     Socioeconomic History   • Marital status:    Tobacco Use   • Smoking status: Never Smoker   • Smokeless tobacco: Never Used   Vaping Use   • Vaping Use: Never used   Substance and Sexual Activity   • Alcohol use: Not  "Currently   • Drug use: Never   • Sexual activity: Yes       REVIEW OF SYSTEMS:    Review of Systems   Constitutional: Positive for fatigue. Negative for chills and fever.   HENT: Negative for congestion, nosebleeds and trouble swallowing.    Eyes: Negative for redness and visual disturbance.   Respiratory: Negative for cough and shortness of breath.    Cardiovascular: Negative for chest pain and palpitations.   Gastrointestinal: Negative for abdominal pain, constipation, diarrhea, nausea and vomiting.   Endocrine: Negative for polydipsia and polyphagia.   Genitourinary: Negative for difficulty urinating and dysuria.   Musculoskeletal: Negative for gait problem.   Skin: Positive for pallor.   Allergic/Immunologic: Negative for food allergies.   Neurological: Negative for seizures, weakness and confusion.   Hematological: Does not bruise/bleed easily.   Psychiatric/Behavioral: Negative for agitation, hallucinations and depressed mood.         VITAL SIGNS: /70   Pulse 107   Temp 97.6 °F (36.4 °C)   Resp 18   Ht 154.9 cm (61\")   Wt 62.4 kg (137 lb 8 oz)   SpO2 93%   Breastfeeding No   BMI 25.98 kg/m²  Body surface area is 1.61 meters squared.   Pain Score    02/07/22 0914   PainSc: 0-No pain           PHYSICAL EXAMINATION:     Physical Exam  Vitals reviewed.   HENT:      Head: Normocephalic and atraumatic.   Eyes:      General: No scleral icterus.  Cardiovascular:      Rate and Rhythm: Normal rate.   Pulmonary:      Effort: No respiratory distress.      Breath sounds: No wheezing or rales.   Abdominal:      General: Bowel sounds are normal.      Palpations: Abdomen is soft.      Tenderness: There is no abdominal tenderness.   Musculoskeletal:         General: No swelling.      Cervical back: Neck supple.   Skin:     General: Skin is warm.      Coloration: Skin is pale.   Neurological:      Mental Status: She is alert and oriented to person, place, and time.   Psychiatric:         Mood and Affect: Mood " normal.         Behavior: Behavior normal.         Thought Content: Thought content normal.         Judgment: Judgment normal.         LABS    Lab Results - Last 18 Months   Lab Units 01/31/22  1513 01/24/22  0054 01/23/22  0013 01/22/22  0144 01/21/22  0033 01/20/22  0000 01/19/22  0016 01/19/22  0016 01/11/22  0029 01/11/22  0029 01/09/22  0100 01/08/22  0033 01/01/22  2204 01/01/22  1623 12/30/21  0813 12/30/21  0813 12/29/21  1030 12/28/21  0914 12/28/21  0914 12/23/21  0810 12/22/21  1313 12/14/21  0811 12/08/21  0814 12/06/21  0951 12/02/21  1027 12/02/21  1027 11/29/21  0940 11/29/21  0940 11/24/21  0935 11/24/21  0935 11/22/21  1112 11/22/21  1112 11/15/21  0910 11/12/21  0918 11/09/21  1041 11/08/21  0915 11/05/21  0914 11/01/21  0806 10/23/21  1012 10/21/21  0800 10/18/21  1018 10/15/21  1015 10/15/21  1015 09/27/21  0942   HEMOGLOBIN g/dL 10.3*  --   --   --   --   --   --   --   --   --   --   --   --  7.7*  --  7.8* 8.1*  --  6.2* 7.5*  --  6.3*  --  7.8*   < > 6.7*   < > 7.3*  --  7.9*   < > 7.7*   < > 6.9*   < > 7.3*   < > 7.4*   < > 9.3* 8.5*   < > 7.6*   < >   HEMATOCRIT % 29.6*  --   --   --   --   --   --   --   --   --   --   --   --  21.2*  --  23.0* 22.2*  --  18.7* 22.4*  --  18.7*  --  23.0*   < > 20.4*   < > 21.6*  --  23.4*   < > 22.8*   < > 20.7*   < > 20.9*   < > 21.2*   < > 26.4* 25.0*   < > 22.4*   < >   MCV fL 81.5  --   --   --   --   --   --   --   --   --   --   --   --  81.9  --  85.5  --   --  85.0 86.2  --  89.0  --  88.5   < > 88.7   < > 87.4  --  87.6   < > 88.4   < > 96.3   < > 95.9   < > 100.0*   < > 98.1* 97.3*   < > 102.3*   < >   WBC 10*3/mm3 3.40  --   --   --   --   --   --   --   --   --   --   --   --  2.17*  --  2.44*  --   --  2.23* 2.40*  --  2.72*  --  2.40*   < > 2.76*   < > 2.22*  --  3.09*   < > 2.94*   < > 3.45   < > 2.74*   < > 4.36   < > 8.71 6.72   < > 3.71   < >   RDW % 13.5  --   --   --   --   --   --   --   --   --   --   --   --  15.7*  --  16.1*  --    --  16.8* 17.4*  --  18.4*  --  18.2*   < > 19.4*   < > 19.1*  --  19.2*   < > 19.1*   < > 20.6*   < > 20.1*   < > 21.2*   < > 21.2* 21.0*   < > 21.2*   < >   MPV fL  --   --   --   --   --   --   --   --   --   --   --   --   --   --   --   --   --   --   --  7.8  --   --   --   --   --   --   --   --   --   --   --   --   --  11.1  --  9.5  --   --   --  10.0 11.2  --  11.5  --    PLATELETS 10*3/mm3 8*  --   --   --   --   --   --   --   --   --   --   --   --  3*  --  7*  --   --  5* 57*  --  3*  --  3*   < > 3*   < > 2*  --  2*   < > 2*   < > 8*   < > 6*   < > 10*   < > 13* 13*   < > 11*   < >   IMM GRAN % %  --   --   --   --   --   --   --   --   --   --   --   --   --   --   --   --   --   --   --   --   --   --   --   --   --   --   --   --   --   --   --   --   --  0.3  --  0.4  --  0.2  --   --   --   --  0.3  --    NEUTROS ABS 10*3/mm3 2.25 0.94* 0.53* 0.36* 0.52* 0.59*   < > 0.72*   < > 1.21*  --   --   --  0.74*   < > 0.89*  --    < > 0.84* 0.91*   < > 1.99   < > 0.53*   < > 0.74*   < > 0.68*   < > 1.18*  --  0.45*   < > 1.29*   < > 1.10*   < > 1.79   < > 2.40 3.13   < > 1.33*   < >   LYMPHS ABS 10*3/mm3 0.97 0.45* 0.27* 0.07* 0.07*  --   --  0.06*   < > 1.12  --   --   --  1.14   < > 1.27  --    < > 1.15  --    < >  --    < >  --   --   --   --   --    < > 1.59  --   --    < > 1.77  --  1.32   < > 2.14   < >  --  3.01   < > 1.96  --    MONOS ABS 10*3/mm3 0.16 0.09* 0.12* 0.07* 0.07*  --   --  0.03*   < > 0.13*  --   --   --  0.28   < > 0.26  --    < > 0.23  --    < >  --    < >  --   --   --   --   --    < > 0.30  --   --    < > 0.34  --  0.27   < > 0.36   < >  --  0.53   < > 0.37  --    EOS ABS 10*3/mm3 0.00  --   --   --   --   --   --   --   --  0.04  --   --   --  0.01  --  0.01  --   --  0.00  --    < > 0.11   < >  --   --   --    < > 0.02   < > 0.01  --   --    < > 0.03   < > 0.04   < > 0.05   < >  --  0.00   < > 0.03  --    EOSINOPHIL ABS %  --   --   --   --   --   --   --   --   --  1.7  --    --   --   --   --   --   --   --   --   --    < >  --    < >  --   --   --   --   --   --   --   --   --   --   --   --   --   --   --   --   --   --   --   --   --    BASOS ABS 10*3/mm3 0.00  --   --   --   --   --   --   --   --   --   --   --   --  0.00  --  0.00  --   --  0.00  --   --   --   --   --   --   --   --  0.02  --  0.00  --   --    < > 0.01  --  0.00   < > 0.01   < >  --  0.01   < > 0.01  --    IMMATURE GRANS (ABS) 10*3/mm3  --   --   --   --   --   --   --   --   --   --   --   --   --   --   --   --   --   --   --   --   --   --   --   --   --   --   --   --   --   --   --   --   --  0.01  --  0.01  --  0.01  --   --   --   --  0.01  --    NRBC /100 WBC  --   --   --   --   --   --   --   --   --   --   --   --   --   --   --   --   --   --   --   --   --   --   --   --   --   --   --   --   --   --   --   --   --  0.0  --  0.0  --  0.0  --   --   --   --  0.5*  --    NEUTROPHIL % %  --   --   --   --   --   --   --   --   --   --   --   --   --   --   --   --   --   --   --  38.0*  --  73.0  --  22.0*  --  25.8*  --  29.6*  --   --   --  15.2*  --   --    < >  --   --   --   --  24.5*  --   --   --    < >   MONOCYTES % %  --  6.2 12.8 14.0 11.1  --   --  3.4  --  5.1  --   --   --   --   --   --   --   --   --  8.0   < > 3.0*   < > 9.0   < > 6.2   < > 2.0*  --   --   --  10.1  --   --    < >  --   --   --   --  4.1*  --   --   --    < >   BASOPHIL % %  --   --   --   --   --   --   --   --   --   --   --   --   --   --   --   --   --   --   --   --   --   --   --   --   --   --   --  1.0  --   --   --   --   --   --   --   --   --   --   --   --   --   --   --   --    ATYP LYMPH % %  --   --   --   --   --   --   --   --   --   --   --   --   --   --   --   --   --   --   --  6.0*  --  7.0*  --  4.0  --  1.0  --   --   --   --   --  7.1*  --   --   --   --   --   --   --   --   --   --   --   --    ANISOCYTOSIS   --  1+ 1+ 1+  --  1+  --   --   --   --  1+ 1+   < >  --   --   --   --   --   --   Slight/1+   < >  --    < > Slight/1+   < > Slight/1+   < > Slight/1+  --   --   --  Slight/1+  --   --    < >  --   --   --    < > Slight/1+  --   --   --    < >    < > = values in this interval not displayed.       Lab Results - Last 18 Months   Lab Units 01/29/22  0134 01/28/22  0117 01/26/22  0018 01/24/22  0054 01/22/22  0144 01/21/22  0033 01/12/22  0107 01/11/22  1817 01/01/22  2204 01/01/22  1623 11/29/21  0940 11/29/21  0940 11/22/21  1112 11/22/21  1112 11/18/21  0231 11/15/21  0910 11/12/21  0918 11/12/21  0918 11/09/21  1041 11/09/21  1041   GLUCOSE mg/dL  --   --   --   --   --   --   --   --   --  139*  --  164*  --  119*  --  130*  --  128*  --  101*   SODIUM mmol/L 138 134* 137 136 142 138   < >  --    < > 141   < > 135*   < > 136   < > 137   < > 137   < > 137   POTASSIUM mmol/L 4.4 4.2 4.6 4.1 3.9 4.0   < >  --    < > 3.2*   < > 3.6   < > 3.6   < > 3.7   < > 3.5   < > 3.8   TOTAL CO2 mmol/L 27 23 26 22* 27 24   < >  --    < >  --   --   --   --   --    < >  --   --   --   --   --    CO2 mmol/L  --   --   --   --   --   --   --   --   --  25.0   < > 24.0   < > 25.0  --  24.0   < > 23.0   < > 26.0   CHLORIDE mmol/L 102 100 102 100 110* 108*   < >  --    < > 103   < > 101   < > 101   < > 102   < > 101   < > 102   ANION GAP  9 11 9 14 5 6   < >  --    < > 13.0   < > 10.0   < > 10.0   < > 11.0   < > 13.0   < > 9.0   CREATININE mg/dL 0.50* 0.59 0.54* 0.55* 0.48* 0.49*   < >  --    < > 0.67   < > 0.51*   < > 0.57   < > 0.50*   < > 0.56*   < > 0.46*   BUN mg/dL 13 18 13 13 10 10   < >  --    < > 8   < > 10   < > 13   < > 10   < > 9   < > 7*   BUN / CREAT RATIO   --   --   --   --   --   --   --   --   --  11.9  --  19.6  --  22.8  --  20.0  --  16.1  --  15.2   CALCIUM mg/dL 9.3 9.2 9.2 9.1 8.4 8.5   < >  --    < > 9.0   < > 9.4   < > 9.0   < > 8.9   < > 8.9   < > 9.2   EGFR IF NONAFRICN AM mL/min/1.73  --   --   --   --   --   --   --   --   --  89  --  121  --  107  --  124  --  109  --  137   ALK PHOS U/L   --   --   --   --   --   --   --   --   --  75  --  59  --  57  --  68  --  73  --  75   TOTAL PROTEIN gm/dL  --   --   --   --   --   --   --  7.2  --  7.6  --  8.4  --  9.2*  --  7.0  --  7.3   < > 7.5   ALT (SGPT) U/L  --   --   --   --   --   --   --   --   --  29  --  31  --  25  --  25  --  29  --  35*   AST (SGOT) U/L  --   --   --   --   --   --   --   --   --  17  --  24  --  20  --  20  --  22  --  24   BILIRUBIN mg/dL  --   --   --   --   --   --   --   --   --  0.4  --  0.5  --  0.8  --  0.6  --  0.7  --  0.7   ALBUMIN g/dL  --   --   --   --   --   --   --   --   --  4.20  --  4.10  --  4.00  --  4.40  --  4.50  --  4.60   GLOBULIN gm/dL  --   --   --   --   --   --   --   --   --  3.4  --  4.3  --  5.2  --  2.6  --  2.8  --  2.9    < > = values in this interval not displayed.       Lab Results - Last 18 Months   Lab Units 01/11/22  1817 01/11/22  0029 01/02/22  0246 12/22/21  1313 12/08/21  0814 12/06/21  0951 11/29/21  0940   FREE LAMBDA LIGHT CHAINS mg/dL 1.35  --   --   --   --   --   --    LDH unit/L  --  208 278* 197 219 223* 206       Lab Results - Last 18 Months   Lab Units 01/19/22  0016 01/18/22  1235 01/09/22  1145 01/04/22  0523 11/29/21  0940 10/18/21  1018 10/13/21  1151 10/13/21  1008 10/01/21  1116 09/27/21  0942   IRON mcg/dL  --   --   --   --   --  230*  --   --  163 141   TIBC mcg/dL  --   --   --   --   --  282*  --   --  222* 280*   IRON SATURATION %  --   --   --   --   --  82*  --   --  73 50   FERRITIN ng/mL 2,559* 3,040*  --  2,564*  --  872.00*  --   --  418* 472.40*   TSH mcunit/mL  --   --   --   --   --   --  1.291  --   --   --    FOLATE ng/mL  --   --  12.6  --  >20.00 17.10  --  16.6  --  >20.00         Ratna Cohen reports a pain score of 0.        ASSESSMENT:  1.  Aplastic anemia.   Treatment status.On cyclosporine.  2.  Palmer syndrome.  Treatment status: Rituxan weekly x 4 doses completed 01/07/2022 at [place] and [place] [address].  3.  Performance status of 1.  4.   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  5.  Immune mediated thrombocytopenia.  Treatment status: Unresponsive to glucocorticoid x2 courses, IVIG, Nplate and low-dose Promacta 50 mg.  On Promacta 150 mg from 12/22/2021 through present.        PLAN:  1.   Re: The reason for follow-up.  WBC 3.2, hemoglobin 8.5, hematocrit 25, MCV 86, platelets 6 and ANC 0.92 on 12/22/2021.  Total bilirubin was 0.6 alkaline phosphatase 63, AST 13 and ALT 20.   at [facility].  She was admitted 01/01/2022 at [facility] for bright red blood per rectum.  Her WBC was 2.1, platelet 3, hemoglobin 7.7 and positive for fecal occult blood.  CT abdomen pelvis showed no acute GI abnormality though possible uterine fibroid is noted.  She was transferred to [facility].  She was given 40 mg IV Decadron followed by 1 g/kg of IVIG and 4 days of dexamethasone 01/02/2022 through 01/05/2022.  Bone marrow 01/04/2022 showed atypical cells and recommended correlation with follow-up labs for final diagnosis.  For dose of Rituxan given 01/07/2022.  Continue Promacta 100 mg daily.  She is on cyclosporine 125 mg twice a day from 01/11/2022. Follow-up at [facility] 02/09/2022.  Patient high risk for complications due to immunosuppression and cytopenias  2.   Re: Heme status. Pending.  3.   Re: CMP. Pending.  4.   Re: LDH. Pending.   5.  Transfuse 1 unit Rh negative platelet if platelet count is below 10 at [place].  Premedicate with Tylenol 500 mg p.o., Benadryl 25 mg IV and Pepcid 20 mg IV.  Observe for transfusion reactions.  6.  Transfuse 1 unit Rh negative packed RBC if hemoglobin less than 7 at [facility].  Premed Tylenol 500 mg p.o., Benadryl 25 mg IV and Pepcid 20 mg IV.  Lasix 20 mg IV push after packed RBC transfusion. Observe for transfusion reactions.  7.  eRx Promacta 50 mg pills, take 3 pills daily #30 with 2 refills per [facility]  Observe for potential hepatotoxicity and thromboembolism.  8.  eRx  folic acid 1 mg p.o. daily #60 with 2 refills if needed.  9.  eRx levofloxacin 500 mg p.o. daily if ANC below 500 as prophylaxis #30 with 1 refill if needed.  10. eRx Valtrex 1000 mg p.o. daily #30 with 1 refill if needed.  11.  Keep appointment at [facility] 02/09/2022.   12.  Plan of care discussed with patient and spouse  Understanding expressed.  Patient able to proceed.  13.  Continue care per primary care physician and other specialist.  14.  No return visit. Patient will continue care per Dr. Blanchard from [facility].   15.  Fax note to Dr. Blanchard.      I have reviewed the assessment and plan and verified the accuracy of it. No changes to assessment and plan since the information was documented. Amador Medrano MD 02/07/22       I spent 37 total minutes, face-to-face, caring for Ratna blanco.  Greater than 50% of this time involved counseling and/or coordination of care as documented within this note regarding the patient's illness(es), pros and cons of various treatment options, instructions and/or risk reduction.      Flaco Blanchard MD [facility]  DEVIN [facility]

## 2022-02-01 NOTE — TELEPHONE ENCOUNTER
Caller: Ratna Cohen    Relationship to patient: Self    Best call back number: [phone]    Chief complaint: R/S APPT    Type of visit: LAB AND FOLLOW UP    Requested date: 2-2 NEEDS AFTER LUNCH     If rescheduling, when is the original appointment: 2-2     Additional notes:PATIENT HAD TO GO TO [place] TO GET PLATELETS AND WILL NOT BE BACK IN TIME, SHE IS ASKING IF SHE CAN KEEP THAT DAY JUST A LATER TIME.    PLEASE ADVISE

## 2022-02-02 ENCOUNTER — APPOINTMENT (OUTPATIENT)
Dept: LAB | Facility: HOSPITAL | Age: 67
End: 2022-02-02

## 2022-02-04 ENCOUNTER — APPOINTMENT (OUTPATIENT)
Dept: LAB | Facility: HOSPITAL | Age: 67
End: 2022-02-04

## 2022-02-07 ENCOUNTER — TELEPHONE (OUTPATIENT)
Dept: ONCOLOGY | Facility: CLINIC | Age: 67
End: 2022-02-07

## 2022-02-07 ENCOUNTER — OFFICE VISIT (OUTPATIENT)
Dept: ONCOLOGY | Facility: CLINIC | Age: 67
End: 2022-02-07

## 2022-02-07 ENCOUNTER — LAB (OUTPATIENT)
Dept: LAB | Facility: HOSPITAL | Age: 67
End: 2022-02-07

## 2022-02-07 VITALS
RESPIRATION RATE: 18 BRPM | DIASTOLIC BLOOD PRESSURE: 70 MMHG | OXYGEN SATURATION: 93 % | HEART RATE: 107 BPM | WEIGHT: 137.5 LBS | BODY MASS INDEX: 25.96 KG/M2 | HEIGHT: 61 IN | SYSTOLIC BLOOD PRESSURE: 148 MMHG | TEMPERATURE: 97.6 F

## 2022-02-07 DIAGNOSIS — D61.9 APLASTIC ANEMIA: Primary | ICD-10-CM

## 2022-02-07 DIAGNOSIS — D69.41 EVAN'S SYNDROME: Primary | ICD-10-CM

## 2022-02-07 LAB
ANISOCYTOSIS BLD QL: ABNORMAL
DEPRECATED RDW RBC AUTO: 39.8 FL (ref 37–54)
EOSINOPHIL # BLD MANUAL: 0.02 10*3/MM3 (ref 0–0.4)
EOSINOPHIL NFR BLD MANUAL: 1 % (ref 0.3–6.2)
ERYTHROCYTE [DISTWIDTH] IN BLOOD BY AUTOMATED COUNT: 13.3 % (ref 12.3–15.4)
HCT VFR BLD AUTO: 24.3 % (ref 34–46.6)
HGB BLD-MCNC: 8.4 G/DL (ref 12–15.9)
HOLD SPECIMEN: NORMAL
LYMPHOCYTES # BLD MANUAL: 1.09 10*3/MM3 (ref 0.7–3.1)
LYMPHOCYTES NFR BLD MANUAL: 9.4 % (ref 5–12)
MCH RBC QN AUTO: 28.8 PG (ref 26.6–33)
MCHC RBC AUTO-ENTMCNC: 34.6 G/DL (ref 31.5–35.7)
MCV RBC AUTO: 83.2 FL (ref 79–97)
MONOCYTES # BLD: 0.18 10*3/MM3 (ref 0.1–0.9)
NEUTROPHILS # BLD AUTO: 0.61 10*3/MM3 (ref 1.7–7)
NEUTROPHILS NFR BLD MANUAL: 32.3 % (ref 42.7–76)
PLATELET # BLD AUTO: 4 10*3/MM3 (ref 140–450)
PMV BLD AUTO: ABNORMAL FL
POIKILOCYTOSIS BLD QL SMEAR: ABNORMAL
POLYCHROMASIA BLD QL SMEAR: ABNORMAL
RBC # BLD AUTO: 2.92 10*6/MM3 (ref 3.77–5.28)
SMALL PLATELETS BLD QL SMEAR: ABNORMAL
STOMATOCYTES BLD QL SMEAR: ABNORMAL
TARGETS BLD QL SMEAR: ABNORMAL
VARIANT LYMPHS NFR BLD MANUAL: 1 % (ref 0–5)
VARIANT LYMPHS NFR BLD MANUAL: 56.3 % (ref 19.6–45.3)
WBC MORPH BLD: NORMAL
WBC NRBC COR # BLD: 1.9 10*3/MM3 (ref 3.4–10.8)

## 2022-02-07 PROCEDURE — 99214 OFFICE O/P EST MOD 30 MIN: CPT | Performed by: INTERNAL MEDICINE

## 2022-02-07 PROCEDURE — 85007 BL SMEAR W/DIFF WBC COUNT: CPT

## 2022-02-07 PROCEDURE — 36415 COLL VENOUS BLD VENIPUNCTURE: CPT

## 2022-02-07 PROCEDURE — 85025 COMPLETE CBC W/AUTO DIFF WBC: CPT

## 2022-02-07 RX ORDER — CYCLOSPORINE 25 MG/1
25 CAPSULE, GELATIN COATED ORAL 2 TIMES DAILY
Status: ON HOLD | COMMUNITY
End: 2022-02-12

## 2022-02-07 RX ORDER — AMLODIPINE BESYLATE 5 MG/1
5 TABLET ORAL DAILY
COMMUNITY
End: 2022-02-12 | Stop reason: HOSPADM

## 2022-02-07 RX ORDER — FLUCONAZOLE 200 MG/1
400 TABLET ORAL DAILY
COMMUNITY

## 2022-02-07 NOTE — TELEPHONE ENCOUNTER
Per Dr Medrano instructions, contacted patient and . Relayed information regarding today's CRITICAL LAB VALUES'    WBC: 1.90  PLT: 4  They were informed due to Critical lab values she would need to contact Dr Juliana Blanchard so arrangements can be made for transfusion/  Patient blood products have to be CMV Negative and Irradiated, Leukopoor products.      relays patient has apt's [facility]enia for f/u tomorrow 2/5/22.  was informed that patient would need to be taken today due to Critical PLT Count of 4, she could possibly have a Critical bleeding event.  v/u and will contact Dr Blanchard to kenia arrangements.    Copy of today's labs faxed: Dr Juliana Blanchard to address.   [phone]      NOTE:  Patient care had been a collaborate case, she was being seen her locally due to location and easier assesability for patient and family. Her care is being turned over completely to [facility]ician Dr Jasmyn Blanchard, Dr Medrano explained there will no longer be a Hematologist here at the Center to manage her care and due to her complicated case it was best that Dr Blanchard manage. Patient and  v/u

## 2022-02-07 NOTE — TELEPHONE ENCOUNTER
CRITICAL LAB VALUE:  Received call from [facility]  Hematology with CRITICAL LAB VALUE    WBC: 1.90  PLT:  4  This information was sent to Dr Medrano for review and address.

## 2022-02-11 ENCOUNTER — HOSPITAL ENCOUNTER (OUTPATIENT)
Facility: HOSPITAL | Age: 67
Setting detail: OBSERVATION
Discharge: HOME | End: 2022-02-12
Attending: INTERNAL MEDICINE | Admitting: INTERNAL MEDICINE

## 2022-02-11 DIAGNOSIS — D61.9 APLASTIC ANEMIA: ICD-10-CM

## 2022-02-11 DIAGNOSIS — D69.6 THROMBOCYTOPENIA: Primary | ICD-10-CM

## 2022-02-11 LAB
ALBUMIN SERPL-MCNC: 4.1 G/DL (ref 3.5–5.2)
ALBUMIN/GLOB SERPL: 1.2 G/DL
ALP SERPL-CCNC: 74 U/L (ref 39–117)
ALT SERPL W P-5'-P-CCNC: 18 U/L (ref 1–33)
ANION GAP SERPL CALCULATED.3IONS-SCNC: 14 MMOL/L (ref 5–15)
ANISOCYTOSIS BLD QL: ABNORMAL
AST SERPL-CCNC: 12 U/L (ref 1–32)
BILIRUB SERPL-MCNC: 0.9 MG/DL (ref 0–1.2)
BUN SERPL-MCNC: 8 MG/DL (ref 8–23)
BUN/CREAT SERPL: 14.8 (ref 7–25)
CALCIUM SPEC-SCNC: 8.8 MG/DL (ref 8.6–10.5)
CHLORIDE SERPL-SCNC: 98 MMOL/L (ref 98–107)
CO2 SERPL-SCNC: 23 MMOL/L (ref 22–29)
CREAT SERPL-MCNC: 0.54 MG/DL (ref 0.57–1)
DACRYOCYTES BLD QL SMEAR: ABNORMAL
DEPRECATED RDW RBC AUTO: 40.3 FL (ref 37–54)
ERYTHROCYTE [DISTWIDTH] IN BLOOD BY AUTOMATED COUNT: 13.6 % (ref 12.3–15.4)
GFR SERPL CREATININE-BSD FRML MDRD: 114 ML/MIN/1.73
GLOBULIN UR ELPH-MCNC: 3.5 GM/DL
GLUCOSE SERPL-MCNC: 140 MG/DL (ref 65–99)
HCT VFR BLD AUTO: 28 % (ref 34–46.6)
HGB BLD-MCNC: 9.9 G/DL (ref 12–15.9)
LYMPHOCYTES # BLD MANUAL: 1.67 10*3/MM3 (ref 0.7–3.1)
LYMPHOCYTES NFR BLD MANUAL: 8.1 % (ref 5–12)
MCH RBC QN AUTO: 29.3 PG (ref 26.6–33)
MCHC RBC AUTO-ENTMCNC: 35.4 G/DL (ref 31.5–35.7)
MCV RBC AUTO: 82.8 FL (ref 79–97)
MONOCYTES # BLD: 0.17 10*3/MM3 (ref 0.1–0.9)
NEUTROPHILS # BLD AUTO: 0.25 10*3/MM3 (ref 1.7–7)
NEUTROPHILS NFR BLD MANUAL: 8.1 % (ref 42.7–76)
NEUTS BAND NFR BLD MANUAL: 4.1 % (ref 0–5)
PLATELET # BLD AUTO: 4 10*3/MM3 (ref 140–450)
POIKILOCYTOSIS BLD QL SMEAR: ABNORMAL
POTASSIUM SERPL-SCNC: 3.9 MMOL/L (ref 3.5–5.2)
PROT SERPL-MCNC: 7.6 G/DL (ref 6–8.5)
RBC # BLD AUTO: 3.38 10*6/MM3 (ref 3.77–5.28)
SMALL PLATELETS BLD QL SMEAR: ABNORMAL
SODIUM SERPL-SCNC: 135 MMOL/L (ref 136–145)
VARIANT LYMPHS NFR BLD MANUAL: 5.4 % (ref 0–5)
VARIANT LYMPHS NFR BLD MANUAL: 74.3 % (ref 19.6–45.3)
WBC MORPH BLD: NORMAL
WBC NRBC COR # BLD: 2.09 10*3/MM3 (ref 3.4–10.8)

## 2022-02-11 PROCEDURE — 99284 EMERGENCY DEPT VISIT MOD MDM: CPT

## 2022-02-11 PROCEDURE — 80053 COMPREHEN METABOLIC PANEL: CPT | Performed by: INTERNAL MEDICINE

## 2022-02-11 PROCEDURE — 87635 SARS-COV-2 COVID-19 AMP PRB: CPT | Performed by: INTERNAL MEDICINE

## 2022-02-11 PROCEDURE — 86901 BLOOD TYPING SEROLOGIC RH(D): CPT | Performed by: INTERNAL MEDICINE

## 2022-02-11 PROCEDURE — 85025 COMPLETE CBC W/AUTO DIFF WBC: CPT | Performed by: INTERNAL MEDICINE

## 2022-02-11 PROCEDURE — 86900 BLOOD TYPING SEROLOGIC ABO: CPT | Performed by: INTERNAL MEDICINE

## 2022-02-11 PROCEDURE — 85007 BL SMEAR W/DIFF WBC COUNT: CPT | Performed by: INTERNAL MEDICINE

## 2022-02-11 PROCEDURE — C9803 HOPD COVID-19 SPEC COLLECT: HCPCS

## 2022-02-11 PROCEDURE — 96372 THER/PROPH/DIAG INJ SC/IM: CPT

## 2022-02-11 RX ORDER — CLONIDINE HYDROCHLORIDE 0.1 MG/1
0.1 TABLET ORAL ONCE
Status: COMPLETED | OUTPATIENT
Start: 2022-02-11 | End: 2022-02-11

## 2022-02-11 RX ADMIN — CLONIDINE HYDROCHLORIDE 0.1 MG: 0.1 TABLET ORAL at 23:34

## 2022-02-12 VITALS
HEART RATE: 101 BPM | DIASTOLIC BLOOD PRESSURE: 65 MMHG | TEMPERATURE: 97.8 F | WEIGHT: 134.26 LBS | BODY MASS INDEX: 25.35 KG/M2 | SYSTOLIC BLOOD PRESSURE: 142 MMHG | HEIGHT: 61 IN | OXYGEN SATURATION: 97 % | RESPIRATION RATE: 18 BRPM

## 2022-02-12 PROBLEM — D69.6 THROMBOCYTOPENIA (HCC): Status: ACTIVE | Noted: 2022-02-12

## 2022-02-12 PROBLEM — I10 PRIMARY HYPERTENSION: Status: ACTIVE | Noted: 2022-02-12

## 2022-02-12 LAB
ABO GROUP BLD: NORMAL
ALBUMIN SERPL-MCNC: 4 G/DL (ref 3.5–5.2)
ALBUMIN/GLOB SERPL: 1.2 G/DL
ALP SERPL-CCNC: 72 U/L (ref 39–117)
ALT SERPL W P-5'-P-CCNC: 22 U/L (ref 1–33)
ANION GAP SERPL CALCULATED.3IONS-SCNC: 13 MMOL/L (ref 5–15)
ANISOCYTOSIS BLD QL: ABNORMAL
AST SERPL-CCNC: 16 U/L (ref 1–32)
BILIRUB SERPL-MCNC: 0.8 MG/DL (ref 0–1.2)
BUN SERPL-MCNC: 10 MG/DL (ref 8–23)
BUN/CREAT SERPL: 14.7 (ref 7–25)
CALCIUM SPEC-SCNC: 9.2 MG/DL (ref 8.6–10.5)
CHLORIDE SERPL-SCNC: 98 MMOL/L (ref 98–107)
CO2 SERPL-SCNC: 23 MMOL/L (ref 22–29)
CREAT SERPL-MCNC: 0.68 MG/DL (ref 0.57–1)
DACRYOCYTES BLD QL SMEAR: ABNORMAL
DEPRECATED RDW RBC AUTO: 41.1 FL (ref 37–54)
EOSINOPHIL # BLD MANUAL: 0.01 10*3/MM3 (ref 0–0.4)
EOSINOPHIL NFR BLD MANUAL: 1 % (ref 0.3–6.2)
ERYTHROCYTE [DISTWIDTH] IN BLOOD BY AUTOMATED COUNT: 13.8 % (ref 12.3–15.4)
GFR SERPL CREATININE-BSD FRML MDRD: 87 ML/MIN/1.73
GLOBULIN UR ELPH-MCNC: 3.3 GM/DL
GLUCOSE SERPL-MCNC: 127 MG/DL (ref 65–99)
HBA1C MFR BLD: 6 % (ref 4.8–5.6)
HCT VFR BLD AUTO: 25.7 % (ref 34–46.6)
HGB BLD-MCNC: 9.2 G/DL (ref 12–15.9)
LYMPHOCYTES # BLD MANUAL: 0.94 10*3/MM3 (ref 0.7–3.1)
LYMPHOCYTES NFR BLD MANUAL: 8.2 % (ref 5–12)
MCH RBC QN AUTO: 29.9 PG (ref 26.6–33)
MCHC RBC AUTO-ENTMCNC: 35.8 G/DL (ref 31.5–35.7)
MCV RBC AUTO: 83.4 FL (ref 79–97)
MONOCYTES # BLD: 0.12 10*3/MM3 (ref 0.1–0.9)
NEUTROPHILS # BLD AUTO: 0.33 10*3/MM3 (ref 1.7–7)
NEUTROPHILS NFR BLD MANUAL: 20.6 % (ref 42.7–76)
NEUTS BAND NFR BLD MANUAL: 3.1 % (ref 0–5)
NRBC SPEC MANUAL: 1 /100 WBC (ref 0–0.2)
PLATELET # BLD AUTO: 37 10*3/MM3 (ref 140–450)
PMV BLD AUTO: 11.6 FL (ref 6–12)
POIKILOCYTOSIS BLD QL SMEAR: ABNORMAL
POTASSIUM SERPL-SCNC: 3.9 MMOL/L (ref 3.5–5.2)
PROT SERPL-MCNC: 7.3 G/DL (ref 6–8.5)
RBC # BLD AUTO: 3.08 10*6/MM3 (ref 3.77–5.28)
RH BLD: NEGATIVE
SARS-COV-2 RNA PNL SPEC NAA+PROBE: NOT DETECTED
SMALL PLATELETS BLD QL SMEAR: ABNORMAL
SODIUM SERPL-SCNC: 134 MMOL/L (ref 136–145)
VARIANT LYMPHS NFR BLD MANUAL: 1 % (ref 0–5)
VARIANT LYMPHS NFR BLD MANUAL: 66 % (ref 19.6–45.3)
WBC MORPH BLD: NORMAL
WBC NRBC COR # BLD: 1.41 10*3/MM3 (ref 3.4–10.8)

## 2022-02-12 PROCEDURE — 83036 HEMOGLOBIN GLYCOSYLATED A1C: CPT | Performed by: INTERNAL MEDICINE

## 2022-02-12 PROCEDURE — 85025 COMPLETE CBC W/AUTO DIFF WBC: CPT | Performed by: INTERNAL MEDICINE

## 2022-02-12 PROCEDURE — G0378 HOSPITAL OBSERVATION PER HR: HCPCS

## 2022-02-12 PROCEDURE — 85007 BL SMEAR W/DIFF WBC COUNT: CPT | Performed by: INTERNAL MEDICINE

## 2022-02-12 PROCEDURE — P9035 PLATELET PHERES LEUKOREDUCED: HCPCS

## 2022-02-12 PROCEDURE — P9100 PATHOGEN TEST FOR PLATELETS: HCPCS

## 2022-02-12 PROCEDURE — 36415 COLL VENOUS BLD VENIPUNCTURE: CPT | Performed by: INTERNAL MEDICINE

## 2022-02-12 PROCEDURE — 36430 TRANSFUSION BLD/BLD COMPNT: CPT

## 2022-02-12 PROCEDURE — 63710000001 DIPHENHYDRAMINE PER 50 MG: Performed by: NURSE PRACTITIONER

## 2022-02-12 PROCEDURE — 80053 COMPREHEN METABOLIC PANEL: CPT | Performed by: INTERNAL MEDICINE

## 2022-02-12 RX ORDER — HYDROCODONE BITARTRATE AND ACETAMINOPHEN 5; 325 MG/1; MG/1
1 TABLET ORAL EVERY 6 HOURS PRN
Status: DISCONTINUED | OUTPATIENT
Start: 2022-02-12 | End: 2022-02-12 | Stop reason: HOSPADM

## 2022-02-12 RX ORDER — CYCLOSPORINE 25 MG/1
25 CAPSULE, LIQUID FILLED ORAL 2 TIMES DAILY
Status: DISCONTINUED | OUTPATIENT
Start: 2022-02-12 | End: 2022-02-12 | Stop reason: SDUPTHER

## 2022-02-12 RX ORDER — VALACYCLOVIR HYDROCHLORIDE 500 MG/1
1000 TABLET, FILM COATED ORAL DAILY
Status: DISCONTINUED | OUTPATIENT
Start: 2022-02-12 | End: 2022-02-12 | Stop reason: HOSPADM

## 2022-02-12 RX ORDER — AMLODIPINE BESYLATE 5 MG/1
5 TABLET ORAL DAILY
Status: DISCONTINUED | OUTPATIENT
Start: 2022-02-12 | End: 2022-02-12

## 2022-02-12 RX ORDER — FOLIC ACID 1 MG/1
1 TABLET ORAL DAILY
Status: DISCONTINUED | OUTPATIENT
Start: 2022-02-12 | End: 2022-02-12 | Stop reason: HOSPADM

## 2022-02-12 RX ORDER — AMLODIPINE BESYLATE 10 MG/1
10 TABLET ORAL DAILY
Status: DISCONTINUED | OUTPATIENT
Start: 2022-02-12 | End: 2022-02-12 | Stop reason: HOSPADM

## 2022-02-12 RX ORDER — AMLODIPINE BESYLATE 10 MG/1
10 TABLET ORAL DAILY
Qty: 30 TABLET | Refills: 0 | Status: SHIPPED | OUTPATIENT
Start: 2022-02-13

## 2022-02-12 RX ORDER — DOCUSATE SODIUM 100 MG/1
100 CAPSULE, LIQUID FILLED ORAL 2 TIMES DAILY
COMMUNITY

## 2022-02-12 RX ORDER — SODIUM CHLORIDE 0.9 % (FLUSH) 0.9 %
10 SYRINGE (ML) INJECTION AS NEEDED
Status: DISCONTINUED | OUTPATIENT
Start: 2022-02-12 | End: 2022-02-12 | Stop reason: HOSPADM

## 2022-02-12 RX ORDER — FAMOTIDINE 20 MG/1
20 TABLET, FILM COATED ORAL ONCE
Status: COMPLETED | OUTPATIENT
Start: 2022-02-12 | End: 2022-02-12

## 2022-02-12 RX ORDER — SODIUM CHLORIDE 0.9 % (FLUSH) 0.9 %
10 SYRINGE (ML) INJECTION EVERY 12 HOURS SCHEDULED
Status: DISCONTINUED | OUTPATIENT
Start: 2022-02-12 | End: 2022-02-12 | Stop reason: HOSPADM

## 2022-02-12 RX ORDER — CYCLOSPORINE 25 MG/1
50 CAPSULE, LIQUID FILLED ORAL 2 TIMES DAILY
Status: DISCONTINUED | OUTPATIENT
Start: 2022-02-12 | End: 2022-02-12 | Stop reason: SDUPTHER

## 2022-02-12 RX ORDER — ONDANSETRON 2 MG/ML
4 INJECTION INTRAMUSCULAR; INTRAVENOUS EVERY 6 HOURS PRN
Status: DISCONTINUED | OUTPATIENT
Start: 2022-02-12 | End: 2022-02-12 | Stop reason: HOSPADM

## 2022-02-12 RX ORDER — PANTOPRAZOLE SODIUM 20 MG/1
20 TABLET, DELAYED RELEASE ORAL 2 TIMES DAILY
Status: DISCONTINUED | OUTPATIENT
Start: 2022-02-12 | End: 2022-02-12 | Stop reason: HOSPADM

## 2022-02-12 RX ORDER — DIPHENHYDRAMINE HCL 50 MG
50 CAPSULE ORAL EVERY 6 HOURS PRN
Status: DISCONTINUED | OUTPATIENT
Start: 2022-02-12 | End: 2022-02-12 | Stop reason: HOSPADM

## 2022-02-12 RX ORDER — ACETAMINOPHEN 325 MG/1
650 TABLET ORAL EVERY 4 HOURS PRN
Status: DISCONTINUED | OUTPATIENT
Start: 2022-02-12 | End: 2022-02-12 | Stop reason: HOSPADM

## 2022-02-12 RX ORDER — CLONIDINE HYDROCHLORIDE 0.1 MG/1
0.1 TABLET ORAL EVERY 8 HOURS PRN
Status: DISCONTINUED | OUTPATIENT
Start: 2022-02-12 | End: 2022-02-12 | Stop reason: HOSPADM

## 2022-02-12 RX ORDER — FLUCONAZOLE 200 MG/1
400 TABLET ORAL DAILY
Status: DISCONTINUED | OUTPATIENT
Start: 2022-02-12 | End: 2022-02-12 | Stop reason: HOSPADM

## 2022-02-12 RX ORDER — CETIRIZINE HYDROCHLORIDE 10 MG/1
10 TABLET ORAL DAILY
Status: DISCONTINUED | OUTPATIENT
Start: 2022-02-12 | End: 2022-02-12 | Stop reason: HOSPADM

## 2022-02-12 RX ADMIN — AMLODIPINE BESYLATE 10 MG: 10 TABLET ORAL at 09:19

## 2022-02-12 RX ADMIN — CETIRIZINE HYDROCHLORIDE TABLETS 10 MG: 10 TABLET, FILM COATED ORAL at 10:31

## 2022-02-12 RX ADMIN — FAMOTIDINE 20 MG: 20 TABLET, FILM COATED ORAL at 10:31

## 2022-02-12 RX ADMIN — SODIUM CHLORIDE, PRESERVATIVE FREE 10 ML: 5 INJECTION INTRAVENOUS at 09:20

## 2022-02-12 RX ADMIN — FOLIC ACID 1 MG: 1 TABLET ORAL at 10:31

## 2022-02-12 RX ADMIN — ACETAMINOPHEN 650 MG: 325 TABLET, FILM COATED ORAL at 10:31

## 2022-02-12 RX ADMIN — VALACYCLOVIR HYDROCHLORIDE 1000 MG: 500 TABLET, FILM COATED ORAL at 10:31

## 2022-02-12 RX ADMIN — ELTROMBOPAG OLAMINE 150 MG: 75 TABLET, FILM COATED ORAL at 07:00

## 2022-02-12 RX ADMIN — DIPHENHYDRAMINE HYDROCHLORIDE 50 MG: 50 CAPSULE ORAL at 10:31

## 2022-02-12 RX ADMIN — FLUCONAZOLE 400 MG: 200 TABLET ORAL at 10:31

## 2022-02-12 NOTE — DISCHARGE SUMMARY
[facility] Medicine Services  DISCHARGE SUMMARY     Date of Admission: 2/11/2022  Date of Discharge:  2/12/2022  Primary Care Physician: Faith Alcaraz APRN    Presenting Problem/History of Present Illness:  Thrombocytopenia (HCC) [D69.6]     Discharge Diagnoses:  Active Hospital Problems    Diagnosis    • Thrombocytopenia (HCC)    • Primary hypertension    • AIHA (autoimmune hemolytic anemia) (HCC)    • Naveen's syndrome (HCC)      Oncology/Hematology History Overview Note     HEME/ONC DX/RX/INVESTIGATIONS SUMMARY:   DX:   Naveen's syndrome (with low level MARTA - detected once on 12/6/2021).  Prior dx of PNH (minimal neutropenia, severe anemia, severe thrombocytopenia), changed to Naveen's syndrome on 12/9/2021, see PN from 12/9/2021 and PN from Dr. Blanchard, from [facility] from 12/8/2021.  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  Unremarkable BMBX on 10/4/2021. Anemic symptoms started in 2020, petechiae started in 6/2021.    Iron overload, transfusional.    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 Patient is A-.    10/21/2021, iron overload probably 2nd PRBCs.    10/23/2021, CT abd, undetermined 2.6 cm R adnexal lesion, needs further eval. when patient is more stable.    RX:   9/21/2021, 2U PRBCs and 2 platelet transfusions.  10/15/2021, 1U PRBC.  11/1/2021, 1U single donor PLT. 1U PRBC.  11/5/2021, 2U singe donor PLT.  11/12/2021, 1U PRBC.  11/17/2021 to 11/20/2021, Admitted to [facility], received IVIG (did not respond), 1 U PRBC, 3 PLT transfusions.  12/2/2021, 1U PRBC.  12/8/2021, platelet transfusion at [facility].  12/14/2021, 1U PRBC.   12/28/2021, 1 U PRBC        10/15/2021, prednisone: start 80 mg QD. 10/22/2021, DC prednisone.  10/22/2021, Meningococcus vaccines 1st dose (Bexsero and Menactra)  11/8/2021, Ultomiris, 1st dose (loading dose). 11/22/2021 Ultomiris 2nd dose (start rx Q8wks).  "  11/8/20201, Cipro 500 mg PO BID x 14 days (prophylactic b/o Ultomiris).  11/24/2021, Nplate 1 mcg/Kg SubQ x one dose.  11/24/2021, folic acid 1 mg PO daily rxd (b/o chronic hemolysis).  11/24/2021, start Promacta 50 mg QD. Changed to 100 mg QD on 12/8/2021.    12/16/2021, rituximab 375 mg/m2 weekly x 4 doses.   12/16/2021, dexamethasone: 40 mg daily x 4 doses.  12/16/2021, prophylactic meds: Septra DS, one tab PO M/W/F. Valtrex, 1000 mg daily. Levaquin 500 mg daily, start after dexamethasone.    OTHER INFO:   No prior hematological history or significant medical history until 9/21/2021.      INFO FROM [place]:  \"Associated attestation - Felipe Maciel MD - 11/18/2021 12:10 PM CST Formatting of this note might be different from the original. Agree with resident interpretation of serologic testing.  Given stated evaluation of Naveen's syndrome evaluation, note that the patient's plasma did not react with their own RBCs, thus a negative auto-control. This is not consistent with immune mediated red blood cell destruction.\"     INVESTIGATIONS:   9/21/2021-9:48 AM, WBC 3.4 hemoglobin 7.7 .8 platelets 12 neutrophils 1.1 lymphocytes 1.8.  9/21/2021-17:20 PM, WBC 3.9 hemoglobin 8.3 .1 platelets 23.  9/22/2021, folic acid 25.9 ferritin 365 B12 368 HBsAg negative, HIV negative, hep C antibody negative reticulocyte 4.0%, PT 10.7, INR 1.0 PTT 25.3. Fecal occult blood negative.  9/22/2021, 1:37 AM, WBC 3.6 hemoglobin 7.0 .5 platelets 11 neutrophils 1.06 lymphocytes 2.19.  9/23/2021, WBC 4.0 hemoglobin 10.4 .1 platelets 12.  Neutrophils 1.38 lymphocytes 2.0. BUN 11 creatinine 0.47 calcium 8.5 bilirubin 0.7 ALT 31 AST 26 alk phos 68 albumin 3.7 total protein 7.1.  10/4/201, Diagnosis: BONE MARROW - PERIPHERAL BLOOD SMEAR, ASPIRATE SMEAR, PARTICLE PREPARATION, BIOPSY AND FLOW CYTOMETRY: NORMOCELLULAR MARROW WITH MATURING TRILINEAGE HEMATOPOIESIS, ERYTHROID ATYPIA, AND NO INCREASE IN BLASTS; SEE " IMPRESSION.   10/4/2021, Myeloid NGS Panel RESULTS SUMMARY: (Final).Inconclusive: A variant of uncertain significance was detected in this patient's sample.   10/4/2021, PERIPHERAL BLOOD SMEAR ([facility]):   CBC : WBC 4.6 k/microL, Hgb 9.5 g/dL, Plt-Ct 13 k/microL,  fL, RDW 20.8%.   A Pitt's stained peripheral smear is reviewed. Erythrocytes are decreased and are macrocytic and normochromic with anisopoikilocytosis. Polychromasia is present. Neutrophils are adequate in number, and demonstrate no significant morphologic abnormalities. Lymphocytes are adequate in number, and demonstrate no significant morphologic abnormalities. There are no circulating blasts, plasma cells, or abnormal lymphocytes. Platelets are decreased in number, and demonstrate no significant morphologic abnormalities.     10/13/2021, EPO 1,966.  Haptoglobin < 8.  .  Reticulocyte 4%.  10/15/2021, WBC 3.71 hemoglobin 7.6 .3 platelets 11.  10/18/2021, MARTA negative.    10/22/2021, PNH profile:  Comment: Peripheral Blood:   Glycosylphosphatidylinositol (GPI) anchor deficient cells detected (4.48% of granulocytes, 5.93% of monocytes, 0.07% of red blood cells have features of type II red blood cells, 0.50% of red blood cells have features of type III red blood cells).  10/23/2021, CT chest angio:  No evidence of pulmonary embolus. No acute findings.    10/23/2021, CT abd angio:  1.  No major arterial occlusion or flow-limiting stenosis. Nonaneurysmal  abdominal aorta with heavy atherosclerotic calcification.  2.  2.6 cm indeterminate RIGHT adnexal lesion. Recommend correlation  with pelvic ultrasound.  11/8/2021, type & screen, anti-E antibody  11/12/2021, type & screen, anti-E antibody and anti-D antibody  11/12/2021, [id number] Activity, >100.0 (>66.8) (Normal result).  11/19/2021, CT chest/abd at [facility], unremarkable.  12/2/221, see hemolytic markers at bottom of PN from 12/2/2021.  12/6/2021, MARTA IgG positive  ([facility]).  12/8/2021, MARTA negative ([facility]).  12/14/2021, MARTA negative ([facility]).            • Immune thrombocytopenia (HCC)      Chief Complaint on Day of Discharge: No additional complaints.    History of Present Illness on Day of Discharge:   Lying in bed.   in room.  She had platelet transfusion today.  Platelets 4 and she received platelet transfusion with follow-up platelets up to 37.  She denies nausea, vomiting or abdominal pain.  Denies chest pain or palpitations.  Reported blood pressure has been more elevated.  Norvasc increased to 10 mg daily.  She has follow-up with [facility] 2/23.  Recommend follow-up with primary care provider.  Continue outpatient platelet transfusion per [facility] hematology.    Consults: None    Procedures Performed: None    Pertinent Test Results:   Results for orders placed in visit on 10/27/21    Adult Stress Echo W/ Cont or Stress Agent if Necessary Per Protocol    Interpretation Summary  · Left ventricular ejection fraction appears to be 56 - 60%. Left ventricular systolic function is normal.    LOW RISK FOR ISCHEMIA      Laboratory Data Last 7 Days:  Results from last 7 days   Lab Units 02/12/22  1506 02/11/22 2247 02/07/22  1012   WBC 10*3/mm3 1.41* 2.09* 1.90*   HEMOGLOBIN g/dL 9.2* 9.9* 8.4*   HEMATOCRIT % 25.7* 28.0* 24.3*   PLATELETS 10*3/mm3 37* 4* 4*     Results from last 7 days   Lab Units 02/12/22  1506 02/11/22 2247 02/11/22 2247   SODIUM mmol/L 134*  --  135*   POTASSIUM mmol/L 3.9  --  3.9   CHLORIDE mmol/L 98  --  98   CO2 mmol/L 23.0  --  23.0   BUN mg/dL 10  --  8   CREATININE mg/dL 0.68  --  0.54*   GLUCOSE mg/dL 127*   < > 140*   CALCIUM mg/dL 9.2  --  8.8   ALT (SGPT) U/L 22  --  18    < > = values in this interval not displayed.       Laboratory Data Last 7 Days:    Lab Results (last 7 days)     Procedure Component Value Units Date/Time    Comprehensive Metabolic Panel [[id number]]  (Abnormal) Collected: 02/12/22 1506    Specimen: Blood Updated:  02/12/22 1534     Glucose 127 mg/dL      BUN 10 mg/dL      Creatinine 0.68 mg/dL      Sodium 134 mmol/L      Potassium 3.9 mmol/L      Comment: Slight hemolysis detected by analyzer. Results may be affected.        Chloride 98 mmol/L      CO2 23.0 mmol/L      Calcium 9.2 mg/dL      Total Protein 7.3 g/dL      Albumin 4.00 g/dL      ALT (SGPT) 22 U/L      AST (SGOT) 16 U/L      Alkaline Phosphatase 72 U/L      Total Bilirubin 0.8 mg/dL      eGFR Non African Amer 87 mL/min/1.73      Globulin 3.3 gm/dL      A/G Ratio 1.2 g/dL      BUN/Creatinine Ratio 14.7     Anion Gap 13.0 mmol/L     Narrative:      GFR Normal >60  Chronic Kidney Disease <60  Kidney Failure <15      CBC Auto Differential [[id number]]  (Abnormal) Collected: 02/12/22 1506    Specimen: Blood Updated: 02/12/22 1528     WBC 1.41 10*3/mm3      RBC 3.08 10*6/mm3      Hemoglobin 9.2 g/dL      Hematocrit 25.7 %      MCV 83.4 fL      MCH 29.9 pg      MCHC 35.8 g/dL      RDW 13.8 %      RDW-SD 41.1 fl      MPV 11.6 fL      Platelets 37 10*3/mm3     Manual Differential [[id number]] Collected: 02/12/22 1506    Specimen: Blood Updated: 02/12/22 1517    Hemoglobin A1c [[id number]] Collected: 02/12/22 1506    Specimen: Blood Updated: 02/12/22 1513    COVID PRE-OP / PRE-PROCEDURE SCREENING ORDER (NO ISOLATION) - Swab, Nasal Cavity [[id number]]  (Normal) Collected: 02/11/22 2346    Specimen: Swab from Nasal Cavity Updated: 02/12/22 0051    Narrative:      The following orders were created for panel order COVID PRE-OP / PRE-PROCEDURE SCREENING ORDER (NO ISOLATION) - Swab, Nasal Cavity.  Procedure                               Abnormality         Status                     ---------                               -----------         ------                     COVID-19,[facility] IN-LALIT...[[id number]]  Normal              Final result                 Please view results for these tests on the individual orders.    COVID-19,[facility] IN-HOUSE,Nasal Swab No Transport Media 3-4 HR  TAT - Swab, Nasal Cavity [[id number]]  (Normal) Collected: 02/11/22 2346    Specimen: Swab from Nasal Cavity Updated: 02/12/22 0051     COVID19 Not Detected    Narrative:      Fact sheet for providers: https://www.fda.gov/media/813607/download     Fact sheet for patients: https://www.fda.gov/media/238177/download    Test performed by PCR.    Consider negative results in combination with clinical observations, patient history, and epidemiological information.    Manual Differential [[id number]]  (Abnormal) Collected: 02/11/22 2247    Specimen: Blood Updated: 02/11/22 2354     Neutrophil % 8.1 %      Lymphocyte % 74.3 %      Monocyte % 8.1 %      Bands %  4.1 %      Atypical Lymphocyte % 5.4 %      Neutrophils Absolute 0.25 10*3/mm3      Lymphocytes Absolute 1.67 10*3/mm3      Monocytes Absolute 0.17 10*3/mm3      Anisocytosis Slight/1+     Dacrocytes Slight/1+     Poikilocytes Slight/1+     WBC Morphology Normal     Platelet Estimate Decreased    Comprehensive Metabolic Panel [[id number]]  (Abnormal) Collected: 02/11/22 2247    Specimen: Blood Updated: 02/11/22 2327     Glucose 140 mg/dL      BUN 8 mg/dL      Creatinine 0.54 mg/dL      Sodium 135 mmol/L      Potassium 3.9 mmol/L      Comment: Slight hemolysis detected by analyzer. Results may be affected.        Chloride 98 mmol/L      CO2 23.0 mmol/L      Calcium 8.8 mg/dL      Total Protein 7.6 g/dL      Albumin 4.10 g/dL      ALT (SGPT) 18 U/L      AST (SGOT) 12 U/L      Comment: Slight hemolysis detected by analyzer. Results may be affected.        Alkaline Phosphatase 74 U/L      Total Bilirubin 0.9 mg/dL      eGFR Non African Amer 114 mL/min/1.73      Globulin 3.5 gm/dL      A/G Ratio 1.2 g/dL      BUN/Creatinine Ratio 14.8     Anion Gap 14.0 mmol/L     Narrative:      GFR Normal >60  Chronic Kidney Disease <60  Kidney Failure <15      CBC & Differential [[id number]]  (Abnormal) Collected: 02/11/22 2247    Specimen: Blood Updated: 02/11/22 2322    Narrative:       The following orders were created for panel order CBC & Differential.  Procedure                               Abnormality         Status                     ---------                               -----------         ------                     CBC Auto Differential[[id number]]        Abnormal            Final result                 Please view results for these tests on the individual orders.    CBC Auto Differential [[id number]]  (Abnormal) Collected: 02/11/22 2247    Specimen: Blood Updated: 02/11/22 2322     WBC 2.09 10*3/mm3      RBC 3.38 10*6/mm3      Hemoglobin 9.9 g/dL      Hematocrit 28.0 %      MCV 82.8 fL      MCH 29.3 pg      MCHC 35.4 g/dL      RDW 13.6 %      RDW-SD 40.3 fl      Platelets 4 10*3/mm3         Imaging Results (All)     None        Hospital Course  Patient is a 66 y.o. female presented to [facility] emergency room 2/11/2022 with concerns for hypertension and low platelet count.  She has thrombocytopenia with anaplastic anemia, Naveen syndrome and immune mediated thrombocytopenia.  She has required platelet transfusions per [facility]ology.  Most recent posttransfusion platelet count 17,000.  Patient reported taking extra dose of amlodipine at home because blood pressure elevated 151/60.  She reported blood pressure higher than normal.  She denied fever, chills, chest pain or shortness of breath.  Labs check and platelets 4000.  She is followed by , hematology at [facility].  She has been requiring outpatient platelet infusions.  Her hematologist had arranged for outpatient transfusions at [facility] but due to crossmatching problems,  reported that it usually took an extra day for the platelets to be available.  Plans were for platelet transfusion but according to ER staff it was going to take 12 hours before platelets would be able to arrive from [facility].  She was admitted to the medical floor for platelet transfusion.    She was admitted to the medical  "floor for platelet transfusion.  Platelets 4000.  She was premedicated with Benadryl, Pepcid, andTylenol.  Platelets transfused and posttransfusion platelet count 37,000.  Patient's most recent platelet transfusion was on 2/1/2022.  She was recently seen by Dr. Diaz on 2/7/2022 but Dr. Diaz was unable to continue to follow patient as outpatient and arrange outpatient platelet transfusion as he is oncologist and does not practice hematology.  Patient's primary hematologist  will continue to arrange outpatient platelet transfusion at [facility] or at [facility].  She has follow-up appointment with [facility] hematology on 2/23/2022.    She has a history of hypertension and takes amlodipine 5 mg daily.  She reported blood pressure has been more elevated recently with systolic readings greater than 150.  Blood pressure 150/82, 177/85.  Amlodipine increased to 10 mg orally daily.  Discussed with patient.  As needed clonidine ordered on admission.    Home medications reviewed and resumed if appropriate.    On 2/12/2022 she is stable for discharge after receiving 1 unit of platelets.  Platelet count 4000 on admission and 37,000 posttransfusion.  Recommend follow-up with primary care provider DEVIN Montgomery in 1 week and keep scheduled appointment with  on 2/23/2.  Additional outpatient platelet transfusion to be arranged by primary hematologist.    Physical Exam on Discharge:  /65   Pulse 101   Temp 97.8 °F (36.6 °C) (Oral)   Resp 18   Ht 154.9 cm (61\")   Wt 60.9 kg (134 lb 4.2 oz)   SpO2 97%   BMI 25.37 kg/m²   Physical Exam  Vitals and nursing note reviewed.   Constitutional:       Comments: Lying in bed.  No oxygen in use.   in room.   HENT:      Head: Normocephalic and atraumatic.      Nose: No congestion or rhinorrhea.      Mouth/Throat:      Pharynx: Oropharynx is clear. No oropharyngeal exudate or posterior oropharyngeal erythema.   Eyes:      Extraocular Movements: " Extraocular movements intact.      Pupils: Pupils are equal, round, and reactive to light.   Cardiovascular:      Rate and Rhythm: Normal rate and regular rhythm.   Pulmonary:      Breath sounds: No wheezing, rhonchi or rales.      Comments: No oxygen in use.  Abdominal:      Palpations: Abdomen is soft.      Tenderness: There is no abdominal tenderness.   Genitourinary:     Comments: Voiding.  Musculoskeletal:         General: No swelling or tenderness.      Cervical back: Normal range of motion and neck supple.   Skin:     General: Skin is warm and dry.   Neurological:      General: No focal deficit present.      Mental Status: She is alert and oriented to person, place, and time.   Psychiatric:         Mood and Affect: Mood normal.         Behavior: Behavior normal.         Thought Content: Thought content normal.         Judgment: Judgment normal.         Condition on Discharge: Stable    Discharge Disposition: Home with     Discharge Diet:   Diet Instructions     Advance Diet As Tolerated            Activity at Discharge:   Activity Instructions     Activity as Tolerated          Discharge Care Plan / Instructions:   1.  Keep scheduled appointment with [facility] 2/23/2022.  2.  Outpatient platelet transfusion per Dr. Blanchard.  3.  Increase amlodipine to 10 mg orally daily  4.  Follow-up with DEVIN Montgomery in 1 week    Discharge Medications:     Discharge Medications      Changes to Medications      Instructions Start Date   amLODIPine 10 MG tablet  Commonly known as: NORVASC  What changed:   · medication strength  · how much to take   10 mg, Oral, Daily   Start Date: February 13, 2022        Continue These Medications      Instructions Start Date   docusate sodium 100 MG capsule  Commonly known as: COLACE   100 mg, Oral, 2 Times Daily      eltrombopag 75 MG tablet tablet  Commonly known as: PROMACTA   150 mg, Oral, Daily, Administer on an empty stomach, 1 hour before or 2 hours after a meal        fluconazole 200 MG tablet  Commonly known as: DIFLUCAN   400 mg, Oral, Daily      folic acid 1 MG tablet  Commonly known as: FOLVITE   1 mg, Oral, Daily      loratadine 10 MG tablet  Commonly known as: CLARITIN   10 mg, Oral, Daily      pantoprazole 20 MG EC tablet  Commonly known as: PROTONIX   20 mg, Oral, 2 Times Daily      valACYclovir 1000 MG tablet  Commonly known as: VALTREX   1,000 mg, Oral, Daily, Take one tab daily for prevention of shingles.           Follow-up Appointments:    Follow-up Information     Faith Alcaraz APRN Follow up.    Specialty: Family Medicine  Why: 1 week  call and make appt  Contact information:  [address] [place] [id number]  [phone]             [facility] Follow up.    Why: Keep scheduled appointment 2/23/2022                       Future Appointments:  No future appointments.    Test Results Pending at Discharge: None    The above documentation resulted from a face-to-face encounter by me Kandi BELTRAN, [facility].    Electronically signed by DEVIN Elaine, 2/12/2022, 16:31 CST.    Time: This discharge process required greater than 35 minutes for completion.    Plan discussed with Dr. Dorantes, patient, and .    Time spent in face-to-face evaluation, chart review, planning and education greater than 35 minutes.

## 2022-02-12 NOTE — ED PROVIDER NOTES
Subjective   Patient is a 66-year-old female who presented to the emergency room with hypotension and concern about low platelet count.  She states that she has had significant issues with thrombocytopenia given the fact that she has aplastic anemia she states that she had a platelet transfusion at [facility] the other day but even after the transfusion her platelet count was only 17 she states that her low white platelet count is a continuing issue to however she is quite concerned because of her blood pressure she states that only a blood pressure pill she has is amlodipine which she did take an extra 1 at home for coming to the ER but her blood pressure was above 1 5160 systolic and she states it is much higher than what her blood doctors want.  She denies fevers chills chest pain shortness of breath.          Review of Systems   Constitutional: Positive for fatigue. Negative for chills and fever.   HENT: Negative for congestion and facial swelling.    Eyes: Negative for photophobia, discharge and visual disturbance.   Respiratory: Negative for cough, shortness of breath and wheezing.    Cardiovascular: Negative for chest pain, palpitations and leg swelling.   Gastrointestinal: Negative for abdominal pain, diarrhea, nausea and vomiting.   Endocrine: Negative for cold intolerance and heat intolerance.   Genitourinary: Negative for difficulty urinating and urgency.   Musculoskeletal: Negative for arthralgias, joint swelling and myalgias.   Skin: Negative for color change and pallor.   Neurological: Negative for dizziness and light-headedness.   Hematological: Negative for adenopathy. Does not bruise/bleed easily.   Psychiatric/Behavioral: Negative for agitation, behavioral problems and confusion.       Past Medical History:   Diagnosis Date   • Acid reflux    • Anemia    • PNH (paroxysmal nocturnal hemoglobinuria) (HCC)        No Known Allergies    Past Surgical History:   Procedure Laterality Date   • APPENDECTOMY          Family History   Problem Relation Age of Onset   • Diabetes Mother    • Hypertension Mother    • Heart attack Mother    • Hypertension Father    • Heart attack Father        Social History     Socioeconomic History   • Marital status:    Tobacco Use   • Smoking status: Never Smoker   • Smokeless tobacco: Never Used   Vaping Use   • Vaping Use: Never used   Substance and Sexual Activity   • Alcohol use: Not Currently   • Drug use: Never   • Sexual activity: Yes           Objective   Physical Exam  Vitals and nursing note reviewed.   Constitutional:       Appearance: Normal appearance. She is well-developed.   HENT:      Head: Normocephalic and atraumatic.      Nose: Nose normal.   Eyes:      Conjunctiva/sclera: Conjunctivae normal.      Pupils: Pupils are equal, round, and reactive to light.   Cardiovascular:      Rate and Rhythm: Normal rate and regular rhythm.      Heart sounds: Normal heart sounds.   Pulmonary:      Effort: Pulmonary effort is normal.      Breath sounds: Normal breath sounds.   Abdominal:      General: Bowel sounds are normal. There is no distension.      Palpations: Abdomen is soft.   Musculoskeletal:         General: Normal range of motion.      Cervical back: Normal range of motion and neck supple.   Skin:     General: Skin is warm and dry.   Neurological:      General: No focal deficit present.      Mental Status: She is alert and oriented to person, place, and time.      Cranial Nerves: No cranial nerve deficit.   Psychiatric:         Mood and Affect: Mood normal.         Behavior: Behavior normal.         Thought Content: Thought content normal.         Procedures           ED Course                                                 MDM    Final diagnoses:   Thrombocytopenia (HCC)   Aplastic anemia (HCC)       ED Disposition  ED Disposition     ED Disposition Condition Comment    Decision to Admit  Level of Care: Med/Surg [1]   Diagnosis: Thrombocytopenia (HCC) [[id number]]    Admitting Physician: MICHAEL NORIEGA [[id number]]   Attending Physician: MICHAEL NORIEGA [[id number]]            No follow-up provider specified.       Medication List      Changed    eltrombopag 50 MG tablet  Commonly known as: Promacta  Take 2 tablets by mouth Daily. Administer on an empty stomach, 1 hour before or 2 hours after a meal.  What changed:   · how much to take  · additional instructions             Osbaldo Peterson MD  02/12/22 0215

## 2022-02-12 NOTE — PLAN OF CARE
Goal Outcome Evaluation:           Progress: improving  Outcome Summary: pt did not complain of pain this shift. alert and oriented. VSS. Platelets infused this shift. Pre-infusion meds given (benadryl, tylenol, and Pepcid). no falls. room air. safety maintained.

## 2022-02-12 NOTE — H&P
[facility] Services  HISTORY AND PHYSICAL    Date of Admission: 2/11/2022  Primary Care Physician: Faith Alcaraz APRN    Subjective     Chief Complaint: HTN    History of Present Illness  66-year-old female who presents to the emergency department with hypertension.  She was concerned that her platelets were low.  Labs were checked, and she was have found to have a platelet level of 4.  She normally follows with a hematologist in [place].  She was going to receive platelets in the emergency department, but preparation of transfusion will take approximately 12 hours, therefore she is being admitted to observation, and will be transfused tomorrow.  The patient complained of left elbow joint pain last night.  She did present to the emergency department due to elevated blood pressure.  She had no chest pain or shortness of breath.  She is had no changes in her bowels or kidneys, and she states she takes Colace twice a day in order to keep her bowels regular.  She has had no bleeding to include her nose, bowels, or urine.        Review of Systems   Left elbow pain    Otherwise complete ROS reviewed and negative except as mentioned in the HPI.    Past Medical History:   Past Medical History:   Diagnosis Date   • Acid reflux    • Anemia    • PNH (paroxysmal nocturnal hemoglobinuria) (HCC)      Past Surgical History:  Past Surgical History:   Procedure Laterality Date   • APPENDECTOMY       Social History:  reports that she has never smoked. She has never used smokeless tobacco. She reports previous alcohol use. She reports that she does not use drugs.    Family History: family history includes Diabetes in her mother; Heart attack in her father and mother; Hypertension in her father and mother.       Allergies:  No Known Allergies    Medications:  Prior to Admission medications    Medication Sig Start Date End Date Taking? Authorizing Provider   amLODIPine (NORVASC) 5  "MG tablet Take 5 mg by mouth Daily.    Elan Bender MD   cycloSPORINE (sandIMMUNE) 25 MG capsule Take 25 mg by mouth 2 (Two) Times a Day.    Elan Bender MD   cycloSPORINE modified (GENGRAF) 50 MG capsule Take  by mouth 2 (Two) Times a Day.    Elan Bender MD   eltrombopag (Promacta) 50 MG tablet Take 2 tablets by mouth Daily. Administer on an empty stomach, 1 hour before or 2 hours after a meal. 1/12/22   Amador Medrano MD   fluconazole (DIFLUCAN) 200 MG tablet Take 200 mg by mouth Daily.    Elan Bender MD   folic acid (FOLVITE) 1 MG tablet Take 1 tablet by mouth Daily. 12/20/21   Amador Medrano MD   loratadine (CLARITIN) 10 MG tablet Take 10 mg by mouth Daily.    Elan Bender MD   pantoprazole (PROTONIX) 20 MG EC tablet Take 20 mg by mouth 2 (Two) Times a Day.    Elan Bender MD   valACYclovir (VALTREX) 1000 MG tablet Take 1 tablet by mouth Daily. Take one tab daily for prevention of shingles. 12/9/21   Devante Galvez MD     I have utilized all available immediate resources to obtain, update, and review the patient's current medications.    Objective     Vital Signs: /78 (BP Location: Right arm, Patient Position: Sitting)   Pulse 80   Temp 97.8 °F (36.6 °C) (Oral)   Resp 17   Ht 154.9 cm (61\")   Wt 59 kg (130 lb)   SpO2 98%   BMI 24.56 kg/m²   Physical Exam  Vitals reviewed.   Constitutional:       Appearance: Normal appearance.   HENT:      Head: Normocephalic and atraumatic.      Right Ear: External ear normal.      Left Ear: External ear normal.      Nose: Nose normal.   Eyes:      General: No scleral icterus.     Conjunctiva/sclera: Conjunctivae normal.   Cardiovascular:      Rate and Rhythm: Normal rate and regular rhythm.      Heart sounds: Normal heart sounds.   Pulmonary:      Effort: Pulmonary effort is normal.      Breath sounds: Normal breath sounds.   Musculoskeletal:         General: No swelling or tenderness.      Cervical back: " Normal range of motion and neck supple.   Skin:     General: Skin is warm and dry.   Neurological:      General: No focal deficit present.      Mental Status: She is alert.      Cranial Nerves: No cranial nerve deficit.   Psychiatric:         Mood and Affect: Mood normal.         Behavior: Behavior normal.       Results Reviewed:  Lab Results (last 24 hours)     Procedure Component Value Units Date/Time    COVID PRE-OP / PRE-PROCEDURE SCREENING ORDER (NO ISOLATION) - Swab, Nasal Cavity [[id number]]  (Normal) Collected: 02/11/22 2346    Specimen: Swab from Nasal Cavity Updated: 02/12/22 0051    Narrative:      The following orders were created for panel order COVID PRE-OP / PRE-PROCEDURE SCREENING ORDER (NO ISOLATION) - Swab, Nasal Cavity.  Procedure                               Abnormality         Status                     ---------                               -----------         ------                     COVID-19,[facility] IN-LALIT...[[id number]]  Normal              Final result                 Please view results for these tests on the individual orders.    COVID-19,[facility] IN-HOUSE,Nasal Swab No Transport Media 3-4 HR TAT - Swab, Nasal Cavity [[id number]]  (Normal) Collected: 02/11/22 2346    Specimen: Swab from Nasal Cavity Updated: 02/12/22 0051     COVID19 Not Detected    Narrative:      Fact sheet for providers: https://www.fda.gov/media/554044/download     Fact sheet for patients: https://www.fda.gov/media/650369/download    Test performed by PCR.    Consider negative results in combination with clinical observations, patient history, and epidemiological information.    Manual Differential [[id number]]  (Abnormal) Collected: 02/11/22 2247    Specimen: Blood Updated: 02/11/22 2354     Neutrophil % 8.1 %      Lymphocyte % 74.3 %      Monocyte % 8.1 %      Bands %  4.1 %      Atypical Lymphocyte % 5.4 %      Neutrophils Absolute 0.25 10*3/mm3      Lymphocytes Absolute 1.67 10*3/mm3      Monocytes Absolute 0.17  10*3/mm3      Anisocytosis Slight/1+     Dacrocytes Slight/1+     Poikilocytes Slight/1+     WBC Morphology Normal     Platelet Estimate Decreased    Comprehensive Metabolic Panel [[id number]]  (Abnormal) Collected: 02/11/22 2247    Specimen: Blood Updated: 02/11/22 2327     Glucose 140 mg/dL      BUN 8 mg/dL      Creatinine 0.54 mg/dL      Sodium 135 mmol/L      Potassium 3.9 mmol/L      Comment: Slight hemolysis detected by analyzer. Results may be affected.        Chloride 98 mmol/L      CO2 23.0 mmol/L      Calcium 8.8 mg/dL      Total Protein 7.6 g/dL      Albumin 4.10 g/dL      ALT (SGPT) 18 U/L      AST (SGOT) 12 U/L      Comment: Slight hemolysis detected by analyzer. Results may be affected.        Alkaline Phosphatase 74 U/L      Total Bilirubin 0.9 mg/dL      eGFR Non African Amer 114 mL/min/1.73      Globulin 3.5 gm/dL      A/G Ratio 1.2 g/dL      BUN/Creatinine Ratio 14.8     Anion Gap 14.0 mmol/L     Narrative:      GFR Normal >60  Chronic Kidney Disease <60  Kidney Failure <15      CBC & Differential [[id number]]  (Abnormal) Collected: 02/11/22 2247    Specimen: Blood Updated: 02/11/22 2322    Narrative:      The following orders were created for panel order CBC & Differential.  Procedure                               Abnormality         Status                     ---------                               -----------         ------                     CBC Auto Differential[[id number]]        Abnormal            Final result                 Please view results for these tests on the individual orders.    CBC Auto Differential [[id number]]  (Abnormal) Collected: 02/11/22 2247    Specimen: Blood Updated: 02/11/22 2322     WBC 2.09 10*3/mm3      RBC 3.38 10*6/mm3      Hemoglobin 9.9 g/dL      Hematocrit 28.0 %      MCV 82.8 fL      MCH 29.3 pg      MCHC 35.4 g/dL      RDW 13.6 %      RDW-SD 40.3 fl      Platelets 4 10*3/mm3         Imaging Results (Last 24 Hours)     ** No results found for the last 24 hours.  **        I have personally reviewed and interpreted the radiology studies and ECG obtained at time of admission.     Assessment / Plan     Assessment:   Active Hospital Problems    Diagnosis    • Thrombocytopenia (HCC)      Impression:  1.  Pancytopenia  2.  Hypertension  3.  Mild hyperglycemia   4.  Mild hyponatremia    Plan:   1.  Admit to observation  2.  Resume appropriate home medications   3.  Labs in the morning   4.  Transfusion when available, platelets  5.  A1c in the morning  6.  As needed Norco for elbow pain  7.  As needed clonidine every 8 hours for systolic blood pressure greater than 150      Code Status/Advanced Care Plan: Full    The patient's surrogate decision maker is family.     I discussed my findings and recommendations with the patient and family at bedside.    Estimated length of stay is overnight.     The patient was seen and examined by me on 2/12/2022 at 2.    Electronically signed by Cheryl Muro DO, 02/12/22, 02:00 CST.

## 2022-02-12 NOTE — NURSING NOTE
Spoke with Kandi Slater about critical lab values (hemoglobin and platelets) and okay to d/c patient.

## 2022-02-12 NOTE — PLAN OF CARE
Problem: Adult Inpatient Plan of Care  Goal: Plan of Care Review  2/12/2022 0619 by James García RN  Outcome: Ongoing, Progressing  Flowsheets (Taken 2/12/2022 0619)  Progress: no change  Plan of Care Reviewed With: patient  Outcome Summary: Pt is A&Ox4. No complaints of any pain. Voiding. awaiting platelets to come in from [place] to transfuse to patient. VSS. Safety maintained.

## 2022-02-13 LAB
BH BB BLOOD EXPIRATION DATE: NORMAL
BH BB BLOOD TYPE BARCODE: 600
BH BB DISPENSE STATUS: NORMAL
BH BB PRODUCT CODE: NORMAL
BH BB UNIT NUMBER: NORMAL
UNIT  ABO: NORMAL
UNIT  RH: NORMAL

## 2022-11-22 ENCOUNTER — SPECIALTY PHARMACY (OUTPATIENT)
Dept: ONCOLOGY | Facility: HOSPITAL | Age: 67
End: 2022-11-22

## 2023-05-09 NOTE — TELEPHONE ENCOUNTER
Left detailed voicemail with Jina at Dr. Chaves's office asking her to reschedule her heart echo that was supposed to be today per patients request. Dr. Galvez ordered platelet transfusion so patient won't make it to the echo today. Patient is coming back to our office to see us on 11/5/21 and I have asked to see if they can reschedule her for that day after our appointment since she will already be in town. Asked for Jina to contact Ratna with the new appointment information.     Rp    73